# Patient Record
Sex: FEMALE | Race: WHITE | NOT HISPANIC OR LATINO | Employment: FULL TIME | URBAN - NONMETROPOLITAN AREA
[De-identification: names, ages, dates, MRNs, and addresses within clinical notes are randomized per-mention and may not be internally consistent; named-entity substitution may affect disease eponyms.]

---

## 2017-01-05 ENCOUNTER — ROUTINE PRENATAL (OUTPATIENT)
Dept: OBSTETRICS AND GYNECOLOGY | Facility: CLINIC | Age: 27
End: 2017-01-05

## 2017-01-05 VITALS — SYSTOLIC BLOOD PRESSURE: 128 MMHG | WEIGHT: 160 LBS | BODY MASS INDEX: 30.23 KG/M2 | DIASTOLIC BLOOD PRESSURE: 72 MMHG

## 2017-01-05 DIAGNOSIS — Z34.83 PRENATAL CARE, SUBSEQUENT PREGNANCY, THIRD TRIMESTER: ICD-10-CM

## 2017-01-05 DIAGNOSIS — Z3A.35 35 WEEKS GESTATION OF PREGNANCY: ICD-10-CM

## 2017-01-05 DIAGNOSIS — Z36.9 ENCOUNTER FOR ANTENATAL SCREENING: Primary | ICD-10-CM

## 2017-01-05 PROCEDURE — 0502F SUBSEQUENT PRENATAL CARE: CPT | Performed by: OBSTETRICS & GYNECOLOGY

## 2017-01-05 NOTE — MR AVS SNAPSHOT
Sofiya Almaguer   2017 1:15 PM   Routine Prenatal    Dept Phone:  [phone]   Encounter #:  [id number]    Provider:  Pradip Holm MD   Department:  [facility]                Your Full Care Plan              Your Updated Medication List          This list is accurate as of: 17  1:24 PM.  Always use your most recent med list.                clindamycin-benzoyl peroxide 1-5 % gel   Commonly known as:  BENZACLIN       PRENATAL FA PO               We Performed the Following     Group B Strep (Molecular)       You Were Diagnosed With        Codes Comments    Encounter for  screening    -  Primary ICD-10-CM: Z36  ICD-9-CM: V28.9     Prenatal care, subsequent pregnancy, third trimester     ICD-10-CM: Z34.83  ICD-9-CM: V22.1     35 weeks gestation of pregnancy     ICD-10-CM: Z3A.35  ICD-9-CM: V22.2       Instructions     None    Patient Instructions History      Upcoming Appointments     Visit Type Date Time Department    OB FOLLOWUP 2017  1:15 PM MGW OBGYN [facility]    OB FOLLOWUP 2017  3:00 PM W OBGYN [facility]      [facility] Signup     Our records indicate that you have an active [employer] account.    You can view your After Visit Summary by going to TextDigger and logging in with your NetSt[employer] username and password.  If you don't have a NetStreams username and password but a parent or guardian has access to your record, the parent or guardian should login with their own NetStreams username and password and access your record to view the After Visit Summary.    If you have questions, you can email [email or web address] or call [phone] to talk to our NetSt[employer] staff.  Remember, NetStreams is NOT to be used for urgent needs.  For medical emergencies, dial 911.               Other Info from Your Visit           Your Appointments     2017  3:00 PM [facility]   OB FOLLOWUP with Pradip Holm MD   [facility]  GROUP OB GYN (--)    [address] [place] [address] Dr  [facility] [address] [facility] [address] [phone]              Other Notes About Your Plan     Needs repeat us at 32 weeks to assess kidneys        Allergies     No Known Allergies      Reason for Visit     Routine Prenatal Visit           Vital Signs     Blood Pressure Weight Last Menstrual Period Body Mass Index Smoking Status       128/72 160 lb (72.6 kg) 2016 (Exact Date) 30.23 kg/m2 Never Smoker       Problems and Diagnoses Noted     Encounter for  screening    -  Primary    Prenatal care        35 weeks gestation of pregnancy

## 2017-01-05 NOTE — PROGRESS NOTES
Chief Complaint   Patient presents with   • Routine Prenatal Visit       ROS  Headache: No   Visual changes: No   Swelling in legs: No   Nausea: No   Constipation: No   Diarrhea: No   Contractions: No   Leaking fluid: No   Vaginal bleeding: No   Other:      Specific topics discussed at today's visit: none - she had no major complaints,questions or concerns  Tests done today: GBS testing

## 2017-01-12 ENCOUNTER — ROUTINE PRENATAL (OUTPATIENT)
Dept: OBSTETRICS AND GYNECOLOGY | Facility: CLINIC | Age: 27
End: 2017-01-12

## 2017-01-12 VITALS — DIASTOLIC BLOOD PRESSURE: 71 MMHG | BODY MASS INDEX: 29.85 KG/M2 | WEIGHT: 158 LBS | SYSTOLIC BLOOD PRESSURE: 106 MMHG

## 2017-01-12 DIAGNOSIS — Z3A.36 36 WEEKS GESTATION OF PREGNANCY: Primary | ICD-10-CM

## 2017-01-12 DIAGNOSIS — Z34.83 ENCOUNTER FOR SUPERVISION OF NORMAL PREGNANCY IN MULTIGRAVIDA IN THIRD TRIMESTER: ICD-10-CM

## 2017-01-12 PROCEDURE — 0502F SUBSEQUENT PRENATAL CARE: CPT | Performed by: NURSE PRACTITIONER

## 2017-01-12 NOTE — MR AVS SNAPSHOT
Sofiya Concepcionley   1/12/2017 2:00 PM   Routine Prenatal    Dept Phone:  [phone]   Encounter #:  [id number]    Provider:  LEEANN Flores   Department:  [facility] OB GYN                Your Full Care Plan              Your Updated Medication List          This list is accurate as of: 1/12/17  2:24 PM.  Always use your most recent med list.                clindamycin-benzoyl peroxide 1-5 % gel   Commonly known as:  BENZACLIN       PRENATAL FA PO               Instructions     None    Patient Instructions History      Upcoming Appointments     Visit Type Date Time Department    OB FOLLOWUP 1/12/2017  2:00 PM [facility] OBGYN [facility]    OB FOLLOWUP 1/19/2017  2:00 PM [facility] OBGYN [facility]      MyChart Signup     Our records indicate that you have an active [employer] account.    You can view your After Visit Summary by going to BlueYield and logging in with your Enbridge username and password.  If you don't have a Enbridge username and password but a parent or guardian has access to your record, the parent or guardian should login with their own Enbridge username and password and access your record to view the After Visit Summary.    If you have questions, you can email [email or web address] or call [phone] to talk to our Enbridge staff.  Remember, [employer] is NOT to be used for urgent needs.  For medical emergencies, dial 911.               Other Info from Your Visit           Your Appointments     Jan 19, 2017  2:00 PM [facility]   OB FOLLOWUP with Pradip Holm MD   [facility] OB GYN (--)    [address] [place] [address] Dr  [facility] [address] [place] [address] [phone]              Other Notes About Your Plan     Needs repeat us at 32 weeks to assess kidneys        Allergies     No Known Allergies      Vital Signs     Blood Pressure Weight Last Menstrual Period Body Mass Index Smoking Status       106/71 158 lb (71.7 kg)  05/01/2016 (Exact Date) 29.85 kg/m2 Never Smoker

## 2017-01-12 NOTE — PROGRESS NOTES
Chief Complaint   Patient presents with   • Routine Prenatal Visit       ROS  Headache: No   Visual changes: No   Swelling in legs: No   Nausea: No   Constipation: No   Diarrhea: No   Contractions: BH   Leaking fluid: No   Vaginal bleeding: No   Other:      Specific topics discussed at today's visit: labor signs and symptoms  GBS results  Tests done today: none

## 2017-01-19 ENCOUNTER — ROUTINE PRENATAL (OUTPATIENT)
Dept: OBSTETRICS AND GYNECOLOGY | Facility: CLINIC | Age: 27
End: 2017-01-19

## 2017-01-19 VITALS — SYSTOLIC BLOOD PRESSURE: 108 MMHG | DIASTOLIC BLOOD PRESSURE: 72 MMHG | BODY MASS INDEX: 30.42 KG/M2 | WEIGHT: 161 LBS

## 2017-01-19 DIAGNOSIS — Z3A.37 37 WEEKS GESTATION OF PREGNANCY: ICD-10-CM

## 2017-01-19 DIAGNOSIS — Z34.83 ENCOUNTER FOR SUPERVISION OF NORMAL PREGNANCY IN MULTIGRAVIDA IN THIRD TRIMESTER: Primary | ICD-10-CM

## 2017-01-19 PROCEDURE — 0502F SUBSEQUENT PRENATAL CARE: CPT | Performed by: OBSTETRICS & GYNECOLOGY

## 2017-01-19 NOTE — MR AVS SNAPSHOT
Sofiya Almaguer   1/19/2017 2:00 PM   Routine Prenatal    Dept Phone:  [phone]   Encounter #:  [id number]    Provider:  Pradip Holm MD   Department:  [facility] OB GYN                Your Full Care Plan              Your Updated Medication List          This list is accurate as of: 1/19/17  2:02 PM.  Always use your most recent med list.                clindamycin-benzoyl peroxide 1-5 % gel   Commonly known as:  BENZACLIN       PRENATAL FA PO               Instructions     None    Patient Instructions History      Upcoming Appointments     Visit Type Date Time Department    OB FOLLOWUP 1/19/2017  2:00 PM [facility] OBGYN [facility]    OB FOLLOWUP 1/26/2017  1:30 PM [facility] OBGYN [facility]      MyChart Signup     Our records indicate that you have an active [employer] account.    You can view your After Visit Summary by going to Health Informatics and logging in with your [employer] username and password.  If you don't have a [employer] username and password but a parent or guardian has access to your record, the parent or guardian should login with their own [employer] username and password and access your record to view the After Visit Summary.    If you have questions, you can email [email or web address] or call [phone] to talk to our [employer] staff.  Remember, [employer] is NOT to be used for urgent needs.  For medical emergencies, dial 911.               Other Info from Your Visit           Your Appointments     Jan 26, 2017  1:30 PM [facility]   OB FOLLOWUP with Pradip Holm MD   [facility] OB GYN (--)    [address] [place] [address] [address]  [address] [place] [address] [phone]              Other Notes About Your Plan     Needs repeat us at 32 weeks to assess kidneys        Allergies     No Known Allergies      Vital Signs     Blood Pressure Weight Last Menstrual Period Body Mass Index Smoking Status       108/72 161 lb (73 kg)  05/01/2016 (Exact Date) 30.42 kg/m2 Never Smoker

## 2017-01-19 NOTE — PROGRESS NOTES
Chief Complaint   Patient presents with   • Routine Prenatal Visit       ROS  Headache: No   Visual changes: No   Swelling in legs: No   Nausea: No   Constipation: No   Diarrhea: No   Contractions: No   Leaking fluid: No   Vaginal bleeding: No   Other:      Specific topics discussed at today's visit: none - she had no major complaints,questions or concerns  Tests done today: none

## 2017-01-26 ENCOUNTER — ROUTINE PRENATAL (OUTPATIENT)
Dept: OBSTETRICS AND GYNECOLOGY | Facility: CLINIC | Age: 27
End: 2017-01-26

## 2017-01-26 VITALS — SYSTOLIC BLOOD PRESSURE: 112 MMHG | DIASTOLIC BLOOD PRESSURE: 64 MMHG | WEIGHT: 160 LBS | BODY MASS INDEX: 30.23 KG/M2

## 2017-01-26 DIAGNOSIS — Z34.83 ENCOUNTER FOR SUPERVISION OF NORMAL PREGNANCY IN MULTIGRAVIDA IN THIRD TRIMESTER: Primary | ICD-10-CM

## 2017-01-26 DIAGNOSIS — Z3A.38 38 WEEKS GESTATION OF PREGNANCY: ICD-10-CM

## 2017-01-26 PROCEDURE — 0502F SUBSEQUENT PRENATAL CARE: CPT | Performed by: OBSTETRICS & GYNECOLOGY

## 2017-01-29 ENCOUNTER — ANESTHESIA EVENT (OUTPATIENT)
Dept: LABOR AND DELIVERY | Facility: HOSPITAL | Age: 27
End: 2017-01-29

## 2017-01-29 ENCOUNTER — ANESTHESIA (OUTPATIENT)
Dept: LABOR AND DELIVERY | Facility: HOSPITAL | Age: 27
End: 2017-01-29

## 2017-01-29 ENCOUNTER — HOSPITAL ENCOUNTER (INPATIENT)
Facility: HOSPITAL | Age: 27
LOS: 2 days | Discharge: HOME | End: 2017-01-31
Attending: OBSTETRICS & GYNECOLOGY | Admitting: OBSTETRICS & GYNECOLOGY

## 2017-01-29 DIAGNOSIS — Z37.9 NORMAL LABOR: ICD-10-CM

## 2017-01-29 LAB
ABO GROUP BLD: NORMAL
AMPHET+METHAMPHET UR QL: NEGATIVE
BARBITURATES UR QL SCN: NEGATIVE
BENZODIAZ UR QL SCN: NEGATIVE
BLD GP AB SCN SERPL QL: NEGATIVE
CANNABINOIDS SERPL QL: NEGATIVE
COCAINE UR QL: NEGATIVE
DEPRECATED RDW RBC AUTO: 36.7 FL (ref 36.4–46.3)
EOSINOPHIL # BLD MANUAL: 0.13 10*3/MM3 (ref 0–0.7)
EOSINOPHIL NFR BLD MANUAL: 1 % (ref 0–7)
ERYTHROCYTE [DISTWIDTH] IN BLOOD BY AUTOMATED COUNT: 12.6 % (ref 11.5–14.5)
HCT VFR BLD AUTO: 33.1 % (ref 35–45)
HGB BLD-MCNC: 11.4 G/DL (ref 12–15.5)
HYPOCHROMIA BLD QL: ABNORMAL
LYMPHOCYTES # BLD MANUAL: 1.75 10*3/MM3 (ref 0.6–4.2)
LYMPHOCYTES NFR BLD MANUAL: 1 % (ref 0–12)
LYMPHOCYTES NFR BLD MANUAL: 13 % (ref 10–50)
Lab: NORMAL
MCH RBC QN AUTO: 27.7 PG (ref 26.5–34)
MCHC RBC AUTO-ENTMCNC: 34.4 G/DL (ref 31.4–36)
MCV RBC AUTO: 80.3 FL (ref 80–98)
METHADONE UR QL SCN: NEGATIVE
MONOCYTES # BLD AUTO: 0.13 10*3/MM3 (ref 0–0.9)
NEUTROPHILS # BLD AUTO: 11.42 10*3/MM3 (ref 2–8.6)
NEUTROPHILS NFR BLD MANUAL: 85 % (ref 37–80)
OPIATES UR QL: NEGATIVE
OXYCODONE UR QL SCN: NEGATIVE
PLAT MORPH BLD: NORMAL
PLATELET # BLD AUTO: 204 10*3/MM3 (ref 150–450)
PMV BLD AUTO: 10.8 FL (ref 8–12)
RBC # BLD AUTO: 4.12 10*6/MM3 (ref 3.77–5.16)
RH BLD: POSITIVE
WBC MORPH BLD: NORMAL
WBC NRBC COR # BLD: 13.44 10*3/MM3 (ref 3.2–9.8)

## 2017-01-29 PROCEDURE — 86592 SYPHILIS TEST NON-TREP QUAL: CPT | Performed by: OBSTETRICS & GYNECOLOGY

## 2017-01-29 PROCEDURE — 85027 COMPLETE CBC AUTOMATED: CPT | Performed by: OBSTETRICS & GYNECOLOGY

## 2017-01-29 PROCEDURE — 80307 DRUG TEST PRSMV CHEM ANLYZR: CPT | Performed by: OBSTETRICS & GYNECOLOGY

## 2017-01-29 PROCEDURE — 10907ZC DRAINAGE OF AMNIOTIC FLUID, THERAPEUTIC FROM PRODUCTS OF CONCEPTION, VIA NATURAL OR ARTIFICIAL OPENING: ICD-10-PCS | Performed by: OBSTETRICS & GYNECOLOGY

## 2017-01-29 PROCEDURE — 86850 RBC ANTIBODY SCREEN: CPT

## 2017-01-29 PROCEDURE — 85007 BL SMEAR W/DIFF WBC COUNT: CPT | Performed by: OBSTETRICS & GYNECOLOGY

## 2017-01-29 PROCEDURE — 86901 BLOOD TYPING SEROLOGIC RH(D): CPT

## 2017-01-29 PROCEDURE — 86900 BLOOD TYPING SEROLOGIC ABO: CPT

## 2017-01-29 RX ORDER — BUPIVACAINE HYDROCHLORIDE 2.5 MG/ML
INJECTION, SOLUTION EPIDURAL; INFILTRATION; INTRACAUDAL AS NEEDED
Status: DISCONTINUED | OUTPATIENT
Start: 2017-01-29 | End: 2017-01-30 | Stop reason: SURG

## 2017-01-29 RX ORDER — SODIUM CHLORIDE 0.9 % (FLUSH) 0.9 %
1-10 SYRINGE (ML) INJECTION AS NEEDED
Status: DISCONTINUED | OUTPATIENT
Start: 2017-01-29 | End: 2017-01-30

## 2017-01-29 RX ORDER — SODIUM CHLORIDE, SODIUM LACTATE, POTASSIUM CHLORIDE, CALCIUM CHLORIDE 600; 310; 30; 20 MG/100ML; MG/100ML; MG/100ML; MG/100ML
INJECTION, SOLUTION INTRAVENOUS
Status: DISCONTINUED
Start: 2017-01-29 | End: 2017-01-30 | Stop reason: WASHOUT

## 2017-01-29 RX ORDER — OXYTOCIN/RINGER'S LACTATE 20/1000 ML
PLASTIC BAG, INJECTION (ML) INTRAVENOUS
Status: DISPENSED
Start: 2017-01-29 | End: 2017-01-30

## 2017-01-29 RX ORDER — SODIUM CHLORIDE, SODIUM LACTATE, POTASSIUM CHLORIDE, CALCIUM CHLORIDE 600; 310; 30; 20 MG/100ML; MG/100ML; MG/100ML; MG/100ML
INJECTION, SOLUTION INTRAVENOUS
Status: DISPENSED
Start: 2017-01-29 | End: 2017-01-30

## 2017-01-29 RX ORDER — PRENATAL VIT/IRON FUM/FOLIC AC 27MG-0.8MG
1 TABLET ORAL DAILY
Status: DISCONTINUED | OUTPATIENT
Start: 2017-01-30 | End: 2017-01-31 | Stop reason: HOSPADM

## 2017-01-29 RX ORDER — SODIUM CHLORIDE, SODIUM LACTATE, POTASSIUM CHLORIDE, CALCIUM CHLORIDE 600; 310; 30; 20 MG/100ML; MG/100ML; MG/100ML; MG/100ML
125 INJECTION, SOLUTION INTRAVENOUS CONTINUOUS
Status: DISCONTINUED | OUTPATIENT
Start: 2017-01-29 | End: 2017-01-30

## 2017-01-29 RX ADMIN — SODIUM CHLORIDE, POTASSIUM CHLORIDE, SODIUM LACTATE AND CALCIUM CHLORIDE 125 ML/HR: 600; 310; 30; 20 INJECTION, SOLUTION INTRAVENOUS at 22:30

## 2017-01-29 RX ADMIN — BUPIVACAINE HYDROCHLORIDE 2 ML: 2.5 INJECTION, SOLUTION EPIDURAL; INFILTRATION; INTRACAUDAL; PERINEURAL at 22:36

## 2017-01-29 RX ADMIN — BUPIVACAINE HYDROCHLORIDE 3 ML: 2.5 INJECTION, SOLUTION EPIDURAL; INFILTRATION; INTRACAUDAL; PERINEURAL at 22:50

## 2017-01-30 PROCEDURE — 51703 INSERT BLADDER CATH COMPLEX: CPT

## 2017-01-30 PROCEDURE — C1755 CATHETER, INTRASPINAL: HCPCS | Performed by: NURSE PRACTITIONER

## 2017-01-30 PROCEDURE — 0KQM0ZZ REPAIR PERINEUM MUSCLE, OPEN APPROACH: ICD-10-PCS | Performed by: OBSTETRICS & GYNECOLOGY

## 2017-01-30 PROCEDURE — 59400 OBSTETRICAL CARE: CPT | Performed by: OBSTETRICS & GYNECOLOGY

## 2017-01-30 RX ORDER — MISOPROSTOL 200 UG/1
800 TABLET ORAL AS NEEDED
Status: DISCONTINUED | OUTPATIENT
Start: 2017-01-30 | End: 2017-01-30 | Stop reason: HOSPADM

## 2017-01-30 RX ORDER — LANOLIN 100 %
OINTMENT (GRAM) TOPICAL
Status: COMPLETED
Start: 2017-01-30 | End: 2017-01-30

## 2017-01-30 RX ORDER — SODIUM CHLORIDE 0.9 % (FLUSH) 0.9 %
1-10 SYRINGE (ML) INJECTION AS NEEDED
Status: DISCONTINUED | OUTPATIENT
Start: 2017-01-30 | End: 2017-01-31 | Stop reason: HOSPADM

## 2017-01-30 RX ORDER — ZOLPIDEM TARTRATE 5 MG/1
5 TABLET ORAL NIGHTLY PRN
Status: DISCONTINUED | OUTPATIENT
Start: 2017-01-30 | End: 2017-01-31 | Stop reason: HOSPADM

## 2017-01-30 RX ORDER — IBUPROFEN 600 MG/1
600 TABLET ORAL EVERY 8 HOURS PRN
Status: DISCONTINUED | OUTPATIENT
Start: 2017-01-30 | End: 2017-01-31 | Stop reason: HOSPADM

## 2017-01-30 RX ORDER — LIDOCAINE HYDROCHLORIDE 10 MG/ML
INJECTION, SOLUTION INFILTRATION; PERINEURAL
Status: DISPENSED
Start: 2017-01-30 | End: 2017-01-30

## 2017-01-30 RX ORDER — DOCUSATE SODIUM 100 MG/1
100 CAPSULE, LIQUID FILLED ORAL 2 TIMES DAILY
Status: DISCONTINUED | OUTPATIENT
Start: 2017-01-30 | End: 2017-01-31 | Stop reason: HOSPADM

## 2017-01-30 RX ORDER — CARBOPROST TROMETHAMINE 250 UG/ML
250 INJECTION, SOLUTION INTRAMUSCULAR AS NEEDED
Status: DISCONTINUED | OUTPATIENT
Start: 2017-01-30 | End: 2017-01-30 | Stop reason: HOSPADM

## 2017-01-30 RX ORDER — METHYLERGONOVINE MALEATE 0.2 MG/ML
200 INJECTION INTRAVENOUS ONCE AS NEEDED
Status: DISCONTINUED | OUTPATIENT
Start: 2017-01-30 | End: 2017-01-30 | Stop reason: HOSPADM

## 2017-01-30 RX ORDER — BISACODYL 10 MG
10 SUPPOSITORY, RECTAL RECTAL DAILY PRN
Status: DISCONTINUED | OUTPATIENT
Start: 2017-01-31 | End: 2017-01-31 | Stop reason: HOSPADM

## 2017-01-30 RX ORDER — IBUPROFEN 600 MG/1
TABLET ORAL
Status: COMPLETED
Start: 2017-01-30 | End: 2017-01-30

## 2017-01-30 RX ORDER — OXYCODONE HYDROCHLORIDE AND ACETAMINOPHEN 5; 325 MG/1; MG/1
1 TABLET ORAL EVERY 4 HOURS PRN
Status: DISCONTINUED | OUTPATIENT
Start: 2017-01-30 | End: 2017-01-31 | Stop reason: HOSPADM

## 2017-01-30 RX ORDER — OXYTOCIN/RINGER'S LACTATE 20/1000 ML
2 PLASTIC BAG, INJECTION (ML) INTRAVENOUS
Status: DISCONTINUED | OUTPATIENT
Start: 2017-01-30 | End: 2017-01-30 | Stop reason: HOSPADM

## 2017-01-30 RX ADMIN — Medication 2 MILLI-UNITS/MIN: at 01:30

## 2017-01-30 RX ADMIN — OXYCODONE HYDROCHLORIDE AND ACETAMINOPHEN 1 TABLET: 5; 325 TABLET ORAL at 09:05

## 2017-01-30 RX ADMIN — DOCUSATE SODIUM 100 MG: 100 CAPSULE, LIQUID FILLED ORAL at 10:15

## 2017-01-30 RX ADMIN — BENZOCAINE AND MENTHOL: 20; .5 SPRAY TOPICAL at 08:41

## 2017-01-30 RX ADMIN — IBUPROFEN 600 MG: 600 TABLET ORAL at 03:48

## 2017-01-30 RX ADMIN — IBUPROFEN 600 MG: 600 TABLET ORAL at 11:25

## 2017-01-30 RX ADMIN — OXYCODONE HYDROCHLORIDE AND ACETAMINOPHEN 1 TABLET: 5; 325 TABLET ORAL at 13:44

## 2017-01-30 RX ADMIN — OXYCODONE HYDROCHLORIDE AND ACETAMINOPHEN 1 TABLET: 5; 325 TABLET ORAL at 19:01

## 2017-01-30 RX ADMIN — PRENATAL VIT W/ FE FUMARATE-FA TAB 27-0.8 MG 1 TABLET: 27-0.8 TAB at 10:15

## 2017-01-30 RX ADMIN — DOCUSATE SODIUM 100 MG: 100 CAPSULE, LIQUID FILLED ORAL at 19:01

## 2017-01-30 RX ADMIN — Medication: at 13:30

## 2017-01-30 NOTE — ANESTHESIA PREPROCEDURE EVALUATION
Anesthesia Evaluation     Patient summary reviewed and Nursing notes reviewed    No history of anesthetic complications   Airway   Mallampati: II  Neck ROM: full  no difficulty expected  Dental      Pulmonary - negative pulmonary ROS and normal exam   (-) shortness of breath  Cardiovascular - negative cardio ROS and normal exam  Exercise tolerance: good (4-7 METS)  (-) CAD, angina, orthopnea, RIOS    Neuro/Psych- negative ROS  (-) CVA  GI/Hepatic/Renal/Endo - negative ROS     Musculoskeletal (-) negative ROS    Abdominal  - normal exam   Substance History - negative use     OB/GYN negative ob/gyn ROS         Other - negative ROS                            Anesthesia Plan    ASA 2     epidural     Anesthetic plan and risks discussed with patient.

## 2017-01-30 NOTE — ANESTHESIA PROCEDURE NOTES
Labor Epidural    Patient location during procedure: OB  Performed By  CRNA: OLIMPIA FERREIRA  Preanesthetic Checklist  Completed: patient identified, pre-op evaluation, timeout performed, IV checked, risks and benefits discussed and monitors and equipment checked  Epidural Block Prep:  Pt Position:sitting  Sterile Tech:cap, gloves, mask and sterile barrier  Monitoring:blood pressure monitoring, continuous pulse oximetry and EKG  Epidural Block Procedure:  Approach:midline  Guidance:landmark technique  Needle Type:Tuohy  Needle Gauge:17 G  Loss of Resistance: 5cm  Cath Depth at skin:10 cm  Paresthesia: none  Aspiration:negative  Test Dose:negative  Post Assessment:  Dressing:occlusive dressing applied  Pt Tolerance:patient tolerated the procedure well with no apparent complications  Complications:no

## 2017-01-30 NOTE — ANESTHESIA POSTPROCEDURE EVALUATION
Patient: Sofiya Almaguer    Procedure Summary     Date Anesthesia Start Anesthesia Stop Room / Location    01/29/17 2218 0154 (01/30/17)        Procedure Diagnosis Scheduled Providers Provider    LABOR ANALGESIA No diagnosis on file.  Louie Davis CRNA          Anesthesia Type: epidural  Last vitals  BP      Temp      Pulse     Resp      SpO2        Post Anesthesia Care and Evaluation    Patient location during evaluation: bedside  Patient participation: complete - patient participated  Level of consciousness: awake and alert  Pain management: adequate  Airway patency: patent  Anesthetic complications: No anesthetic complications    Cardiovascular status: acceptable  Respiratory status: acceptable  Hydration status: acceptable

## 2017-01-31 VITALS
BODY MASS INDEX: 29.44 KG/M2 | OXYGEN SATURATION: 100 % | TEMPERATURE: 97.9 F | SYSTOLIC BLOOD PRESSURE: 119 MMHG | RESPIRATION RATE: 18 BRPM | HEIGHT: 62 IN | WEIGHT: 160 LBS | DIASTOLIC BLOOD PRESSURE: 78 MMHG | HEART RATE: 88 BPM

## 2017-01-31 LAB
BASOPHILS # BLD AUTO: 0.03 10*3/MM3 (ref 0–0.2)
BASOPHILS NFR BLD AUTO: 0.4 % (ref 0–2)
DEPRECATED RDW RBC AUTO: 37.7 FL (ref 36.4–46.3)
EOSINOPHIL # BLD AUTO: 0.14 10*3/MM3 (ref 0–0.7)
EOSINOPHIL NFR BLD AUTO: 1.7 % (ref 0–7)
ERYTHROCYTE [DISTWIDTH] IN BLOOD BY AUTOMATED COUNT: 12.8 % (ref 11.5–14.5)
HCT VFR BLD AUTO: 30.6 % (ref 35–45)
HGB BLD-MCNC: 10.3 G/DL (ref 12–15.5)
IMM GRANULOCYTES # BLD: 0.05 10*3/MM3 (ref 0–0.02)
IMM GRANULOCYTES NFR BLD: 0.6 % (ref 0–0.5)
LYMPHOCYTES # BLD AUTO: 2.14 10*3/MM3 (ref 0.6–4.2)
LYMPHOCYTES NFR BLD AUTO: 26.5 % (ref 10–50)
MCH RBC QN AUTO: 27.5 PG (ref 26.5–34)
MCHC RBC AUTO-ENTMCNC: 33.7 G/DL (ref 31.4–36)
MCV RBC AUTO: 81.6 FL (ref 80–98)
MONOCYTES # BLD AUTO: 0.54 10*3/MM3 (ref 0–0.9)
MONOCYTES NFR BLD AUTO: 6.7 % (ref 0–12)
NEUTROPHILS # BLD AUTO: 5.18 10*3/MM3 (ref 2–8.6)
NEUTROPHILS NFR BLD AUTO: 64.1 % (ref 37–80)
PLATELET # BLD AUTO: 211 10*3/MM3 (ref 150–450)
PMV BLD AUTO: 10.8 FL (ref 8–12)
RBC # BLD AUTO: 3.75 10*6/MM3 (ref 3.77–5.16)
RPR SER QL: NORMAL
WBC NRBC COR # BLD: 8.08 10*3/MM3 (ref 3.2–9.8)

## 2017-01-31 PROCEDURE — 85025 COMPLETE CBC W/AUTO DIFF WBC: CPT | Performed by: OBSTETRICS & GYNECOLOGY

## 2017-01-31 RX ORDER — OXYCODONE HYDROCHLORIDE AND ACETAMINOPHEN 5; 325 MG/1; MG/1
1 TABLET ORAL EVERY 4 HOURS PRN
Qty: 30 TABLET | Refills: 0 | Status: SHIPPED | OUTPATIENT
Start: 2017-01-31 | End: 2017-02-08

## 2017-01-31 RX ORDER — IBUPROFEN 600 MG/1
600 TABLET ORAL EVERY 8 HOURS PRN
Qty: 30 TABLET | Refills: 1 | Status: SHIPPED | OUTPATIENT
Start: 2017-01-31 | End: 2017-02-20 | Stop reason: SDUPTHER

## 2017-01-31 RX ADMIN — OXYCODONE HYDROCHLORIDE AND ACETAMINOPHEN 1 TABLET: 5; 325 TABLET ORAL at 08:51

## 2017-01-31 RX ADMIN — OXYCODONE HYDROCHLORIDE AND ACETAMINOPHEN 1 TABLET: 5; 325 TABLET ORAL at 01:22

## 2017-01-31 RX ADMIN — DOCUSATE SODIUM 100 MG: 100 CAPSULE, LIQUID FILLED ORAL at 08:51

## 2017-01-31 RX ADMIN — BENZOCAINE AND MENTHOL: 20; .5 SPRAY TOPICAL at 10:02

## 2017-01-31 RX ADMIN — HYDROCORTISONE 2.5% 1 APPLICATION: 25 CREAM TOPICAL at 10:12

## 2017-01-31 RX ADMIN — IBUPROFEN 600 MG: 600 TABLET ORAL at 08:51

## 2017-01-31 RX ADMIN — PRENATAL VIT W/ FE FUMARATE-FA TAB 27-0.8 MG 1 TABLET: 27-0.8 TAB at 08:51

## 2017-01-31 NOTE — ADDENDUM NOTE
Addendum  created 01/31/17 1234 by Louie Davis, CRNA    Anesthesia Intra LDAs edited, LDA properties accepted, Order sets accessed

## 2017-02-08 ENCOUNTER — OFFICE VISIT (OUTPATIENT)
Dept: OBSTETRICS AND GYNECOLOGY | Facility: CLINIC | Age: 27
End: 2017-02-08

## 2017-02-08 VITALS — WEIGHT: 144 LBS | BODY MASS INDEX: 26.77 KG/M2

## 2017-02-08 PROBLEM — Z34.83 ENCOUNTER FOR SUPERVISION OF NORMAL PREGNANCY IN MULTIGRAVIDA IN THIRD TRIMESTER: Status: RESOLVED | Noted: 2017-01-12 | Resolved: 2017-02-08

## 2017-02-08 PROBLEM — Z37.9 NORMAL LABOR: Status: RESOLVED | Noted: 2017-01-29 | Resolved: 2017-02-08

## 2017-02-08 PROCEDURE — 0503F POSTPARTUM CARE VISIT: CPT | Performed by: OBSTETRICS & GYNECOLOGY

## 2017-02-08 NOTE — PROGRESS NOTES
Subjective   No chief complaint on file.    Sofiya Almaguer is a 26 y.o. year old  presenting for a postpartum visit.  She had a vaginal delivery With forceps.   Prenatal course was been benign.  The patient complains of pain at her epidural site and if swelling in both legs.  She also notes headaches which are not alleviated by Motrin    Since delivery she has not been sexually active.  She does not have concerns about post-partum blues/depression.   She is breast feeding    The following portions of the patient's history were reviewed and updated as appropriate:current medications, allergies and past surgical history    Smoking status: Never Smoker                                                              Smokeless status: Never Used                        Review of Systems      Objective   Visit Vitals   • Wt 144 lb (65.3 kg)   • LMP 2016 (Exact Date)   • Breastfeeding Yes   • BMI 26.77 kg/m2       General:  well developed; well nourished  no acute distress   Abdomen: soft, non-tender; no masses  no umbilical or inginual hernias are present  no hepato-splenomegaly  The back is nontender at the area of the epidural site   Pelvis: Not performed.   Her blood pressure was 108/66       Assessment   1. Normal 1 week postpartum exam     Plan   1. She was reassured that the swelling should go away within the next 2-3 weeks.  There is probably some bruising beneath the skin at the epidural site and this should probably get better as well.  If her headaches don't get better she should consider seeing her primary care doctor or neurology         This note was electronically signed.    Pradip Holm MD  2017

## 2017-02-20 ENCOUNTER — OFFICE VISIT (OUTPATIENT)
Dept: FAMILY MEDICINE CLINIC | Facility: CLINIC | Age: 27
End: 2017-02-20

## 2017-02-20 VITALS
BODY MASS INDEX: 26.41 KG/M2 | SYSTOLIC BLOOD PRESSURE: 96 MMHG | OXYGEN SATURATION: 99 % | HEART RATE: 86 BPM | HEIGHT: 61 IN | DIASTOLIC BLOOD PRESSURE: 55 MMHG | WEIGHT: 139.9 LBS | TEMPERATURE: 97.3 F

## 2017-02-20 DIAGNOSIS — M54.50 ACUTE BILATERAL LOW BACK PAIN WITHOUT SCIATICA: ICD-10-CM

## 2017-02-20 DIAGNOSIS — H72.91 RUPTURED TYMPANIC MEMBRANE, RIGHT: Primary | ICD-10-CM

## 2017-02-20 PROCEDURE — 99213 OFFICE O/P EST LOW 20 MIN: CPT | Performed by: NURSE PRACTITIONER

## 2017-02-20 RX ORDER — FLUTICASONE PROPIONATE 50 MCG
2 SPRAY, SUSPENSION (ML) NASAL DAILY
Qty: 16 G | Refills: 5 | Status: SHIPPED | OUTPATIENT
Start: 2017-02-20 | End: 2017-03-15

## 2017-02-20 RX ORDER — CLINDAMYCIN PHOSPHATE 10 MG/G
GEL TOPICAL 2 TIMES WEEKLY
COMMUNITY
End: 2017-03-15

## 2017-02-20 RX ORDER — AMOXICILLIN AND CLAVULANATE POTASSIUM 875; 125 MG/1; MG/1
1 TABLET, FILM COATED ORAL 2 TIMES DAILY
Qty: 20 TABLET | Refills: 0 | Status: SHIPPED | OUTPATIENT
Start: 2017-02-20 | End: 2017-03-02

## 2017-02-20 RX ORDER — IBUPROFEN 800 MG/1
800 TABLET ORAL EVERY 6 HOURS PRN
COMMUNITY
End: 2017-03-15

## 2017-02-21 NOTE — PROGRESS NOTES
Subjective   Sofiya Almaguer is a 26 y.o. female. She presents today for difficulty hearting out of the right ear.  Reports symptoms for quite some time. She also has complaints of chronic lower back pain.  Recently had a baby. Would like a referral for PT to help treat this.    Ear Fullness    There is pain in the right ear. This is a chronic problem. The current episode started 1 to 4 weeks ago. The problem occurs constantly. The problem has been unchanged. There has been no fever. The patient is experiencing no pain. Associated symptoms include headaches, hearing loss and rhinorrhea. Pertinent negatives include no abdominal pain, coughing, diarrhea, ear discharge, neck pain, rash, sore throat or vomiting.   Back Pain   This is a chronic problem. The current episode started 1 to 4 weeks ago. The problem occurs constantly. The problem has been waxing and waning since onset. The pain is present in the lumbar spine. The quality of the pain is described as aching. The pain is the same all the time. Associated symptoms include headaches. Pertinent negatives include no abdominal pain, bladder incontinence, chest pain, dysuria, fever, leg pain, numbness, paresis, paresthesias, pelvic pain, perianal numbness, tingling, weakness or weight loss. Risk factors include pregnancy. She has tried NSAIDs for the symptoms. The treatment provided mild relief.        The following portions of the patient's history were reviewed and updated as appropriate: allergies, current medications, past family history, past medical history, past social history, past surgical history and problem list.    Review of Systems   Constitutional: Negative.  Negative for fever and weight loss.   HENT: Positive for hearing loss, postnasal drip and rhinorrhea. Negative for ear discharge, ear pain, facial swelling, mouth sores, nosebleeds, sinus pressure, sneezing and sore throat.    Respiratory: Negative.  Negative for cough and shortness of breath.     Cardiovascular: Negative.  Negative for chest pain and palpitations.   Gastrointestinal: Negative for abdominal pain, diarrhea and vomiting.   Genitourinary: Negative for bladder incontinence, dysuria and pelvic pain.   Musculoskeletal: Positive for arthralgias and back pain. Negative for neck pain.   Skin: Negative.  Negative for rash.   Neurological: Positive for headaches. Negative for tingling, weakness, numbness and paresthesias.       Objective   Physical Exam   Constitutional: She is oriented to person, place, and time. Vital signs are normal. She appears well-developed and well-nourished. No distress.   HENT:   Head: Normocephalic.   Right Ear: External ear normal. Tympanic membrane is injected and perforated.   Left Ear: External ear normal.   Nose: Nose normal.   Mouth/Throat: Oropharynx is clear and moist.   Eyes: EOM are normal. Pupils are equal, round, and reactive to light.   Neck: Normal range of motion. Neck supple. No JVD present. No thyromegaly present.   Cardiovascular: Normal rate and regular rhythm.  Exam reveals no gallop and no friction rub.    No murmur heard.  Pulmonary/Chest: Effort normal and breath sounds normal. No respiratory distress. She has no wheezes. She has no rales.   Musculoskeletal: Normal range of motion.        Lumbar back: She exhibits pain.   Neurological: She is alert and oriented to person, place, and time.   Skin: Skin is warm and dry. No rash noted. She is not diaphoretic. No erythema. No pallor.   Psychiatric: She has a normal mood and affect. Her behavior is normal. Judgment and thought content normal.   Nursing note and vitals reviewed.      Assessment/Plan   Sofiya was seen today for ear fullness, headache, neck pain and back pain.    Diagnoses and all orders for this visit:    Ruptured tympanic membrane, right  -     amoxicillin-clavulanate (AUGMENTIN) 875-125 MG per tablet; Take 1 tablet by mouth 2 (Two) Times a Day for 10 days.  -     fluticasone (FLONASE) 50  MCG/ACT nasal spray; 2 sprays into each nostril Daily.    Acute bilateral low back pain without sciatica  -     Ambulatory Referral to ENT (Otolaryngology)  -     Ambulatory Referral to Physical Therapy Evaluate and treat    Finish all antibiotics.  Referral to ENT for eval and further tx if needed.  Referral to PT for eval and tx of lower back pain.  Continue all other current medications.  Follow up as scheduled for routine follow up.  Follow up sooner for problems/concerns.  Patient verbalized understanding and agreement with plan of care.

## 2017-03-08 ENCOUNTER — APPOINTMENT (OUTPATIENT)
Dept: PHYSICAL THERAPY | Facility: HOSPITAL | Age: 27
End: 2017-03-08

## 2017-03-15 ENCOUNTER — PROCEDURE VISIT (OUTPATIENT)
Dept: OBSTETRICS AND GYNECOLOGY | Facility: CLINIC | Age: 27
End: 2017-03-15

## 2017-03-15 DIAGNOSIS — Z30.017 NEXPLANON INSERTION: ICD-10-CM

## 2017-03-15 PROCEDURE — 0503F POSTPARTUM CARE VISIT: CPT | Performed by: OBSTETRICS & GYNECOLOGY

## 2017-03-15 PROCEDURE — 11981 INSERTION DRUG DLVR IMPLANT: CPT | Performed by: OBSTETRICS & GYNECOLOGY

## 2017-03-15 NOTE — PROGRESS NOTES
Subjective   Chief Complaint   Patient presents with   • Postpartum Care     6 wk pp/poss nexplanon     Sofiya Almaguer is a 27 y.o. year old  presenting for a postpartum visit.  She had a vaginal delivery.   Prenatal course was been benign.    Since delivery she has not been sexually active.  She does not have concerns about post-partum blues/depression.   She is breast feeding    The following portions of the patient's history were reviewed and updated as appropriate:current medications, allergies and past surgical history    Smoking status: Never Smoker                                                              Smokeless status: Never Used                        Review of Systems      Objective   Visit Vitals   • LMP 2017 (Approximate)   • Breastfeeding Yes       General:  well developed; well nourished  no acute distress   Abdomen: soft, non-tender; no masses  no umbilical or inginual hernias are present  no hepato-splenomegaly   Pelvis: Clinical staff was present for exam  Uterus:  normal size, shape and consistency.     Nexplanon Insertion    Risks and benefits discussed? yes  All questions answered? yes  Consents given by the patient  Written consent obtained? yes    Local anesthesia used:  yes - 3 cc's of  Meds; anesthesia local: 1% lidocaine with epinephrine  NDC number: 1908-0901-89    Procedure documentation:    The upper left arm (non-dominant) was marked at the intended site of insertion. The skin was cleansed with an antiseptic solution.  Local anesthesia was injected.  The Nexplanon was placed subdermally without difficulty.  The devise was able to be palpated in the arm.  Steri-strips were then placed across the site of insertion and the arm was wrapped.    She tolerated the procedure well.  There were no complications.  EBL was minimal.    Post procedure instructions: Remove the wrapping in 6-24 hours and the steri-strips in 2-5 days.    Follow up needed: PRN    This note was  electronically signed.         Assessment   1. Normal 6 week postpartum exam  2. Nexplanon insertion     Plan   1. The patient is due for her next Pap smear in October.  She is to follow up then.  Follow up sooner if she has any problems.         This note was electronically signed.    Pradip Holm MD  March 15, 2017

## 2017-03-22 ENCOUNTER — HOSPITAL ENCOUNTER (OUTPATIENT)
Dept: PHYSICAL THERAPY | Facility: HOSPITAL | Age: 27
Setting detail: THERAPIES SERIES
Discharge: HOME | End: 2017-03-22

## 2017-03-22 DIAGNOSIS — M54.50 ACUTE BILATERAL LOW BACK PAIN WITHOUT SCIATICA: Primary | ICD-10-CM

## 2017-03-22 PROCEDURE — 97162 PT EVAL MOD COMPLEX 30 MIN: CPT | Performed by: PHYSICAL THERAPIST

## 2017-03-22 NOTE — PROGRESS NOTES
Outpatient Physical Therapy Ortho Initial Evaluation  [facility]  Palmira Brooke, PT, DPT, CSCS       Patient Name: Sofiya Almaguer  : 1990  MRN: [id number]  Today's Date: 3/22/2017      Visit Date: 2017     Pt reports 0/10 pain.  Reports N/A% of improvement.  Attended  visits.  Insurance available: Pending approval from secondary insurance, primary insurance based on medical necessity.  Next MD appt: Unknown  Recertification: 2017.    Patient Active Problem List   Diagnosis   (none) - all problems resolved or deleted        Past Medical History:   Diagnosis Date   • Abnormal liver function    • Abnormal weight gain    • Acne    • Cervical intraepithelial neoplasia grade 2     h/o   • Contraception    • Hyperlipidemia    • Vitamin D deficiency         Past Surgical History:   Procedure Laterality Date   • ADENOIDECTOMY     • CERVICAL BIOPSY  W/ LOOP ELECTRODE EXCISION     • INCISION AND DRAINAGE ABSCESS  2012    simple I&D   • LEEP  2007    LEEP cone biopsy. moderate cervical dysplasia   • OTHER SURGICAL HISTORY  2011    remove impacted cerumen   • TONSILLECTOMY         Visit Dx:     ICD-10-CM ICD-9-CM   1. Acute bilateral low back pain without sciatica M54.5 724.2     338.19     Number of days off work: None since returning from maternity leave    Patient is Single    Patient has 5 children, ages 11, 10, 8, 5, and 8 weeks.    Medications:  Prenatal Vitamin  Motrin 800mg  Fenugreel (vitamin)    Allergies: See EMR          Patient History       17 0900          History    Chief Complaint Pain  -AJ      Type of Pain Back pain  -AJ      Date Current Problem(s) Began 17  -AJ      Brief Description of Current Complaint Patient reports onset of LBP after her epidural wore off. Slowly improving but hurts more with standing or sitting too long. pain mainly right in the center. Feels like a sharp pain like sticking the needle in and then  "sharp pain. 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 No  -AJ      What clinical tests have you had for this problem? --   None  -AJ      History of Previous Related Injuries None  -AJ      Are you or can you be pregnant No  -AJ      Pain     Pain Location Back  -AJ      Pain at Present 0  -AJ      Pain at Best 0  -AJ      Pain at Worst 5  -AJ      Pain Frequency Intermittent  -AJ      Pain Description Throbbing;Sharp  -AJ      What Performance Factors Make the Current Problem(s) BETTER? Flexion  -AJ      Tolerance Time- Standing 1.5-2 hours  -AJ      Tolerance Time- Sitting 1 hour  -AJ      Tolerance Time- Walking Unlimited  -AJ      Tolerance Time- Lying Unlimited  -AJ      Is your sleep disturbed? No  -AJ      Is medication used to assist with sleep? No  -AJ      Difficulties at work? Standing long periods of time.  -AJ      Difficulties with ADL's? None.  -AJ        User Key  (r) = Recorded By, (t) = Taken By, (c) = Cosigned By    Initials Name Provider Type    AJ Palmira Brooke, PT Physical Therapist                PT Ortho       03/22/17 0900    Subjective Comments    Subjective Comments Patient wishes to get rid of the pain and \"be like it was\".  -AJ    Subjective Pain    Able to rate subjective pain? yes  -AJ    Pre-Treatment Pain Level 0  -AJ    Post-Treatment Pain Level 0  -AJ    Posture/Observations    Posture- WNL Posture is WNL  -AJ    Posture/Observations Comments Postural and spinal curves are all WNL, but the patient has extreamly poor sitting and standing postureal " awareness with slumped posture for both.  -AJ    Sensation    Sensation WNL? WNL  -AJ    Light Touch No apparent deficits  -AJ    Sharp/Dull No apparent deficits  -AJ    Additional Comments Patient denies and numbness and tingling  -AJ    DTR- Lower Quarter Clearing    Patellar tendon (L2-4) Bilateral:;2- Normal response  -AJ    Achilles tendon (S1-2) Bilateral:;2- Normal response  -AJ    Special Tests/Palpation    Special Tests/Palpation --   Mod TTP L3-L5, midline vertebral  -AJ    Lumbar/SI Special Tests    Standing Flexion Test (SI Dysfunction) Negative  -AJ    Stork Test (SI Dysfunction) Bilateral:;Negative  -AJ    Trendelenburg Test (Gluteus Medius Weakness) Bilateral:;Negative  -AJ    Slump Test (Neural Tension) Bilateral:;Negative  -AJ    Amber Newton Test (HNP) Negative  -AJ    SLR (Neural Tension) Bilateral:;Negative  -AJ    SI Compression Test (SI Dysfunction) Bilateral:;Negative  -AJ    SI Distraction Test (SI Dysfunction) Bilateral:;Negative  -AJ    FELICIA (hip vs. SI Dysfunction) Bilateral:;Negative  -AJ    FAIR Test (Piriformis Syndrome) Bilateral:;Negative  -AJ    Sacral Spring Test (SI Dysfunction) Negative  -AJ    Bren's Signs    Superficial and non-anatomical tenderness Negative  -AJ    Simulation test Negative  -AJ    Distraction straight leg raise test (sitting vs supine) Negative  -AJ    Regional disturbances Negative  -AJ    Overreaction to examination Negative  -AJ    Leg Length Test    Apparent Equal   Pelvis appears to be level.  -AJ    Trunk    Flexion AROM --   80°  -AJ    Extension AROM --   28°  -AJ    Left Lateral Flexion AROM WNL (0-35 degrees)  -AJ    Right Lateral Flexion AROM WNL (0-35 degrees)  -AJ    Left Rotation AROM WNL (0-45 degrees)  -AJ    Right Rotation AROM WNL (0-45 degrees)  -AJ    MMT (Manual Muscle Testing)    General MMT Assessment Detail B LE 5/5 except hip flexion B 4+/5  -AJ    Lower Extremity Flexibility    Hamstrings Right:;Mildly limited;Left:;Moderately limited   -    Transfers    Transfer, Comment I with all transfers without difficulty.  -    Gait Assessment/Treatment    Gait, Comment FWB, non-antlagic gait, no acute distress.  -      User Key  (r) = Recorded By, (t) = Taken By, (c) = Cosigned By    Initials Name Provider Type    MOHAN Brooke, PT Physical Therapist                    Therapy Education       03/22/17 1000          Therapy Education    Given Posture/body mechanics   Plan of Care  -      Program New  -      How Provided Verbal  -      Provided to Patient  -      Level of Understanding Verbalized  -        User Key  (r) = Recorded By, (t) = Taken By, (c) = Cosigned By    Initials Name Provider Type    MOHAN Brooke PT Physical Therapist                PT OP Goals       03/22/17 1000       PT Short Term Goals    STG Date to Achieve 04/12/17  -     STG 1 I with HEP and have additions/changes by next recertification.  -     STG 2 AROM lumbar flexion 95°, fingers to the toes.  -     STG 3 AROM lumbar extension 40° no increase in pain.  -     STG 4 B hip flexion 5/5.  -     STG 5 More aware of posture and posture correction technique.  -     Long Term Goals    LTG Date to Achieve 04/19/17  -     LTG 1 No TTP in lumbar spine.  -     LTG 2 AROM of lumbar spine all WNL no pain.  -     LTG 3 B LE 5/5 no pain.  -     LTG 4 Able to show proper lifingtwchnique at the work station 20#, floor to waist to shoulder level.  -     LTG 5 To be more aware of posture and posture correction technique.  -     LTG 6 I with final HEP.  -     LTG 7 D/C with free 30 day fitness formula membership.  -     Time Calculation    PT Goal Re-Cert Due Date 04/12/17  -       User Key  (r) = Recorded By, (t) = Taken By, (c) = Cosigned By    Initials Name Provider Type    MOHAN Brooke PT Physical Therapist        Barriers to Rehab: None noted.    Safety Issues: None noted.            PT Assessment/Plan       03/22/17  1000       PT Assessment    Functional Limitations Limitation in home management;Limitations in community activities;Performance in leisure activities;Performance in work activities  -     Impairments Endurance;Impaired flexibility;Impaired muscle length;Impaired muscle power;Impaired postural alignment;Motor function;Muscle strength;Pain;Poor body mechanics;Posture;Range of motion  -     Assessment Comments No formal ther ex secondary to patient's secondary insurance not allowing treatment to begin without authorization.  -     Rehab Potential Excellent  -     Patient would benefit from skilled therapy intervention Yes  -AJ     PT Plan    PT Frequency 2x/week  -     Predicted Duration of Therapy Intervention (days/wks) 3-4 weeks  -     Planned CPT's? PT EVAL MOD COMPLELITY: 08940;PT RE-EVAL: 98622;PT THER PROC EA 15 MIN: 28437;PT THER ACT EA 15 MIN: 18573;PT MANUAL THERAPY EA 15 MIN: 05628;PT NEUROMUSC RE-EDUCATION EA 15 MIN: 35796;PT ELECTRICAL STIM UNATTEND: ;PT THER SUPP EA 15 MIN  -     Physical Therapy Interventions (Optional Details) dry needling;gross motor skills;home exercise program;joint mobilization;lumbar stabilization;modalities;patient/family education;postural re-education;ROM (Range of Motion);strengthening;stretching;swiss ball techniques  -     PT Plan Comments Establish HEP next session.  -       User Key  (r) = Recorded By, (t) = Taken By, (c) = Cosigned By    Initials Name Provider Type    MOHAN Brooke PT Physical Therapist         Other therapeutic activities and/or exercises will be prescribed depending on the patients progress or lack there of.          Modalities       03/22/17 0900          Ice    Patient denies application of Ice Yes  -        User Key  (r) = Recorded By, (t) = Taken By, (c) = Cosigned By    Initials Name Provider Type    MOHAN Brooke, PT Physical Therapist              Exercises       03/22/17 0900          Subjective  "Comments    Subjective Comments Patient wishes to get rid of the pain and \"be like it was\".  -AJ      Subjective Pain    Able to rate subjective pain? yes  -      Pre-Treatment Pain Level 0  -      Post-Treatment Pain Level 0  -        User Key  (r) = Recorded By, (t) = Taken By, (c) = Cosigned By    Initials Name Provider Type    MOHAN Brooke PT Physical Therapist                              Outcome Measures       03/22/17 1000          Modified Oswestry    Modified Oswestry Score/Comments 14%  -      Functional Assessment    Outcome Measure Options Modifed Owestry  -        User Key  (r) = Recorded By, (t) = Taken By, (c) = Cosigned By    Initials Name Provider Type    MOHAN Brooke PT Physical Therapist            Time Calculation:   Start Time: 0859  Stop Time: 0918  Time Calculation (min): 19 min     Therapy Charges for Today     Code Description Service Date Service Provider Modifiers Qty    57274369215  PT EVAL MOD COMPLEXITY 1 3/22/2017 Palmira Brooke, PT GP 1    [id number]  PT THER SUPP EA 15 MIN 3/22/2017 Palmira Brooke PT GP 1          PT G-Codes  Outcome Measure Options: Modifed Owestry         Palmira Brooke, PT, DPT, CSCS  3/22/2017      "

## 2017-03-29 DIAGNOSIS — M54.50 ACUTE BILATERAL LOW BACK PAIN WITHOUT SCIATICA: Primary | ICD-10-CM

## 2017-04-05 ENCOUNTER — HOSPITAL ENCOUNTER (OUTPATIENT)
Dept: PHYSICAL THERAPY | Facility: HOSPITAL | Age: 27
Setting detail: THERAPIES SERIES
Discharge: HOME | End: 2017-04-05

## 2017-04-05 DIAGNOSIS — M54.50 ACUTE BILATERAL LOW BACK PAIN WITHOUT SCIATICA: Primary | ICD-10-CM

## 2017-04-05 PROCEDURE — 97110 THERAPEUTIC EXERCISES: CPT

## 2017-04-05 NOTE — PROGRESS NOTES
Outpatient Physical Therapy Ortho Treatment Note  [facility]  Joyce Medina PTA       Patient Name: Sofiya Almaguer  : 1990  MRN: [id number]  Today's Date: 2017      Visit Date: 2017     Visits: 2/2  Insurance Visits Approved: 7 visits  Recert Due: 2017  MD Appt: TBD  Pain: pretreatment 0/10; post treatment 0/10  Improvement: pt is subjectively reporting 0% improvement since initial evaluation    Visit Dx:    ICD-10-CM ICD-9-CM   1. Acute bilateral low back pain without sciatica M54.5 724.2     338.19       Patient Active Problem List   Diagnosis   (none) - all problems resolved or deleted        Past Medical History:   Diagnosis Date   • Abnormal liver function    • Abnormal weight gain    • Acne    • Cervical intraepithelial neoplasia grade 2     h/o   • Contraception    • Hyperlipidemia    • Vitamin D deficiency         Past Surgical History:   Procedure Laterality Date   • ADENOIDECTOMY     • CERVICAL BIOPSY  W/ LOOP ELECTRODE EXCISION     • INCISION AND DRAINAGE ABSCESS  2012    simple I&D   • LEEP  2007    LEEP cone biopsy. moderate cervical dysplasia   • OTHER SURGICAL HISTORY  2011    remove impacted cerumen   • TONSILLECTOMY               PT Ortho       17 1300    Subjective Pain    Able to rate subjective pain? yes  -    Pre-Treatment Pain Level 0  -    Post-Treatment Pain Level 0  -      User Key  (r) = Recorded By, (t) = Taken By, (c) = Cosigned By    Initials Name Provider Type     Joyce Medina PTA Physical Therapy Assistant                            PT Assessment/Plan       17 1300       PT Assessment    Assessment Comments written copies of HEP issued with patient verbalizing good understanding of HEP. has no increase in complaints of pain throughout.    -     PT Plan    PT Frequency 2x/week  -     PT Plan Comments next visit DK ball rolls  -       User Key  (r) = Recorded By, (t) = Taken By, (c) =  Cosigned By    Initials Name Provider Type     Joyce EDITH Medina PTA Physical Therapy Assistant                Modalities       04/05/17 1300          Ice    Patient denies application of Ice Yes  -        User Key  (r) = Recorded By, (t) = Taken By, (c) = Cosigned By    Initials Name Provider Type    KATHRYN SHARMA GEOVANI Medina Physical Therapy Assistant                Exercises       04/05/17 1300          Subjective Comments    Subjective Comments states that she is tired today. No pain right now.  -      Subjective Pain    Able to rate subjective pain? yes  -      Pre-Treatment Pain Level 0  -      Post-Treatment Pain Level 0  -MH      Exercise 1    Exercise Name 1 Pro II LE Seat 5  -MH      Resistance 1 --   L 4.0  -MH      Time (Minutes) 1 10 minutes  -MH      Exercise 2    Exercise Name 2 B St. HS Stretch  -MH      Reps 2 2  -MH      Time (Seconds) 2 30 sec hold  -MH      Exercise 3    Exercise Name 3 B Seated Piriformis Stretch  -MH      Reps 3 2  -MH      Time (Seconds) 3 30 sec hold  -MH      Exercise 4    Exercise Name 4 Sit to Stand with Lx Ext  -MH      Sets 4 2  -MH      Reps 4 10  -MH      Time (Seconds) 4 5 sec hold  -MH      Exercise 5    Exercise Name 5 LTR  -MH      Reps 5 10  -MH      Time (Seconds) 5 10 sec hold  -MH      Exercise 6    Exercise Name 6 Bridges with Add Squeeze  -MH      Sets 6 2  -MH      Reps 6 10  -MH      Time (Seconds) 6 5 sec hold  -MH      Exercise 7    Exercise Name 7 BKLL  -MH      Sets 7 2  -MH      Reps 7 10  -MH      Time (Seconds) 7 5 sec hold  -MH        User Key  (r) = Recorded By, (t) = Taken By, (c) = Cosigned By    Initials Name Provider Type     Joyce EDITH Medina PTA Physical Therapy Assistant                               PT OP Goals       04/05/17 1300       PT Short Term Goals    STG Date to Achieve 04/12/17  -     STG 1 I with HEP and have additions/changes by next recertification.  -     STG 1 Progress Partially Met  -     STG 2 AROM lumbar flexion  95°, fingers to the toes.  -     STG 2 Progress Progressing  -     STG 3 AROM lumbar extension 40° no increase in pain.  -     STG 3 Progress Progressing  -     STG 4 B hip flexion 5/5.  -     STG 4 Progress Progressing  -     STG 5 More aware of posture and posture correction technique.  -     STG 5 Progress Progressing  -     Long Term Goals    LTG Date to Achieve 04/19/17  -     LTG 1 No TTP in lumbar spine.  -     LTG 1 Progress Progressing  -     LTG 2 AROM of lumbar spine all WNL no pain.  -     LTG 2 Progress Progressing  -     LTG 3 B LE 5/5 no pain.  -     LTG 3 Progress Progressing  -     LTG 4 Able to show proper lifingtwchnique at the work station 20#, floor to waist to shoulder level.  -     LTG 4 Progress Progressing  -     LTG 5 To be more aware of posture and posture correction technique.  -     LTG 5 Progress Progressing  -     LTG 6 I with final HEP.  -     LTG 6 Progress Progressing  -     LTG 7 D/C with free 30 day fitness formula membership.  -     Time Calculation    PT Goal Re-Cert Due Date 04/12/17  -       User Key  (r) = Recorded By, (t) = Taken By, (c) = Cosigned By    Initials Name Provider Type     Joyce Medina PTA Physical Therapy Assistant                Therapy Education       04/05/17 1400          Therapy Education    Given HEP;Symptoms/condition management;Posture/body mechanics;Pain management  -      Program New   therex completed during treatment issued for HEP  -      How Provided Verbal;Written;Demonstration  -      Provided to Patient  -      Level of Understanding Verbalized;Demonstrated;Teach back education performed  -        User Key  (r) = Recorded By, (t) = Taken By, (c) = Cosigned By    Initials Name Provider Type     Joyce Medina PTA Physical Therapy Assistant                Time Calculation:   Start Time: 1350  Stop Time: 1433  Time Calculation (min): 43 min  Total Timed Code Minutes- PT: 43  minute(s)    Therapy Charges for Today     Code Description Service Date Service Provider Modifiers Qty    61302146775 HC PT THER PROC EA 15 MIN 4/5/2017 Joyce Medina, PTA GP 3    68450201588 HC PT THER SUPP EA 15 MIN 4/5/2017 Joyce Medina PTA GP 1                    Joyce Medina, GEOVANI  4/5/2017

## 2017-04-07 ENCOUNTER — APPOINTMENT (OUTPATIENT)
Dept: PHYSICAL THERAPY | Facility: HOSPITAL | Age: 27
End: 2017-04-07

## 2017-04-12 ENCOUNTER — APPOINTMENT (OUTPATIENT)
Dept: PHYSICAL THERAPY | Facility: HOSPITAL | Age: 27
End: 2017-04-12

## 2017-04-13 ENCOUNTER — HOSPITAL ENCOUNTER (OUTPATIENT)
Dept: PHYSICAL THERAPY | Facility: HOSPITAL | Age: 27
Setting detail: THERAPIES SERIES
Discharge: HOME | End: 2017-04-13

## 2017-04-13 DIAGNOSIS — M54.50 ACUTE BILATERAL LOW BACK PAIN WITHOUT SCIATICA: Primary | ICD-10-CM

## 2017-04-13 PROCEDURE — 97110 THERAPEUTIC EXERCISES: CPT | Performed by: PHYSICAL THERAPY ASSISTANT

## 2017-04-13 NOTE — PROGRESS NOTES
Outpatient Physical Therapy Ortho Treatment Note  [facility]     Patient Name: Sofiya Almaguer  : 1990  MRN: [id number]  Today's Date: 2017      Visit Date: 2017    Visits 3/5   Recert Date 17   MD appointment    Pt reports  --% improvement       Visit Dx:    ICD-10-CM ICD-9-CM   1. Acute bilateral low back pain without sciatica M54.5 724.2     338.19       Patient Active Problem List   Diagnosis   (none) - all problems resolved or deleted        Past Medical History:   Diagnosis Date   • Abnormal liver function    • Abnormal weight gain    • Acne    • Cervical intraepithelial neoplasia grade 2     h/o   • Contraception    • Hyperlipidemia    • Vitamin D deficiency         Past Surgical History:   Procedure Laterality Date   • ADENOIDECTOMY     • CERVICAL BIOPSY  W/ LOOP ELECTRODE EXCISION     • INCISION AND DRAINAGE ABSCESS  2012    simple I&D   • LEEP  2007    LEEP cone biopsy. moderate cervical dysplasia   • OTHER SURGICAL HISTORY  2011    remove impacted cerumen   • TONSILLECTOMY               PT Ortho       17 1100    Subjective Pain    Pre-Treatment Pain Level 0  -    Post-Treatment Pain Level 0  -      User Key  (r) = Recorded By, (t) = Taken By, (c) = Cosigned By    Initials Name Provider Type    TOMASZ Reddy PTA Physical Therapy Assistant                            PT Assessment/Plan       17 1100       PT Assessment    Assessment Comments no new goals met, Pt able to complete all Ther ex with no increase in pain.  -     PT Plan    PT Plan Comments prone press up as grisel.  -       User Key  (r) = Recorded By, (t) = Taken By, (c) = Cosigned By    Initials Name Provider Type    TOMASZ Reddy PTA Physical Therapy Assistant                    Exercises       17 1100          Subjective Comments    Subjective Comments Pt reports complaint with HEP, no new changes  -      Subjective Pain    Able to rate  subjective pain? yes  -JH      Pre-Treatment Pain Level 0  -JH      Post-Treatment Pain Level 0  -JH      Exercise 1    Exercise Name 1 Pro II LE Seat 5  -JH      Resistance 1 --   L 4.5  -JH      Time (Minutes) 1 10 minutes  -JH      Exercise 2    Exercise Name 2 B St. HS Stretch  -JH      Reps 2 2  -JH      Time (Seconds) 2 30 sec hold  -JH      Exercise 3    Exercise Name 3 B Seated Piriformis Stretch  -JH      Reps 3 2  -JH      Time (Seconds) 3 30 sec hold  -JH      Exercise 4    Exercise Name 4 Sit to Stand with Lx Ext  -JH      Sets 4 2  -JH      Reps 4 10  -JH      Time (Seconds) 4 5 sec hold  -JH      Exercise 5    Exercise Name 5 LTR with ADD  -JH      Reps 5 10  -JH      Time (Seconds) 5 10 sec hold  -JH      Exercise 6    Exercise Name 6 Bridges with Add Squeeze  -JH      Sets 6 2  -JH      Reps 6 10  -JH      Time (Seconds) 6 5 sec hold  -JH      Exercise 7    Exercise Name 7 BKLL  -JH      Sets 7 2  -JH      Reps 7 10  -JH      Time (Seconds) 7 5 sec hold  -JH      Exercise 8    Exercise Name 8 DKTC with PB  -JH      Sets 8 2  -JH      Reps 8 10  -JH      Exercise 9    Exercise Name 9 B supine SLR FF  -JH      Reps 9 10  -JH      Exercise 10    Exercise Name 10 prone heel squeeze  -JH      Sets 10 2  -JH      Reps 10 10  -JH      Exercise 11    Exercise Name 11 NELSON  -JH      Time (Minutes) 11 4  -JH      Exercise 12    Exercise Name 12 seated hip ADD  -JH      Reps 12 20  -JH        User Key  (r) = Recorded By, (t) = Taken By, (c) = Cosigned By    Initials Name Provider Type     Martinez Reddy PTA Physical Therapy Assistant                               PT OP Goals       04/13/17 1100       PT Short Term Goals    STG Date to Achieve 04/12/17  -     STG 1 I with HEP and have additions/changes by next recertification.  -     STG 1 Progress Partially Met  -     STG 2 AROM lumbar flexion 95°, fingers to the toes.  -     STG 2 Progress Progressing  -     STG 3 AROM lumbar extension 40° no  increase in pain.  -     STG 3 Progress Progressing  -     STG 4 B hip flexion 5/5.  -     STG 4 Progress Progressing  -     STG 5 More aware of posture and posture correction technique.  -     STG 5 Progress Progressing  -     Long Term Goals    LTG Date to Achieve 04/19/17  -     LTG 1 No TTP in lumbar spine.  -     LTG 1 Progress Progressing  -     LTG 2 AROM of lumbar spine all WNL no pain.  -     LTG 2 Progress Progressing  -     LTG 3 B LE 5/5 no pain.  -     LTG 3 Progress Progressing  -     LTG 4 Able to show proper lifingtwchnique at the work station 20#, floor to waist to shoulder level.  -     LTG 4 Progress Progressing  -     LTG 5 To be more aware of posture and posture correction technique.  -     LTG 5 Progress Progressing  -     LTG 6 I with final HEP.  -     LTG 6 Progress Progressing  -     LTG 7 D/C with free 30 day fitness formula membership.  -     Time Calculation    PT Goal Re-Cert Due Date 04/12/17  -       User Key  (r) = Recorded By, (t) = Taken By, (c) = Cosigned By    Initials Name Provider Type     Martinez Reddy PTA Physical Therapy Assistant                    Time Calculation:   Start Time: 1101  Stop Time: 1155  Time Calculation (min): 54 min    Therapy Charges for Today     Code Description Service Date Service Provider Modifiers Qty    02083813001  PT THER PROC EA 15 MIN 4/13/2017 Martinez Reddy PTA GP 4                    Martinez Reddy PTA  4/13/2017

## 2017-04-17 ENCOUNTER — HOSPITAL ENCOUNTER (OUTPATIENT)
Dept: PHYSICAL THERAPY | Facility: HOSPITAL | Age: 27
Setting detail: THERAPIES SERIES
Discharge: HOME | End: 2017-04-17

## 2017-04-17 DIAGNOSIS — M54.50 ACUTE BILATERAL LOW BACK PAIN WITHOUT SCIATICA: Primary | ICD-10-CM

## 2017-04-17 PROCEDURE — 97110 THERAPEUTIC EXERCISES: CPT | Performed by: PHYSICAL THERAPIST

## 2017-04-17 NOTE — PROGRESS NOTES
Outpatient Physical Therapy Ortho Progress Note  [facility]  Palmira Brooke, PT, DPT, CSCS       Patient Name: Sofiya Almaguer  : 1990  MRN: [id number]  Today's Date: 2017      Visit Date: 2017     Pt reports 0/10 pain.  Reports 30% of improvement.  Attended 4/4 visits.  Insurance available: 7 visits  Next MD appt: 2017.  Recertification: N/A    Patient Active Problem List   Diagnosis   (none) - all problems resolved or deleted        Past Medical History:   Diagnosis Date   • Abnormal liver function    • Abnormal weight gain    • Acne    • Cervical intraepithelial neoplasia grade 2     h/o   • Contraception    • Hyperlipidemia    • Vitamin D deficiency         Past Surgical History:   Procedure Laterality Date   • ADENOIDECTOMY     • CERVICAL BIOPSY  W/ LOOP ELECTRODE EXCISION     • INCISION AND DRAINAGE ABSCESS  2012    simple I&D   • LEEP  2007    LEEP cone biopsy. moderate cervical dysplasia   • OTHER SURGICAL HISTORY  2011    remove impacted cerumen   • TONSILLECTOMY         Visit Dx:     ICD-10-CM ICD-9-CM   1. Acute bilateral low back pain without sciatica M54.5 724.2     338.19     Number of days off work: None.    Changes to medications: None noted.    Changes to MD orders: None noted.            PT Ortho       17 1100    Posture/Observations    Posture- WNL Posture is WNL  -AJ    Posture/Observations Comments Improved overall postural awareness.  -AJ    Sensation    Sensation WNL? WNL  -AJ    Light Touch No apparent deficits  -AJ    Sharp/Dull No apparent deficits  -AJ    Additional Comments Patient denies and numbness or tingling.  -AJ    DTR- Lower Quarter Clearing    Patellar tendon (L2-4) Bilateral:;2- Normal response  -AJ    Achilles tendon (S1-2) Bilateral:;2- Normal response  -AJ    Special Tests/Palpation    Special Tests/Palpation --   No areas of specific TTP.  -AJ    Lumbar/SI Special Tests    Standing Flexion  Test (SI Dysfunction) Negative  -AJ    Stork Test (SI Dysfunction) Bilateral:;Negative  -AJ    Trendelenburg Test (Gluteus Medius Weakness) Bilateral:;Negative  -AJ    Slump Test (Neural Tension) Bilateral:;Negative  -AJ    Amber Newton Test (HNP) Negative  -AJ    SLR (Neural Tension) Bilateral:;Negative  -AJ    SI Compression Test (SI Dysfunction) Bilateral:;Negative  -AJ    SI Distraction Test (SI Dysfunction) Bilateral:;Negative  -AJ    FELICIA (hip vs. SI Dysfunction) Bilateral:;Negative  -AJ    FAIR Test (Piriformis Syndrome) Bilateral:;Negative  -AJ    Sacral Spring Test (SI Dysfunction) Negative  -AJ    Bren's Signs    Superficial and non-anatomical tenderness Negative  -AJ    Simulation test Negative  -AJ    Distraction straight leg raise test (sitting vs supine) Negative  -AJ    Regional disturbances Negative  -AJ    Overreaction to examination Negative  -AJ    Leg Length Test    Apparent Equal   Pelvis appears to be level.  -AJ    Trunk    Flexion AROM WNL (0-80 degrees)   97°  -AJ    Extension AROM WNL (0-30 degrees)   40°  -AJ    Left Lateral Flexion AROM WNL (0-35 degrees)  -AJ    Right Lateral Flexion AROM WNL (0-35 degrees)  -AJ    Left Rotation AROM WNL (0-45 degrees)  -AJ    Right Rotation AROM WNL (0-45 degrees)  -AJ    MMT (Manual Muscle Testing)    General MMT Assessment Detail B LE 5/5 no change or cause of pain.  -AJ    Lower Extremity Flexibility    Hamstrings Bilateral:;WNL  -AJ    Transfers    Transfer, Comment I with all transfers  -AJ    Gait Assessment/Treatment    Gait, Comment FWB, non-antalgic gait.  -AJ      User Key  (r) = Recorded By, (t) = Taken By, (c) = Cosigned By    Initials Name Provider Type    AJ Palmira Brooke PT Physical Therapist                  Therapy Education       04/17/17 1100          Therapy Education    Given HEP;Symptoms/condition management;Pain management;Posture/body mechanics   Plan of Care  -      Program Reinforced  -AJ      How Provided Verbal   "-AJ      Provided to Patient  -AJ      Level of Understanding Verbalized  -AJ        User Key  (r) = Recorded By, (t) = Taken By, (c) = Cosigned By    Initials Name Provider Type    MOHAN Brooke, PT Physical Therapist                PT OP Goals       04/17/17 1101 04/17/17 1100    PT Short Term Goals    STG Date to Achieve 05/05/17  -AJ 04/12/17  -AJ    STG 1 Patient able to show proper lifting technique floor to waist toshoulder level 20# x10, no increase in pain.  -AJ I with HEP and have additions/changes by next recertification.  -AJ    STG 1 Progress Ongoing  -AJ Met  -AJ    STG 2 patient able to perform 3-way planks 10\" hold each x10 with no increase in pain.  -AJ AROM lumbar flexion 95°, fingers to the toes.  -AJ    STG 2 Progress New  -AJ Met  -AJ    STG 3 patient able to perform push/pull sled 100# F/R 40' x10 no increase in pain.  -AJ AROM lumbar extension 40° no increase in pain.  -AJ    STG 3 Progress New  -AJ Met  -AJ    STG 4 I with final HEP.  -AJ B hip flexion 5/5.  -AJ    STG 4 Progress Ongoing  -AJ Met  -AJ    STG 5 D/C with free 30 day fitness formula membership.  -AJ More aware of posture and posture correction technique.  -AJ    STG 5 Progress Ongoing  -AJ Met  -AJ    Long Term Goals    LTG Date to Achieve  04/19/17  -AJ    LTG 1  No TTP in lumbar spine.  -AJ    LTG 1 Progress  Met  -AJ    LTG 2  AROM of lumbar spine all WNL no pain.  -AJ    LTG 2 Progress  Met  -AJ    LTG 3  B LE 5/5 no pain.  -AJ    LTG 3 Progress  Met  -AJ    LTG 4  Able to show proper lifingtwchnique at the work station 20#, floor to waist to shoulder level.  -AJ    LTG 4 Progress  Progressing;Not Met  -AJ    LTG 5  To be more aware of posture and posture correction technique.  -AJ    LTG 5 Progress  Met  -AJ    LTG 6  I with final HEP.  -AJ    LTG 6 Progress  Progressing  -AJ    LTG 7  D/C with free 30 day fitness formula membership.  -AJ    Time Calculation    PT Goal Re-Cert Due Date --   N/A  -AJ       User Key "  (r) = Recorded By, (t) = Taken By, (c) = Cosigned By    Initials Name Provider Type    MOHAN Brooke PT Physical Therapist         Barriers to Rehab: The patient's generally deconditioned state    Safety Issues: None noted.          PT Assessment/Plan       04/17/17 1100       PT Assessment    Functional Limitations Limitation in home management;Performance in leisure activities;Performance in work activities  -     Impairments Endurance;Posture;Poor body mechanics;Pain;Impaired muscle endurance  -     Assessment Comments Met most ROm and stength goals. Did well with increase in ther ex. Will progress to more functinal goals.  -     Rehab Potential Excellent  -     Patient/caregiver participated in establishment of treatment plan and goals Yes  -AJ     Patient would benefit from skilled therapy intervention Yes  -AJ     PT Plan    PT Frequency 2x/week  -AJ     Predicted Duration of Therapy Intervention (days/wks) 2 weeks, 4 more visits  -AJ     Planned CPT's? PT RE-EVAL: 71271;PT THER PROC EA 15 MIN: 85762;PT THER ACT EA 15 MIN: 89159;PT MANUAL THERAPY EA 15 MIN: 65381;PT ELECTRICAL STIM UNATTEND: ;PT THER SUPP EA 15 MIN  -     Physical Therapy Interventions (Optional Details) stretching;strengthening;ROM (Range of Motion);postural re-education;patient/family education;lumbar stabilization  -     PT Plan Comments D/C at the end of next week with a final HEP and a free 30 day fitness formula membership. Progress to higher level activities.  -       User Key  (r) = Recorded By, (t) = Taken By, (c) = Cosigned By    Initials Name Provider Type    MOHAN Brooke PT Physical Therapist       Other therapeutic activities and/or exercises will be prescribed depending on the patients progress or lack there of.          Modalities       04/17/17 1100          Ice    Patient denies application of Ice Yes  -        User Key  (r) = Recorded By, (t) = Taken By, (c) = Cosigned By     "Initials Name Provider Type    AJ Palmira Brooke, PT Physical Therapist              Exercises       04/17/17 1100          Subjective Comments    Subjective Comments patient reports she's trying to be more aware of her posture and reports her back only really bothers her standing on her feet at work.  -AJ      Subjective Pain    Able to rate subjective pain? yes  -AJ      Pre-Treatment Pain Level 0  -AJ      Post-Treatment Pain Level 0   \"Feel pretty good.\"  -AJ      Exercise 1    Exercise Name 1 Pro II LE Seat 5  -AJ      Resistance 1 --   level 5.0  -AJ      Time (Minutes) 1 10 minutes  -AJ      Exercise 2    Exercise Name 2 B St. HS Stretch  -AJ      Reps 2 2  -AJ      Time (Seconds) 2 30 sec hold  -AJ      Exercise 3    Exercise Name 3 B Seated Piriformis Stretch  -AJ      Reps 3 2  -AJ      Time (Seconds) 3 30 sec hold  -AJ      Exercise 4    Exercise Name 4 Bridges with UE \"X\"  -AJ      Reps 4 20  -AJ      Time (Seconds) 4 5\" hold  -AJ      Exercise 5    Exercise Name 5 Prone alt LE  -AJ      Sets 5 2  -AJ      Reps 5 10  -AJ      Time (Seconds) 5 5\" hold  -AJ      Exercise 6    Exercise Name 6 B Step up fwd with opp hip ext.  -AJ      Equipment 6 --   6\" step  -AJ      Reps 6 15  -AJ      Exercise 7    Exercise Name 7 B Step up lat with opp hip abd  -AJ      Equipment 7 --   6\" step  -AJ      Reps 7 15  -AJ      Exercise 8    Exercise Name 8 Track Walk: Fwd  -AJ      Reps 8 4   laps  -AJ      Exercise 9    Exercise Name 9 Prone TKE with GS  -AJ      Reps 9 20  -AJ      Time (Seconds) 9 5\" hold  -AJ      Exercise 10    Exercise Name 10 Prone Heel squeezes  -AJ      Reps 10 20  -AJ      Time (Seconds) 10 5\" hold  -AJ      Exercise 11    Exercise Name 11 Prone hip IR with T-band  -AJ      Equipment 11 Theraband  -AJ      Resistance 11 Red  -AJ      Reps 11 20  -AJ      Time (Seconds) 11 5\" hold  -AJ      Exercise 12    Exercise Name 12 Mod prone planks  -AJ      Reps 12 10  -AJ      Time (Seconds) 12 10\" " hold  -        User Key  (r) = Recorded By, (t) = Taken By, (c) = Cosigned By    Initials Name Provider Type    MOHAN Brooke PT Physical Therapist                              Outcome Measures       04/17/17 1100          Modified Oswestry    Modified Oswestry Score/Comments 16%  -      Functional Assessment    Outcome Measure Options Modifed Owestry  -MOHAN        User Key  (r) = Recorded By, (t) = Taken By, (c) = Cosigned By    Initials Name Provider Type    MOHAN Brooke PT Physical Therapist            Time Calculation:   Start Time: 1049  Stop Time: 1144  Time Calculation (min): 55 min  Total Timed Code Minutes- PT: 55 minute(s)     Therapy Charges for Today     Code Description Service Date Service Provider Modifiers Qty    87055892999 HC PT THER PROC EA 15 MIN 4/17/2017 Palmira Brooke, PT GP 4    86611074359 HC PT THER SUPP EA 15 MIN 4/17/2017 Palmira Brooke, PT GP 1          PT G-Codes  Outcome Measure Options: Modifed Meray         Palmira Brooke, PT, DPT, CSCS  4/17/2017

## 2017-04-19 ENCOUNTER — HOSPITAL ENCOUNTER (OUTPATIENT)
Dept: PHYSICAL THERAPY | Facility: HOSPITAL | Age: 27
Setting detail: THERAPIES SERIES
Discharge: HOME | End: 2017-04-19

## 2017-04-19 DIAGNOSIS — M54.50 ACUTE BILATERAL LOW BACK PAIN WITHOUT SCIATICA: Primary | ICD-10-CM

## 2017-04-19 PROCEDURE — 97110 THERAPEUTIC EXERCISES: CPT

## 2017-04-19 NOTE — PROGRESS NOTES
Outpatient Physical Therapy Ortho Treatment Note  [facility]  Joyce Medina PTA       Patient Name: Sofiya Almaguer  : 1990  MRN: [id number]  Today's Date: 2017      Visit Date: 2017     Visits: 5/5  Insurance Visits Approved: 7 visits  Recert Due: N/A  MD Appt: 2017  Pain: pretreatment 0/10; post treatment 0/10  Improvement: pt is subjectively reporting 30% improvement since initial evaluation    Visit Dx:    ICD-10-CM ICD-9-CM   1. Acute bilateral low back pain without sciatica M54.5 724.2     338.19       Patient Active Problem List   Diagnosis   (none) - all problems resolved or deleted        Past Medical History:   Diagnosis Date   • Abnormal liver function    • Abnormal weight gain    • Acne    • Cervical intraepithelial neoplasia grade 2     h/o   • Contraception    • Hyperlipidemia    • Vitamin D deficiency         Past Surgical History:   Procedure Laterality Date   • ADENOIDECTOMY     • CERVICAL BIOPSY  W/ LOOP ELECTRODE EXCISION     • INCISION AND DRAINAGE ABSCESS  2012    simple I&D   • LEEP  2007    LEEP cone biopsy. moderate cervical dysplasia   • OTHER SURGICAL HISTORY  2011    remove impacted cerumen   • TONSILLECTOMY               PT Ortho       17 0800    Subjective Comments    Subjective Comments doing well. no complaints right now.   -    Subjective Pain    Able to rate subjective pain? yes  -    Pre-Treatment Pain Level 0  -    Post-Treatment Pain Level 0  -      17 1100    Posture/Observations    Posture- WNL Posture is WNL  -    Posture/Observations Comments Improved overall postural awareness.  -AJ    Sensation    Sensation WNL? WNL  -AJ    Light Touch No apparent deficits  -AJ    Sharp/Dull No apparent deficits  -AJ    Additional Comments Patient denies and numbness or tingling.  -AJ    DTR- Lower Quarter Clearing    Patellar tendon (L2-4) Bilateral:;2- Normal response  -AJ    Achilles tendon  (S1-2) Bilateral:;2- Normal response  -AJ    Special Tests/Palpation    Special Tests/Palpation --   No areas of specific TTP.  -AJ    Lumbar/SI Special Tests    Standing Flexion Test (SI Dysfunction) Negative  -AJ    Stork Test (SI Dysfunction) Bilateral:;Negative  -AJ    Trendelenburg Test (Gluteus Medius Weakness) Bilateral:;Negative  -AJ    Slump Test (Neural Tension) Bilateral:;Negative  -AJ    Amber Newton Test (HNP) Negative  -AJ    SLR (Neural Tension) Bilateral:;Negative  -AJ    SI Compression Test (SI Dysfunction) Bilateral:;Negative  -AJ    SI Distraction Test (SI Dysfunction) Bilateral:;Negative  -AJ    FELICIA (hip vs. SI Dysfunction) Bilateral:;Negative  -AJ    FAIR Test (Piriformis Syndrome) Bilateral:;Negative  -AJ    Sacral Spring Test (SI Dysfunction) Negative  -AJ    Bren's Signs    Superficial and non-anatomical tenderness Negative  -AJ    Simulation test Negative  -AJ    Distraction straight leg raise test (sitting vs supine) Negative  -AJ    Regional disturbances Negative  -AJ    Overreaction to examination Negative  -AJ    Leg Length Test    Apparent Equal   Pelvis appears to be level.  -AJ    Trunk    Flexion AROM WNL (0-80 degrees)   97°  -AJ    Extension AROM WNL (0-30 degrees)   40°  -AJ    Left Lateral Flexion AROM WNL (0-35 degrees)  -AJ    Right Lateral Flexion AROM WNL (0-35 degrees)  -AJ    Left Rotation AROM WNL (0-45 degrees)  -AJ    Right Rotation AROM WNL (0-45 degrees)  -AJ    MMT (Manual Muscle Testing)    General MMT Assessment Detail B LE 5/5 no change or cause of pain.  -    Lower Extremity Flexibility    Hamstrings Bilateral:;WNL  -AJ    Transfers    Transfer, Comment I with all transfers  -    Gait Assessment/Treatment    Gait, Comment FWB, non-antalgic gait.  -      User Key  (r) = Recorded By, (t) = Taken By, (c) = Cosigned By    Initials Name Provider Type     Joyce Medina, GEOVANI Physical Therapy Assistant    MOHNA Brooke, PT Physical Therapist                             PT Assessment/Plan       04/19/17 0800       PT Assessment    Assessment Comments patient has excellent form with 3way planks and is able to complete with no cues for abdominial recruitment.   -     PT Plan    PT Frequency 2x/week  -     PT Plan Comments MD note next visit. D/C at the end of next week  -       User Key  (r) = Recorded By, (t) = Taken By, (c) = Cosigned By    Initials Name Provider Type     Joyce Medina PTA Physical Therapy Assistant                Modalities       04/19/17 0800          Ice    Patient denies application of Ice Yes  -        User Key  (r) = Recorded By, (t) = Taken By, (c) = Cosigned By    Initials Name Provider Type     Joyce Medina PTA Physical Therapy Assistant                Exercises       04/19/17 0800          Subjective Comments    Subjective Comments doing well. no complaints right now.   -      Subjective Pain    Able to rate subjective pain? yes  -      Pre-Treatment Pain Level 0  -MH      Post-Treatment Pain Level 0  -      Exercise 1    Exercise Name 1 Pro II LE Seat 5  -      Resistance 1 --   L 5.0  -MH      Time (Minutes) 1 10 minutes  -      Exercise 2    Exercise Name 2 B St. HS Stretch  -      Reps 2 2  -MH      Time (Seconds) 2 30 sec hold  -      Exercise 3    Exercise Name 3 B Seated Piriformis Stretch  -      Reps 3 2  -MH      Time (Seconds) 3 30 sec hold  -      Exercise 4    Exercise Name 4 Sled Push/Pull  -      Weights/Plates 4 --   100#  -      Time (Minutes) 4 5 minutes  -      Exercise 5    Exercise Name 5 Single Leg Bridge (alt)  -      Reps 5 10   each  -MH      Time (Seconds) 5 5 sec hold  -      Exercise 6    Exercise Name 6 3 way planks  -      Reps 6 10   each  -MH      Time (Seconds) 6 10 sec hold  -MH      Exercise 7    Exercise Name 7 LTR  -      Reps 7 10  -MH      Time (Seconds) 7 10 sec hold  -        User Key  (r) = Recorded By, (t) = Taken By, (c) = Cosigned By     "Initials Name Provider Type     Joyce Medina PTA Physical Therapy Assistant                               PT OP Goals       04/19/17 0800       PT Short Term Goals    STG Date to Achieve 05/05/17  -     STG 1 Patient able to show proper lifting technique floor to waist toshoulder level 20# x10, no increase in pain.  -     STG 1 Progress Ongoing  -     STG 2 patient able to perform 3-way planks 10\" hold each x10 with no increase in pain.  -     STG 2 Progress Met  -     STG 3 patient able to perform push/pull sled 100# F/R 40' x10 no increase in pain.  -     STG 3 Progress Met  -     STG 4 I with final HEP.  -     STG 4 Progress Ongoing  -     STG 5 D/C with free 30 day fitness formula membership.  -     STG 5 Progress Ongoing  -     Time Calculation    PT Goal Re-Cert Due Date --   N/A  -       User Key  (r) = Recorded By, (t) = Taken By, (c) = Cosigned By    Initials Name Provider Type     Joyce Medina PTA Physical Therapy Assistant                Therapy Education       04/19/17 0800          Therapy Education    Given HEP;Symptoms/condition management;Pain management;Posture/body mechanics  -      Program Reinforced  -      How Provided Verbal  -      Provided to Patient  -      Level of Understanding Verbalized  -        User Key  (r) = Recorded By, (t) = Taken By, (c) = Cosigned By    Initials Name Provider Type     Joyce Medina PTA Physical Therapy Assistant                Outcome Measures       04/17/17 1100          Modified Oswestry    Modified Oswestry Score/Comments 16%  -      Functional Assessment    Outcome Measure Options Richardson Pollard  -        User Key  (r) = Recorded By, (t) = Taken By, (c) = Cosigned By    Initials Name Provider Type    MOHAN Brooke, PT Physical Therapist            Time Calculation:   Start Time: 0801  Stop Time: 0847  Time Calculation (min): 46 min  Total Timed Code Minutes- PT: 46 minute(s)    Therapy Charges for " Today     Code Description Service Date Service Provider Modifiers Qty    42939825672 HC PT THER PROC EA 15 MIN 4/19/2017 Joyce Medina, PTA GP 3    29692289856 HC PT THER SUPP EA 15 MIN 4/19/2017 Joyce Medina PTA GP 1                    Joyce Medina, PTA  4/19/2017

## 2017-04-24 ENCOUNTER — HOSPITAL ENCOUNTER (OUTPATIENT)
Dept: PHYSICAL THERAPY | Facility: HOSPITAL | Age: 27
Setting detail: THERAPIES SERIES
Discharge: HOME | End: 2017-04-24

## 2017-04-24 DIAGNOSIS — M54.50 ACUTE BILATERAL LOW BACK PAIN WITHOUT SCIATICA: Primary | ICD-10-CM

## 2017-04-24 PROCEDURE — 97110 THERAPEUTIC EXERCISES: CPT | Performed by: SPECIALIST/TECHNOLOGIST

## 2017-04-24 NOTE — PROGRESS NOTES
Outpatient Physical Therapy Ortho Treatment Note  [facility]     Patient Name: Sofiya Almaguer  : 1990  MRN: [id number]  Today's Date: 2017      Visit Date: 2017   Patients reports pain as:  0/10   Patient reports overall % improvement as:  80% better    Patients attendance has been:      Insurance visits available    20  visits   Recert Date is:  N/A   Next MD visit is:  17       Visit Dx:    ICD-10-CM ICD-9-CM   1. Acute bilateral low back pain without sciatica M54.5 724.2     338.19       Patient Active Problem List   Diagnosis   (none) - all problems resolved or deleted        Past Medical History:   Diagnosis Date   • Abnormal liver function    • Abnormal weight gain    • Acne    • Cervical intraepithelial neoplasia grade 2     h/o   • Contraception    • Hyperlipidemia    • Vitamin D deficiency         Past Surgical History:   Procedure Laterality Date   • ADENOIDECTOMY     • CERVICAL BIOPSY  W/ LOOP ELECTRODE EXCISION     • INCISION AND DRAINAGE ABSCESS  2012    simple I&D   • LEEP  2007    LEEP cone biopsy. moderate cervical dysplasia   • OTHER SURGICAL HISTORY  2011    remove impacted cerumen   • TONSILLECTOMY                               PT Assessment/Plan       17 1100       PT Assessment    Assessment Comments (P)  to ther-ex.  good perf. w/ planks. grisel sled push pull  -ML     PT Plan    PT Frequency (P)  2x/week  -ML     PT Plan Comments (P)  pt progress can be obtain in [facility] by MD.  progress core stab.   -ML       User Key  (r) = Recorded By, (t) = Taken By, (c) = Cosigned By    Initials Name Provider Type    JOSE Villeda, [facility]                 Modalities       17 0900          Subjective Pain    Post-Treatment Pain Level (P)  0  -ML      Ice    Patient denies application of Ice (P)  Yes  -ML        User Key  (r) = Recorded By, (t) = Taken By, (c) = Cosigned By    Initials Name Provider Type    JOSE  "Thomas Villeda ATC                 Exercises       04/24/17 0900          Subjective Comments    Subjective Comments (P)  80% better. pain when I lay down certain ways.  otherwise better.  can't fully extend  -ML      Subjective Pain    Able to rate subjective pain? (P)  yes  -ML      Pre-Treatment Pain Level (P)  0  -ML      Post-Treatment Pain Level (P)  0  -ML      Exercise 1    Exercise Name 1 (P)  pro2 LE   -ML      Resistance 1 (P)  --   L4/0  -ML      Exercise 2    Exercise Name 2 (P)  B-St Ham S  -ML      Sets 2 (P)  2  -ML      Time (Seconds) 2 (P)  30 sec  -ML      Exercise 3    Exercise Name 3 (P)  B-sit pirif S  -ML      Sets 3 (P)  2  -ML      Time (Seconds) 3 (P)  30 sec  -ML      Exercise 4    Exercise Name 4 (P)  sit to stands w/ lx ext  -ML      Sets 4 (P)  2  -ML      Reps 4 (P)  10  -ML      Exercise 5    Exercise Name 5 (P)  Swiss: DK-C Rolls  -ML      Sets 5 (P)  2  -ML      Reps 5 (P)  10  -ML      Exercise 6    Exercise Name 6 (P)  Swiss: LTR  -ML      Reps 6 (P)  10  -ML      Exercise 7    Exercise Name 7 (P)  Track walk  -ML      Reps 7 (P)  --   4 laps  -ML      Exercise 8    Exercise Name 8 (P)  3 way Planks  -ML      Reps 8 (P)  10  -ML      Time (Seconds) 8 (P)  10 sec  -ML        User Key  (r) = Recorded By, (t) = Taken By, (c) = Cosigned By    Initials Name Provider Type    ML Thomas Villeda [facility]                                PT OP Goals       04/24/17 1100       PT Short Term Goals    STG Date to Achieve (P)  05/05/17  -ML     STG 1 (P)  Patient able to show proper lifting technique floor to waist toshoulder level 20# x10, no increase in pain.  -ML     STG 1 Progress (P)  Ongoing  -ML     STG 2 (P)  patient able to perform 3-way planks 10\" hold each x10 with no increase in pain.  -ML     STG 2 Progress (P)  Met  -ML     STG 3 (P)  patient able to perform push/pull sled 100# F/R 40' x10 no increase in pain.  -ML     STG 3 Progress (P)  Met  -ML     " STG 4 (P)  I with final HEP.  -ML     STG 4 Progress (P)  Ongoing  -ML     STG 5 (P)  D/C with free 30 day fitness formula membership.  -ML     STG 5 Progress (P)  Ongoing  -ML       User Key  (r) = Recorded By, (t) = Taken By, (c) = Cosigned By    Initials Name Provider Type    JOSE Villeda ATC                     Time Calculation:   Start Time: (P) 0935  Stop Time: (P) 1020  Time Calculation (min): (P) 45 min    Therapy Charges for Today     Code Description Service Date Service Provider Modifiers Qty    45104095432 HC PT THER PROC EA 15 MIN 4/24/2017 Thomas Villeda, ATC  3                    Thomas Villeda ATC  4/24/2017

## 2017-04-26 ENCOUNTER — HOSPITAL ENCOUNTER (OUTPATIENT)
Dept: PHYSICAL THERAPY | Facility: HOSPITAL | Age: 27
Setting detail: THERAPIES SERIES
Discharge: HOME | End: 2017-04-26

## 2017-04-26 ENCOUNTER — OFFICE VISIT (OUTPATIENT)
Dept: FAMILY MEDICINE CLINIC | Facility: CLINIC | Age: 27
End: 2017-04-26

## 2017-04-26 VITALS
TEMPERATURE: 98.3 F | SYSTOLIC BLOOD PRESSURE: 120 MMHG | RESPIRATION RATE: 20 BRPM | HEIGHT: 61 IN | WEIGHT: 143.8 LBS | OXYGEN SATURATION: 99 % | BODY MASS INDEX: 27.15 KG/M2 | DIASTOLIC BLOOD PRESSURE: 70 MMHG | HEART RATE: 79 BPM

## 2017-04-26 DIAGNOSIS — L70.9 ACNE, UNSPECIFIED ACNE TYPE: Primary | ICD-10-CM

## 2017-04-26 DIAGNOSIS — M54.50 ACUTE BILATERAL LOW BACK PAIN WITHOUT SCIATICA: Primary | ICD-10-CM

## 2017-04-26 DIAGNOSIS — E55.9 VITAMIN D DEFICIENCY: ICD-10-CM

## 2017-04-26 PROCEDURE — 99213 OFFICE O/P EST LOW 20 MIN: CPT | Performed by: NURSE PRACTITIONER

## 2017-04-26 PROCEDURE — 97110 THERAPEUTIC EXERCISES: CPT | Performed by: SPECIALIST/TECHNOLOGIST

## 2017-04-26 RX ORDER — CLINDAMYCIN AND BENZOYL PEROXIDE 10; 50 MG/G; MG/G
GEL TOPICAL 2 TIMES DAILY
Qty: 50 G | Refills: 11 | Status: SHIPPED | OUTPATIENT
Start: 2017-04-26 | End: 2018-12-28 | Stop reason: SDUPTHER

## 2017-04-26 RX ORDER — CLINDAMYCIN AND BENZOYL PEROXIDE 10; 50 MG/G; MG/G
GEL TOPICAL 2 TIMES DAILY
COMMUNITY
End: 2017-04-26 | Stop reason: SDUPTHER

## 2017-04-26 RX ORDER — IBUPROFEN 800 MG/1
800 TABLET ORAL EVERY 6 HOURS PRN
COMMUNITY
End: 2017-10-23

## 2017-04-26 NOTE — THERAPY DISCHARGE NOTE
Outpatient Physical Therapy Ortho Treatment Note/Discharge Summary  [facility]    Patient Name: Sofiya Almaguer  : 1990  MRN: [id number]  Today's Date: 2017      Visit Date: 2017   Patients reports pain as:  0/10   Patient reports overall % improvement as:  90%   Patients attendance has been:     Insurance visits available  7 visits   Recert Date is:  N/A   Next MD visit is:  17       Visit Dx:    ICD-10-CM ICD-9-CM   1. Acute bilateral low back pain without sciatica M54.5 724.2     338.19       Patient Active Problem List   Diagnosis   • Acne   • Vitamin D deficiency        Past Medical History:   Diagnosis Date   • Abnormal liver function    • Abnormal weight gain    • Acne    • Cervical intraepithelial neoplasia grade 2     h/o   • Contraception    • Hyperlipidemia    • Vitamin D deficiency         Past Surgical History:   Procedure Laterality Date   • ADENOIDECTOMY     • CERVICAL BIOPSY  W/ LOOP ELECTRODE EXCISION     • INCISION AND DRAINAGE ABSCESS  2012    simple I&D   • LEEP  2007    LEEP cone biopsy. moderate cervical dysplasia   • OTHER SURGICAL HISTORY  2011    remove impacted cerumen   • TONSILLECTOMY                               PT Assessment/Plan       17 1000       PT Assessment    Assessment Comments (P)  good perf. w/ exer.  grisel rx well. general fatigue.  confirms compliance with HEP   met all goals.  Pt reports ready to D/C and MD advised D/C   -ML     PT Plan    PT Plan Comments (P)  D/C refer to fitness HEP   see Low Back Oswestry paper&Patient Functional Scale: chart  -ML       User Key  (r) = Recorded By, (t) = Taken By, (c) = Cosigned By    Initials Name Provider Type    ML Thomas Villeda, ATC                     Exercises       17 0900          Subjective Comments    Subjective Comments (P)  90% better.  a little sore from last rx.  -ML      Subjective Pain    Able to rate subjective pain?  "(P)  yes  -ML      Pre-Treatment Pain Level (P)  0  -ML      Exercise 1    Exercise Name 1 (P)  pro2 LE s=6  -ML      Resistance 1 (P)  --   L5.0  -ML      Time (Minutes) 1 (P)  10 min  -ML      Exercise 2    Exercise Name 2 (P)  B-St. Ham S  -ML      Sets 2 (P)  2  -ML      Time (Seconds) 2 (P)  30 sec  -ML      Exercise 3    Exercise Name 3 (P)  B- sit pirif S  -ML      Sets 3 (P)  2  -ML      Time (Seconds) 3 (P)  30 sec  -ML      Exercise 4    Exercise Name 4 (P)  Swiss: DK-C rolls  -ML      Reps 4 (P)  20  -ML      Exercise 5    Exercise Name 5 (P)  Slovenian: B- LTR  -ML      Reps 5 (P)  10  -ML      Exercise 6    Exercise Name 6 (P)  [facility]  -ML      Weights/Plates 6 (P)  20  -ML      Reps 6 (P)  10  -ML      Exercise 7    Exercise Name 7 (P)  Prone Heel Squeeze w/ Hip IR (+/-)  -ML      Reps 7 (P)  10  -ML      Time (Seconds) 7 (P)  3 sec  -ML      Exercise 8    Exercise Name 8 (P)  Prone Press ups  -ML      Sets 8 (P)  2  -ML      Reps 8 (P)  10  -ML      Time (Seconds) 8 (P)  5 sec hold  -ML      Exercise 9    Exercise Name 9 (P)  Bridges w/ UE-X  -ML      Sets 9 (P)  2  -ML      Reps 9 (P)  10  -ML      Exercise 10    Exercise Name 10 (P)  Track walk  -ML      Reps 10 (P)  --   6 laps  -ML        User Key  (r) = Recorded By, (t) = Taken By, (c) = Cosigned By    Initials Name Provider Type    ML Thomas Villeda, ATC                                PT OP Goals       04/26/17 0900       PT Short Term Goals    STG Date to Achieve (P)  05/05/17  -ML     STG 1 (P)  Patient able to show proper lifting technique floor to waist toshoulder level 20# x10, no increase in pain.  -ML     STG 1 Progress (P)  Met  -ML     STG 1 Progress Comments (P)  met pain free  good mechanics demo'd  -ML     STG 2 (P)  patient able to perform 3-way planks 10\" hold each x10 with no increase in pain.  -ML     STG 2 Progress (P)  Met  -ML     STG 3 (P)  patient able to perform push/pull sled 100# F/R 40' x10 " no increase in pain.  -ML     STG 3 Progress (P)  Met  -ML     STG 4 (P)  I with final HEP.  -ML     STG 4 Progress (P)  Met  -ML     STG 5 (P)  D/C with free 30 day fitness formula membership.  -ML     STG 5 Progress (P)  Met  -ML     STG 5 Progress Comments (P)  HHQ and 1 mo coupon given to patient.   -ML       User Key  (r) = Recorded By, (t) = Taken By, (c) = Cosigned By    Initials Name Provider Type    JOSE Villeda ATC                 Therapy Education       04/26/17 1014          Therapy Education    Given (P)  HEP  -ML      Program (P)  Reinforced   discussed continued HEP with stretches and core stab with Indep fitness membership use  -ML      How Provided (P)  Verbal;Demonstration  -ML      Provided to (P)  Patient  -ML      Level of Understanding (P)  Verbalized  -ML        User Key  (r) = Recorded By, (t) = Taken By, (c) = Cosigned By    Initials Name Provider Type    JOSE Villeda ATC                 Time Calculation:   Start Time: (P) 0918  Stop Time: (P) 1014  Time Calculation (min): (P) 56 min  Total Timed Code Minutes- PT: (P) 56 minute(s)    Therapy Charges for Today     Code Description Service Date Service Provider Modifiers Qty    50191544850 HC PT THER SUPP EA 15 MIN 4/26/2017 Thomas Villeda ATC  1    97540624701 HC PT THER PROC EA 15 MIN 4/26/2017 Thomas Villeda ATC  4                       Thomas Villeda ATC  4/26/2017

## 2017-04-28 ENCOUNTER — APPOINTMENT (OUTPATIENT)
Dept: PHYSICAL THERAPY | Facility: HOSPITAL | Age: 27
End: 2017-04-28

## 2017-05-01 ENCOUNTER — APPOINTMENT (OUTPATIENT)
Dept: PHYSICAL THERAPY | Facility: HOSPITAL | Age: 27
End: 2017-05-01

## 2017-05-03 ENCOUNTER — APPOINTMENT (OUTPATIENT)
Dept: PHYSICAL THERAPY | Facility: HOSPITAL | Age: 27
End: 2017-05-03

## 2017-10-23 ENCOUNTER — OFFICE VISIT (OUTPATIENT)
Dept: FAMILY MEDICINE CLINIC | Facility: CLINIC | Age: 27
End: 2017-10-23

## 2017-10-23 VITALS
DIASTOLIC BLOOD PRESSURE: 71 MMHG | BODY MASS INDEX: 25.95 KG/M2 | WEIGHT: 141 LBS | OXYGEN SATURATION: 98 % | HEART RATE: 76 BPM | TEMPERATURE: 97.4 F | HEIGHT: 62 IN | SYSTOLIC BLOOD PRESSURE: 109 MMHG

## 2017-10-23 DIAGNOSIS — J04.0 LARYNGITIS: ICD-10-CM

## 2017-10-23 DIAGNOSIS — R05.9 COUGH: ICD-10-CM

## 2017-10-23 DIAGNOSIS — J30.9 ALLERGIC RHINITIS, UNSPECIFIED CHRONICITY, UNSPECIFIED SEASONALITY, UNSPECIFIED TRIGGER: Primary | ICD-10-CM

## 2017-10-23 PROCEDURE — 96372 THER/PROPH/DIAG INJ SC/IM: CPT | Performed by: NURSE PRACTITIONER

## 2017-10-23 PROCEDURE — 99213 OFFICE O/P EST LOW 20 MIN: CPT | Performed by: NURSE PRACTITIONER

## 2017-10-23 RX ORDER — INFLUENZA VIRUS VACCINE 15; 15; 15; 15 UG/.5ML; UG/.5ML; UG/.5ML; UG/.5ML
SUSPENSION INTRAMUSCULAR
COMMUNITY
Start: 2017-08-11 | End: 2018-04-12

## 2017-10-23 RX ORDER — TRIAMCINOLONE ACETONIDE 40 MG/ML
40 INJECTION, SUSPENSION INTRA-ARTICULAR; INTRAMUSCULAR ONCE
Status: COMPLETED | OUTPATIENT
Start: 2017-10-23 | End: 2017-10-23

## 2017-10-23 RX ADMIN — TRIAMCINOLONE ACETONIDE 40 MG: 40 INJECTION, SUSPENSION INTRA-ARTICULAR; INTRAMUSCULAR at 16:56

## 2017-10-23 NOTE — PATIENT INSTRUCTIONS
Allergic Rhinitis  Allergic rhinitis is when the mucous membranes in the nose respond to allergens. Allergens are particles in the air that cause your body to have an allergic reaction. This causes you to release allergic antibodies. Through a chain of events, these eventually cause you to release histamine into the blood stream. Although meant to protect the body, it is this release of histamine that causes your discomfort, such as frequent sneezing, congestion, and an itchy, runny nose.   CAUSES  Seasonal allergic rhinitis (hay fever) is caused by pollen allergens that may come from grasses, trees, and weeds. Year-round allergic rhinitis (perennial allergic rhinitis) is caused by allergens such as house dust mites, pet dander, and mold spores.  SYMPTOMS  · Nasal stuffiness (congestion).  · Itchy, runny nose with sneezing and tearing of the eyes.  DIAGNOSIS  Your health care provider can help you determine the allergen or allergens that trigger your symptoms. If you and your health care provider are unable to determine the allergen, skin or blood testing may be used. Your health care provider will diagnose your condition after taking your health history and performing a physical exam. Your health care provider may assess you for other related conditions, such as asthma, pink eye, or an ear infection.  TREATMENT  Allergic rhinitis does not have a cure, but it can be controlled by:  · Medicines that block allergy symptoms. These may include allergy shots, nasal sprays, and oral antihistamines.  · Avoiding the allergen.  Hay fever may often be treated with antihistamines in pill or nasal spray forms. Antihistamines block the effects of histamine. There are over-the-counter medicines that may help with nasal congestion and swelling around the eyes. Check with your health care provider before taking or giving this medicine.  If avoiding the allergen or the medicine prescribed do not work, there are many new medicines  your health care provider can prescribe. Stronger medicine may be used if initial measures are ineffective. Desensitizing injections can be used if medicine and avoidance does not work. Desensitization is when a patient is given ongoing shots until the body becomes less sensitive to the allergen. Make sure you follow up with your health care provider if problems continue.  HOME CARE INSTRUCTIONS  It is not possible to completely avoid allergens, but you can reduce your symptoms by taking steps to limit your exposure to them. It helps to know exactly what you are allergic to so that you can avoid your specific triggers.  SEEK MEDICAL CARE IF:  · You have a fever.  · You develop a cough that does not stop easily (persistent).  · You have shortness of breath.  · You start wheezing.  · Symptoms interfere with normal daily activities.     This information is not intended to replace advice given to you by your health care provider. Make sure you discuss any questions you have with your health care provider.     Document Released: 09/12/2002 Document Revised: 01/08/2016 Document Reviewed: 08/25/2014  [employer] Interactive Patient Education ©2017 [employer]    Laryngitis  Laryngitis is inflammation of your vocal cords. This causes hoarseness, coughing, loss of voice, sore throat, or a dry throat. Your vocal cords are two bands of muscles that are found in your throat. When you speak, these cords come together and vibrate. These vibrations come out through your mouth as sound. When your vocal cords are inflamed, your voice sounds different.  Laryngitis can be temporary (acute) or long-term (chronic). Most cases of acute laryngitis improve with time. Chronic laryngitis is laryngitis that lasts for more than three weeks.  CAUSES  Acute laryngitis may be caused by:  · A viral infection.  · Lots of talking, yelling, or singing. This is also called vocal strain.  · Bacterial infections.  Chronic laryngitis may be caused  by:  · Vocal strain.  · Injury to your vocal cords.  · Acid reflux (gastroesophageal reflux disease or GERD).  · Allergies.  · Sinus infection.  · Smoking.  · Alcohol abuse.  · Breathing in chemicals or dust.  · Growths on the vocal cords.  RISK FACTORS  Risk factors for laryngitis include:  · Smoking.  · Alcohol abuse.  · Having allergies.  SIGNS AND SYMPTOMS  Symptoms of laryngitis may include:  · Low, hoarse voice.  · Loss of voice.  · Dry cough.  · Sore throat.  · Stuffy nose.  DIAGNOSIS  Laryngitis may be diagnosed by:  · Physical exam.  · Throat culture.  · Blood test.  · Laryngoscopy. This procedure allows your health care provider to look at your vocal cords with a mirror or viewing tube.  TREATMENT  Treatment for laryngitis depends on what is causing it. Usually, treatment involves resting your voice and using medicines to soothe your throat. However, if your laryngitis is caused by a bacterial infection, you may need to take antibiotic medicine. If your laryngitis is caused by a growth, you may need to have a procedure to remove it.  HOME CARE INSTRUCTIONS  · Drink enough fluid to keep your urine clear or pale yellow.  · Breathe in moist air. Use a humidifier if you live in a dry climate.  · Take medicines only as directed by your health care provider.  · If you were prescribed an antibiotic medicine, finish it all even if you start to feel better.  · Do not smoke cigarettes or electronic cigarettes. If you need help quitting, ask your health care provider.  · Talk as little as possible. Also avoid whispering, which can cause vocal strain.  · Write instead of talking. Do this until your voice is back to normal.  SEEK MEDICAL CARE IF:  · You have a fever.  · You have increasing pain.  · You have difficulty swallowing.  SEEK IMMEDIATE MEDICAL CARE IF:  · You cough up blood.  · You have trouble breathing.     This information is not intended to replace advice given to you by your health care provider. Make  sure you discuss any questions you have with your health care provider.     Document Released: 12/18/2006 Document Revised: 01/08/2016 Document Reviewed: 06/02/2015  [employer] Interactive Patient Education ©2017 [employer]

## 2017-10-23 NOTE — PROGRESS NOTES
Subjective   Sofiya Almaguer is a 27 y.o. female.     HPI Comments: Patient is breast feeding 9 month old son.     Cough   This is a new problem. The current episode started in the past 7 days. The problem has been unchanged. The problem occurs hourly. The cough is non-productive. Associated symptoms include ear pain ( mild), nasal congestion ( mild), postnasal drip, rhinorrhea ( clear) and a sore throat ( scratchy). Pertinent negatives include no chest pain, chills, ear congestion, fever, headaches, heartburn, hemoptysis, myalgias, rash, shortness of breath, sweats, weight loss or wheezing. Nothing aggravates the symptoms. Treatments tried: cough drops, cepacol, humidifier. The treatment provided mild relief. Her past medical history is significant for environmental allergies. There is no history of asthma.   Hoarse   This is a new problem. The current episode started in the past 7 days. The problem occurs daily. The problem has been gradually improving. Associated symptoms include congestion, coughing and a sore throat ( scratchy). Pertinent negatives include no abdominal pain, anorexia, arthralgias, change in bowel habit, chest pain, chills, diaphoresis, fatigue, fever, headaches, joint swelling, myalgias, nausea, neck pain, numbness, rash, swollen glands, urinary symptoms, vertigo, visual change, vomiting or weakness. Treatments tried: cough drops, cepacol, humidifier. The treatment provided mild relief.        The following portions of the patient's history were reviewed and updated as appropriate: allergies, current medications, past medical history, past social history, past surgical history and problem list.    Review of Systems   Constitutional: Negative for appetite change, chills, diaphoresis, fatigue, fever and weight loss.   HENT: Positive for congestion, ear pain ( mild), hoarse voice, postnasal drip, rhinorrhea ( clear), sneezing and sore throat ( scratchy). Negative for nosebleeds, sinus pain  "and sinus pressure.    Eyes: Negative for discharge and itching.   Respiratory: Positive for cough. Negative for hemoptysis, chest tightness, shortness of breath and wheezing.    Cardiovascular: Negative for chest pain.   Gastrointestinal: Negative for abdominal pain, anorexia, change in bowel habit, diarrhea, heartburn, nausea and vomiting.   Musculoskeletal: Negative for arthralgias, joint swelling, myalgias, neck pain and neck stiffness.   Skin: Negative for rash.   Allergic/Immunologic: Positive for environmental allergies.   Neurological: Negative for dizziness, vertigo, weakness, numbness and headaches.   Hematological: Negative for adenopathy.       Objective    /71 (BP Location: Left arm, Patient Position: Sitting, Cuff Size: Adult)  Pulse 76  Temp 97.4 °F (36.3 °C) (Tympanic)   Ht 61.5\" (156.2 cm)  Wt 141 lb (64 kg)  LMP 10/20/2017  SpO2 98%  BMI 26.21 kg/m2    Physical Exam   Constitutional: She is oriented to person, place, and time. She appears well-developed and well-nourished. No distress.   HENT:   Head: Normocephalic and atraumatic.   Right Ear: Ear canal normal. Tympanic membrane is scarred.   Left Ear: Ear canal normal. Tympanic membrane is scarred.   Nose: Mucosal edema ( pale, boggy) present. Right sinus exhibits no maxillary sinus tenderness and no frontal sinus tenderness. Left sinus exhibits no maxillary sinus tenderness and no frontal sinus tenderness.   Mouth/Throat: Uvula is midline and mucous membranes are normal. No oropharyngeal exudate, posterior oropharyngeal edema or posterior oropharyngeal erythema.   Clear PND    Hoarseness noted     Eyes: Conjunctivae are normal. Right eye exhibits no discharge. Left eye exhibits no discharge.   Cardiovascular: Normal rate and regular rhythm.    Pulmonary/Chest: Effort normal and breath sounds normal. She has no wheezes. She has no rales.   Frequent, hacky cough   Lymphadenopathy:     She has no cervical adenopathy.   Neurological: She " is alert and oriented to person, place, and time.   Nursing note and vitals reviewed.      Assessment/Plan   Sofiya was seen today for cough, sore throat, earache and hoarse.    Diagnoses and all orders for this visit:    Allergic rhinitis, unspecified chronicity, unspecified seasonality, unspecified trigger  -     triamcinolone acetonide (KENALOG-40) injection 40 mg; Inject 1 mL into the shoulder, thigh, or buttocks 1 (One) Time.    Laryngitis    Cough      Continue with humidifier, cough drops  Claritin OTC daily  Kenalog 40mg IM x 1 in office    Appears to be all allergy induced at this time.  Call or f/u if cough becomes productive of purulent mucus, wheezing develops or fever

## 2017-11-09 RX ORDER — IBUPROFEN 800 MG/1
TABLET ORAL
Qty: 90 TABLET | Refills: 0 | Status: SHIPPED | OUTPATIENT
Start: 2017-11-09 | End: 2018-12-28 | Stop reason: SDUPTHER

## 2018-02-27 DIAGNOSIS — R11.2 NAUSEA AND VOMITING, INTRACTABILITY OF VOMITING NOT SPECIFIED, UNSPECIFIED VOMITING TYPE: Primary | ICD-10-CM

## 2018-02-27 RX ORDER — ONDANSETRON 8 MG/1
8 TABLET, ORALLY DISINTEGRATING ORAL EVERY 8 HOURS PRN
Qty: 30 TABLET | Refills: 1 | Status: SHIPPED | OUTPATIENT
Start: 2018-02-27 | End: 2019-05-31

## 2018-04-12 ENCOUNTER — APPOINTMENT (OUTPATIENT)
Dept: LAB | Facility: HOSPITAL | Age: 28
End: 2018-04-12

## 2018-04-12 ENCOUNTER — PROCEDURE VISIT (OUTPATIENT)
Dept: OBSTETRICS AND GYNECOLOGY | Facility: CLINIC | Age: 28
End: 2018-04-12

## 2018-04-12 VITALS
BODY MASS INDEX: 28.51 KG/M2 | SYSTOLIC BLOOD PRESSURE: 100 MMHG | HEIGHT: 61 IN | WEIGHT: 151 LBS | DIASTOLIC BLOOD PRESSURE: 64 MMHG

## 2018-04-12 DIAGNOSIS — R30.0 DYSURIA: ICD-10-CM

## 2018-04-12 DIAGNOSIS — Z01.419 CERVICAL SMEAR, AS PART OF ROUTINE GYNECOLOGICAL EXAMINATION: Primary | ICD-10-CM

## 2018-04-12 LAB
BILIRUB UR QL STRIP: NEGATIVE
CLARITY UR: CLEAR
COLOR UR: YELLOW
GLUCOSE UR STRIP-MCNC: NEGATIVE MG/DL
HGB UR QL STRIP.AUTO: NEGATIVE
KETONES UR QL STRIP: NEGATIVE
LEUKOCYTE ESTERASE UR QL STRIP.AUTO: NEGATIVE
NITRITE UR QL STRIP: NEGATIVE
PH UR STRIP.AUTO: 7.5 [PH] (ref 5–9)
PROT UR QL STRIP: NEGATIVE
SP GR UR STRIP: 1.02 (ref 1–1.03)
UROBILINOGEN UR QL STRIP: NORMAL

## 2018-04-12 PROCEDURE — 99385 PREV VISIT NEW AGE 18-39: CPT | Performed by: OBSTETRICS & GYNECOLOGY

## 2018-04-12 PROCEDURE — G0123 SCREEN CERV/VAG THIN LAYER: HCPCS | Performed by: OBSTETRICS & GYNECOLOGY

## 2018-04-12 PROCEDURE — 87086 URINE CULTURE/COLONY COUNT: CPT | Performed by: OBSTETRICS & GYNECOLOGY

## 2018-04-12 PROCEDURE — 81003 URINALYSIS AUTO W/O SCOPE: CPT | Performed by: OBSTETRICS & GYNECOLOGY

## 2018-04-12 PROCEDURE — 87624 HPV HI-RISK TYP POOLED RSLT: CPT | Performed by: OBSTETRICS & GYNECOLOGY

## 2018-04-12 NOTE — PROGRESS NOTES
Subjective   Chief Complaint   Patient presents with   • Gynecologic Exam     Sofiya Almaguer is a 28 y.o. year old  presenting to be seen for her annual exam.  Today she Has no gynecological complaints    Patient's last menstrual period was 2018 (approximate).    Current birth control method: Nexplanon contraceptive implant.    Past Medical History:   Diagnosis Date   • Abnormal liver function    • Abnormal weight gain    • Acne    • Cervical intraepithelial neoplasia grade 2     h/o   • Contraception    • Hyperlipidemia    • Vitamin D deficiency      OB History      Para Term  AB Living    5 5 5 0 0 5    SAB TAB Ectopic Molar Multiple Live Births    0 0 0  0 5        Past Surgical History:   Procedure Laterality Date   • ADENOIDECTOMY     • CERVICAL BIOPSY  W/ LOOP ELECTRODE EXCISION     • INCISION AND DRAINAGE ABSCESS  2012    simple I&D   • LEEP  2007    LEEP cone biopsy. moderate cervical dysplasia   • OTHER SURGICAL HISTORY  2011    remove impacted cerumen   • TONSILLECTOMY       Family History   Problem Relation Age of Onset   • Bipolar disorder Mother    • Depression Mother      depressive disorder   • Diabetes Mother    • Hyperlipidemia Mother    • Hypertension Mother    • Breast cancer Other    • Colon cancer Other    • Diabetes Maternal Grandmother    • Hypertension Maternal Grandmother    • Colon cancer Maternal Grandfather        Current Outpatient Prescriptions:   •  clindamycin-benzoyl peroxide (BENZACLIN) 1-5 % gel, Apply  topically 2 (Two) Times a Day., Disp: 50 g, Rfl: 11  •  Etonogestrel (NEXPLANON) 68 MG implant subdermal implant, Inject 1 each into the skin 1 (One) Time., Disp: , Rfl:   •  ibuprofen (ADVIL,MOTRIN) 800 MG tablet, TAKE ONE TABLET BY MOUTH THREE TIMES DAILY WITH FOOD, Disp: 90 tablet, Rfl: 0  •  Multiple Vitamin (MULTI VITAMIN PO), Take  by mouth., Disp: , Rfl:   •  ondansetron ODT (ZOFRAN ODT) 8 MG disintegrating tablet, Take 1  "tablet by mouth Every 8 (Eight) Hours As Needed for Nausea or Vomiting., Disp: 30 tablet, Rfl: 1  Social History     Social History   • Marital status: Single     Spouse name: N/A   • Number of children: N/A   • Years of education: N/A     Occupational History   • Not on file.     Social History Main Topics   • Smoking status: Never Smoker   • Smokeless tobacco: Never Used   • Alcohol use No   • Drug use: No   • Sexual activity: Yes     Partners: Male     Birth control/ protection: Implant     Other Topics Concern   • Not on file     Social History Narrative   • No narrative on file     No Known Allergies      Smoking status: Never Smoker                                                              Smokeless tobacco: Never Used                        Review of Systems   Constitutional: Negative for activity change, appetite change, chills and fever.   Gastrointestinal: Negative for abdominal distention and abdominal pain.   Genitourinary: Positive for dysuria. Negative for difficulty urinating, flank pain, genital sores, pelvic pain, vaginal bleeding, vaginal discharge and vaginal pain.   All other systems reviewed and are negative.        Objective   /64   Ht 154.9 cm (61\")   Wt 68.5 kg (151 lb)   LMP 04/01/2018 (Approximate)   Breastfeeding? No   BMI 28.53 kg/m²     General:  well developed; well nourished  no acute distress   Thyroid: not examined   Heart:: Not performed.   Lungs: breathing is unlabored   Breasts:  Not performed.   Abdomen: soft, non-tender; no masses  no umbilical or inginual hernias are present  no hepato-splenomegaly   Pelvis: Clinical staff was present for exam  External genitalia:  normal appearance of the external genitalia including Bartholin's and Alex's glands.  :  urethral meatus normal;  Vaginal:  normal pink mucosa without prolapse or lesions  Cervix:  normal appearance.  Uterus:  normal size, shape and consistency  Adnexa:  normal bimanual exam of the adnexa.  Rectal:  " digital rectal exam not performed; anus visually normal appearing.     Lab Review   No data reviewed    Pap performed: Yes    Imaging  No data reviewed       Assessment      Diagnosis Plan   1. Cervical smear, as part of routine gynecological examination  Liquid-based Pap Smear, Screening   2. Dysuria  Urine Culture - Urine, Urine, Clean Catch    Urinalysis - Urine, Clean Catch          Plan   1. A Pap smear was performed the patient will be notified by mail of her Pap smear result  2. Because for dysuria we'll check a UA and C&S         Pradip Holm MD  April 12, 2018

## 2018-04-13 LAB — BACTERIA SPEC AEROBE CULT: NORMAL

## 2018-04-16 LAB
LAB AP CASE REPORT: NORMAL
LAB AP GYN ADDITIONAL INFORMATION: NORMAL
LAB AP GYN OTHER FINDINGS: NORMAL
Lab: NORMAL
PATH INTERP SPEC-IMP: NORMAL
STAT OF ADQ CVX/VAG CYTO-IMP: NORMAL

## 2018-04-17 LAB — HPV I/H RISK 4 DNA CVX QL PROBE+SIG AMP: NEGATIVE

## 2018-12-28 ENCOUNTER — OFFICE VISIT (OUTPATIENT)
Dept: FAMILY MEDICINE CLINIC | Facility: CLINIC | Age: 28
End: 2018-12-28

## 2018-12-28 VITALS
HEIGHT: 61 IN | RESPIRATION RATE: 20 BRPM | HEART RATE: 76 BPM | WEIGHT: 159.2 LBS | SYSTOLIC BLOOD PRESSURE: 120 MMHG | DIASTOLIC BLOOD PRESSURE: 70 MMHG | BODY MASS INDEX: 30.06 KG/M2 | OXYGEN SATURATION: 97 % | TEMPERATURE: 98.3 F

## 2018-12-28 DIAGNOSIS — L70.0 ACNE VULGARIS: ICD-10-CM

## 2018-12-28 DIAGNOSIS — Z13.29 SCREENING FOR THYROID DISORDER: ICD-10-CM

## 2018-12-28 DIAGNOSIS — Z13.220 SCREENING FOR LIPOID DISORDERS: ICD-10-CM

## 2018-12-28 DIAGNOSIS — J01.00 ACUTE NON-RECURRENT MAXILLARY SINUSITIS: ICD-10-CM

## 2018-12-28 DIAGNOSIS — G89.29 CHRONIC BILATERAL LOW BACK PAIN WITHOUT SCIATICA: Primary | ICD-10-CM

## 2018-12-28 DIAGNOSIS — Z13.1 SCREENING FOR DIABETES MELLITUS: ICD-10-CM

## 2018-12-28 DIAGNOSIS — M54.50 CHRONIC BILATERAL LOW BACK PAIN WITHOUT SCIATICA: Primary | ICD-10-CM

## 2018-12-28 PROCEDURE — 99214 OFFICE O/P EST MOD 30 MIN: CPT | Performed by: NURSE PRACTITIONER

## 2018-12-28 RX ORDER — AMOXICILLIN AND CLAVULANATE POTASSIUM 875; 125 MG/1; MG/1
1 TABLET, FILM COATED ORAL 2 TIMES DAILY
Qty: 20 TABLET | Refills: 0 | Status: SHIPPED | OUTPATIENT
Start: 2018-12-28 | End: 2019-01-07

## 2018-12-28 RX ORDER — CETIRIZINE HYDROCHLORIDE, PSEUDOEPHEDRINE HYDROCHLORIDE 5; 120 MG/1; MG/1
1 TABLET, FILM COATED, EXTENDED RELEASE ORAL 2 TIMES DAILY
Qty: 20 TABLET | Refills: 0 | Status: SHIPPED | OUTPATIENT
Start: 2018-12-28 | End: 2019-01-07

## 2018-12-28 RX ORDER — CLINDAMYCIN AND BENZOYL PEROXIDE 10; 50 MG/G; MG/G
GEL TOPICAL 2 TIMES DAILY
Qty: 50 G | Refills: 11 | Status: SHIPPED | OUTPATIENT
Start: 2018-12-28 | End: 2020-01-09 | Stop reason: SDUPTHER

## 2018-12-28 RX ORDER — IBUPROFEN 800 MG/1
800 TABLET ORAL EVERY 8 HOURS PRN
Qty: 90 TABLET | Refills: 2 | Status: SHIPPED | OUTPATIENT
Start: 2018-12-28 | End: 2019-07-19 | Stop reason: SDUPTHER

## 2018-12-28 RX ORDER — HEPATITIS A VACCINE 1440 [IU]/ML
INJECTION, SUSPENSION INTRAMUSCULAR
COMMUNITY
Start: 2018-11-06 | End: 2018-12-28

## 2018-12-28 RX ORDER — FLUCONAZOLE 200 MG/1
200 TABLET ORAL EVERY OTHER DAY
Qty: 3 TABLET | Refills: 0 | Status: SHIPPED | OUTPATIENT
Start: 2018-12-28 | End: 2019-01-02

## 2019-01-02 ENCOUNTER — APPOINTMENT (OUTPATIENT)
Dept: LAB | Facility: HOSPITAL | Age: 29
End: 2019-01-02

## 2019-01-02 LAB
25(OH)D3 SERPL-MCNC: 29.6 NG/ML (ref 30–100)
ALBUMIN SERPL-MCNC: 4.1 G/DL (ref 3.4–4.8)
ALBUMIN/GLOB SERPL: 1.4 G/DL (ref 1.1–1.8)
ALP SERPL-CCNC: 77 U/L (ref 38–126)
ALT SERPL W P-5'-P-CCNC: 89 U/L (ref 9–52)
ANION GAP SERPL CALCULATED.3IONS-SCNC: 11 MMOL/L (ref 5–15)
ARTICHOKE IGE QN: 132 MG/DL (ref 1–129)
AST SERPL-CCNC: 54 U/L (ref 14–36)
BILIRUB SERPL-MCNC: 0.4 MG/DL (ref 0.2–1.3)
BUN BLD-MCNC: 13 MG/DL (ref 7–21)
BUN/CREAT SERPL: 19.1 (ref 7–25)
CALCIUM SPEC-SCNC: 9.3 MG/DL (ref 8.4–10.2)
CHLORIDE SERPL-SCNC: 104 MMOL/L (ref 95–110)
CHOLEST SERPL-MCNC: 194 MG/DL (ref 0–199)
CO2 SERPL-SCNC: 24 MMOL/L (ref 22–31)
CREAT BLD-MCNC: 0.68 MG/DL (ref 0.5–1)
DEPRECATED RDW RBC AUTO: 37.7 FL (ref 36.4–46.3)
ERYTHROCYTE [DISTWIDTH] IN BLOOD BY AUTOMATED COUNT: 12.2 % (ref 11.5–14.5)
GFR SERPL CREATININE-BSD FRML MDRD: 102 ML/MIN/1.73 (ref 64–149)
GLOBULIN UR ELPH-MCNC: 2.9 GM/DL (ref 2.3–3.5)
GLUCOSE BLD-MCNC: 100 MG/DL (ref 60–100)
HBA1C MFR BLD: 4.9 % (ref 4–5.6)
HCT VFR BLD AUTO: 42.7 % (ref 35–45)
HDLC SERPL-MCNC: 46 MG/DL (ref 60–200)
HGB BLD-MCNC: 14.7 G/DL (ref 12–15.5)
LDLC/HDLC SERPL: 3 {RATIO} (ref 0–3.22)
MCH RBC QN AUTO: 29.2 PG (ref 26.5–34)
MCHC RBC AUTO-ENTMCNC: 34.4 G/DL (ref 31.4–36)
MCV RBC AUTO: 84.7 FL (ref 80–98)
PLATELET # BLD AUTO: 280 10*3/MM3 (ref 150–450)
PMV BLD AUTO: 9.8 FL (ref 8–12)
POTASSIUM BLD-SCNC: 4.3 MMOL/L (ref 3.5–5.1)
PROT SERPL-MCNC: 7 G/DL (ref 6.3–8.6)
RBC # BLD AUTO: 5.04 10*6/MM3 (ref 3.77–5.16)
SODIUM BLD-SCNC: 139 MMOL/L (ref 137–145)
TRIGL SERPL-MCNC: 50 MG/DL (ref 20–199)
TSH SERPL DL<=0.05 MIU/L-ACNC: 2.11 MIU/ML (ref 0.46–4.68)
WBC NRBC COR # BLD: 5.05 10*3/MM3 (ref 3.2–9.8)

## 2019-01-02 PROCEDURE — 83036 HEMOGLOBIN GLYCOSYLATED A1C: CPT | Performed by: NURSE PRACTITIONER

## 2019-01-02 PROCEDURE — 82306 VITAMIN D 25 HYDROXY: CPT | Performed by: NURSE PRACTITIONER

## 2019-01-02 PROCEDURE — 85027 COMPLETE CBC AUTOMATED: CPT | Performed by: NURSE PRACTITIONER

## 2019-01-02 PROCEDURE — 80053 COMPREHEN METABOLIC PANEL: CPT | Performed by: NURSE PRACTITIONER

## 2019-01-02 PROCEDURE — 80061 LIPID PANEL: CPT | Performed by: NURSE PRACTITIONER

## 2019-01-02 PROCEDURE — 84443 ASSAY THYROID STIM HORMONE: CPT | Performed by: NURSE PRACTITIONER

## 2019-01-08 NOTE — PROGRESS NOTES
Her LDL continues to be elevated at 132 and her liver function is also elevated.  Had she been drinking alcohol?  I would recommend an US of her liver to evaluate her liver.  She has had elevated liver function in the past (4 years ago) which concerns me.

## 2019-01-10 ENCOUNTER — TELEPHONE (OUTPATIENT)
Dept: FAMILY MEDICINE CLINIC | Facility: CLINIC | Age: 29
End: 2019-01-10

## 2019-01-10 DIAGNOSIS — R94.5 ABNORMAL LIVER FUNCTION: Primary | ICD-10-CM

## 2019-01-10 NOTE — TELEPHONE ENCOUNTER
----- Message from LEEANN Chatman sent at 1/8/2019 12:15 PM CST -----  Her LDL continues to be elevated at 132 and her liver function is also elevated.  Had she been drinking alcohol?  I would recommend an US of her liver to evaluate her liver.  She has had elevated liver function in the past (4 years ago) which concern  s me.

## 2019-01-10 NOTE — TELEPHONE ENCOUNTER
----- Message from LEEANN Chatman sent at 1/8/2019 12:15 PM CST -----  Her LDL continues to be elevated at 132 and her liver function is also elevated.  Had she been drinking alcohol?  I would recommend an US of her liver to evaluate her liver.  She has had elevated liver function in the past (4 years ago) which concern_  s me.      Pt was informed of this result and is willing to have the Liver U/S .

## 2019-01-14 NOTE — PROGRESS NOTES
Subjective   Sofiya Almaguer is a 28 y.o. female.     She presents today for her annual wellness physical.  She is due for fasting labs.  She also needs a refill on her acne medication today in the office.  She has complaints of cough, congestion, sore throat, and URI symptoms that started over the last week.  She denies any fever.  She is otherwise without any new complaints today in the office.      URI    This is a new problem. The current episode started in the past 7 days. The problem has been waxing and waning. There has been no fever. Associated symptoms include congestion, coughing, ear pain, headaches, a rash, rhinorrhea, sinus pain, sneezing and a sore throat. Pertinent negatives include no abdominal pain, chest pain, diarrhea, dysuria, joint pain, joint swelling, nausea, neck pain, plugged ear sensation, swollen glands, vomiting or wheezing. The treatment provided no relief.   Rash   This is a chronic problem. The current episode started more than 1 year ago. The problem has been waxing and waning since onset. The affected locations include the face. The rash is characterized by redness. Associated symptoms include congestion, coughing, rhinorrhea and a sore throat. Pertinent negatives include no anorexia, diarrhea, eye pain, facial edema, fatigue, fever, joint pain, nail changes, shortness of breath or vomiting. The treatment provided no relief.        The following portions of the patient's history were reviewed and updated as appropriate: allergies, current medications, past family history, past medical history, past social history, past surgical history and problem list.    Review of Systems   Constitutional: Negative.  Negative for fatigue and fever.   HENT: Positive for congestion, ear pain, postnasal drip, rhinorrhea, sinus pressure, sneezing and sore throat.    Eyes: Negative.  Negative for pain.   Respiratory: Positive for cough. Negative for shortness of breath and wheezing.     Cardiovascular: Negative.  Negative for chest pain.   Gastrointestinal: Negative.  Negative for abdominal pain, anorexia, diarrhea, nausea and vomiting.   Genitourinary: Positive for hematuria. Negative for dysuria.   Musculoskeletal: Negative.  Negative for joint pain and neck pain.   Skin: Positive for rash. Negative for nail changes.   Allergic/Immunologic: Negative.    Hematological: Negative.    Psychiatric/Behavioral: Negative.        Objective   Physical Exam   Constitutional: She is oriented to person, place, and time. Vital signs are normal. She appears well-developed and well-nourished. No distress. She is obese.  HENT:   Head: Normocephalic.   Right Ear: External ear normal. A middle ear effusion is present.   Left Ear: External ear normal. A middle ear effusion is present.   Nose: Mucosal edema and rhinorrhea present. Right sinus exhibits maxillary sinus tenderness and frontal sinus tenderness. Left sinus exhibits maxillary sinus tenderness and frontal sinus tenderness.   Mouth/Throat: Posterior oropharyngeal edema and posterior oropharyngeal erythema present. No oropharyngeal exudate.   Eyes: Conjunctivae and EOM are normal. Pupils are equal, round, and reactive to light. Right eye exhibits no discharge. Left eye exhibits no discharge.   Neck: Normal range of motion. Neck supple. No tracheal deviation present. No thyromegaly present.   Cardiovascular: Normal rate, regular rhythm and normal heart sounds. Exam reveals no gallop and no friction rub.   No murmur heard.  Pulmonary/Chest: Effort normal and breath sounds normal. No respiratory distress. She has no wheezes. She has no rales. She exhibits no tenderness.   Musculoskeletal: Normal range of motion.   Lymphadenopathy:     She has no cervical adenopathy.   Neurological: She is alert and oriented to person, place, and time.   Skin: Skin is warm and dry. Capillary refill takes less than 2 seconds. No rash noted. She is not diaphoretic. No erythema.  No pallor.   Psychiatric: She has a normal mood and affect. Her behavior is normal. Judgment and thought content normal.   Nursing note and vitals reviewed.        Assessment/Plan   Sofiya was seen today for follow-up and annual exam.    Diagnoses and all orders for this visit:    Chronic bilateral low back pain without sciatica  -     ibuprofen (ADVIL,MOTRIN) 800 MG tablet; Take 1 tablet by mouth Every 8 (Eight) Hours As Needed for Mild Pain .  -     CBC (No Diff)  -     Comprehensive Metabolic Panel  -     Vitamin D 25 Hydroxy    Acne vulgaris  -     clindamycin-benzoyl peroxide (BENZACLIN) 1-5 % gel; Apply  topically to the appropriate area as directed 2 (Two) Times a Day.    Acute non-recurrent maxillary sinusitis  -     amoxicillin-clavulanate (AUGMENTIN) 875-125 MG per tablet; Take 1 tablet by mouth 2 (Two) Times a Day for 10 days.  -     cetirizine-pseudoephedrine (ZyrTEC-D) 5-120 MG per 12 hr tablet; Take 1 tablet by mouth 2 (Two) Times a Day for 10 days.  -     fluconazole (DIFLUCAN) 200 MG tablet; Take 1 tablet by mouth Every Other Day for 3 doses.    Screening for diabetes mellitus  -     Hemoglobin A1c    Screening for lipoid disorders  -     Lipid Panel    Screening for thyroid disorder  -     TSH    Patient's Body mass index is 30.08 kg/m². BMI is above normal parameters. Recommendations include: educational material.  Fasting labs.  Benzaclin topically to the affected area BID.  Plenty of fluids.  Finish all antibiotics.  Zyrtec D BID for congestion symptoms.  Diflucan PRN yeast symptoms.  Continue all other current medications.  Follow up in 6 months for routine follow up.  Follow up sooner for problems/concerns.  Patient verbalized understanding and agreement with plan of care.        This document has been electronically signed by LEEANN Chatman on January 13, 2019 7:53 PM

## 2019-01-18 ENCOUNTER — TELEPHONE (OUTPATIENT)
Dept: FAMILY MEDICINE CLINIC | Facility: CLINIC | Age: 29
End: 2019-01-18

## 2019-01-18 NOTE — TELEPHONE ENCOUNTER
Normal US of the right upper quadrant.  Her liver appears normal.        This document has been electronically signed by LEEANN Chatman on January 18, 2019 11:14 AM

## 2019-01-18 NOTE — TELEPHONE ENCOUNTER
Pt was informed of this [id number]    Pt was informed of this result.

## 2019-01-21 DIAGNOSIS — R94.5 ABNORMAL LIVER FUNCTION: ICD-10-CM

## 2019-02-05 DIAGNOSIS — T78.40XA ALLERGIC REACTION, INITIAL ENCOUNTER: Primary | ICD-10-CM

## 2019-02-05 RX ORDER — PREDNISONE 20 MG/1
20 TABLET ORAL DAILY
Qty: 7 TABLET | Refills: 0 | Status: SHIPPED | OUTPATIENT
Start: 2019-02-05 | End: 2019-02-12

## 2019-04-05 ENCOUNTER — PATIENT MESSAGE (OUTPATIENT)
Dept: FAMILY MEDICINE CLINIC | Facility: CLINIC | Age: 29
End: 2019-04-05

## 2019-04-05 DIAGNOSIS — N39.0 ACUTE UTI: Primary | ICD-10-CM

## 2019-04-09 RX ORDER — SULFAMETHOXAZOLE AND TRIMETHOPRIM 800; 160 MG/1; MG/1
1 TABLET ORAL 2 TIMES DAILY
Qty: 14 TABLET | Refills: 0 | Status: SHIPPED | OUTPATIENT
Start: 2019-04-09 | End: 2019-04-16

## 2019-04-10 NOTE — TELEPHONE ENCOUNTER
From: Sofiya Almaguer  To: Eva Jon APRN  Sent: 4/5/2019 4:10 PM CDT  Subject: Non-Urgent Medical Question    Hope all is well. sorry to bother you but you said to email you if i needed anything and im in need of antibiotics. i have a uti and its been 7 days already and no relief. most of mine are self limiting but this one is not. if you dont mind, can i have some bactrim. if i need to be seen, im off on tues

## 2019-05-31 ENCOUNTER — OFFICE VISIT (OUTPATIENT)
Dept: FAMILY MEDICINE CLINIC | Facility: CLINIC | Age: 29
End: 2019-05-31

## 2019-05-31 VITALS
SYSTOLIC BLOOD PRESSURE: 108 MMHG | HEART RATE: 80 BPM | HEIGHT: 62 IN | TEMPERATURE: 98.4 F | OXYGEN SATURATION: 99 % | DIASTOLIC BLOOD PRESSURE: 70 MMHG | BODY MASS INDEX: 28.71 KG/M2 | WEIGHT: 156 LBS

## 2019-05-31 DIAGNOSIS — J40 BRONCHITIS: Primary | ICD-10-CM

## 2019-05-31 DIAGNOSIS — J01.90 ACUTE SINUSITIS, RECURRENCE NOT SPECIFIED, UNSPECIFIED LOCATION: ICD-10-CM

## 2019-05-31 PROCEDURE — 99214 OFFICE O/P EST MOD 30 MIN: CPT | Performed by: NURSE PRACTITIONER

## 2019-05-31 PROCEDURE — 96372 THER/PROPH/DIAG INJ SC/IM: CPT | Performed by: NURSE PRACTITIONER

## 2019-05-31 RX ORDER — FLUCONAZOLE 150 MG/1
TABLET ORAL
Qty: 2 TABLET | Refills: 0 | Status: SHIPPED | OUTPATIENT
Start: 2019-05-31 | End: 2019-06-24

## 2019-05-31 RX ORDER — AMOXICILLIN AND CLAVULANATE POTASSIUM 875; 125 MG/1; MG/1
1 TABLET, FILM COATED ORAL EVERY 12 HOURS SCHEDULED
Qty: 20 TABLET | Refills: 0 | Status: SHIPPED | OUTPATIENT
Start: 2019-05-31 | End: 2019-06-10

## 2019-05-31 RX ORDER — TRIAMCINOLONE ACETONIDE 40 MG/ML
60 INJECTION, SUSPENSION INTRA-ARTICULAR; INTRAMUSCULAR ONCE
Status: COMPLETED | OUTPATIENT
Start: 2019-05-31 | End: 2019-05-31

## 2019-05-31 RX ORDER — HEPATITIS B VACCINE (RECOMBINANT) ADJUVANTED 20 UG/.5ML
INJECTION, SOLUTION INTRAMUSCULAR
Refills: 0 | COMMUNITY
Start: 2019-05-27 | End: 2019-05-31

## 2019-05-31 RX ADMIN — TRIAMCINOLONE ACETONIDE 60 MG: 40 INJECTION, SUSPENSION INTRA-ARTICULAR; INTRAMUSCULAR at 11:15

## 2019-05-31 NOTE — PROGRESS NOTES
"Subjective   Sofiya Almaguer is a 29 y.o. female.     [facility] Visit    PCP: LEEANN Bennett    CC:  \"ear pain, drainage, cough, congestion x 3+ weeks\"      Sinusitis   This is a new problem. The current episode started 1 to 4 weeks ago. Progression since onset: reports sinuses feel some better than they did initially. Maximum temperature: maybe at onset only, none since. Her pain is at a severity of 5/10. Associated symptoms include congestion, coughing, ear pain (L>R), headaches, a hoarse voice, sinus pressure ( maxillary) and a sore throat ( scratchy). Pertinent negatives include no chills, diaphoresis, neck pain, shortness of breath, sneezing or swollen glands. Treatments tried: dayquil. The treatment provided mild relief.   Cough   This is a new problem. The current episode started 1 to 4 weeks ago. The problem has been unchanged. The cough is productive of sputum. Associated symptoms include ear congestion, ear pain (L>R), a fever ( at onset only), headaches, nasal congestion, postnasal drip, rhinorrhea (green), a sore throat ( scratchy) and wheezing ( maybe occasional). Pertinent negatives include no chest pain, chills, heartburn, hemoptysis, myalgias, rash, shortness of breath, sweats or weight loss. The symptoms are aggravated by lying down. Treatments tried: dayquil. The treatment provided mild relief.        The following portions of the patient's history were reviewed and updated as appropriate: allergies, current medications, past medical history, past social history, past surgical history and problem list.    Review of Systems   Constitutional: Positive for fatigue ( mild) and fever ( at onset only). Negative for appetite change, chills, diaphoresis and unexpected weight loss.   HENT: Positive for congestion, ear pain (L>R), hoarse voice, postnasal drip, rhinorrhea (green), sinus pressure ( maxillary) and sore throat ( scratchy). Negative for ear discharge and sneezing.    Eyes: Negative " "for discharge and itching.   Respiratory: Positive for cough, chest tightness and wheezing ( maybe occasional). Negative for hemoptysis and shortness of breath.    Cardiovascular: Negative for chest pain and leg swelling.   Gastrointestinal: Negative for diarrhea, nausea and vomiting.   Genitourinary: Negative for difficulty urinating.   Musculoskeletal: Negative for myalgias and neck pain.   Skin: Negative for rash.   Neurological: Positive for headache. Negative for dizziness.     /70 (BP Location: Left arm, Patient Position: Sitting, Cuff Size: Adult)   Pulse 80   Temp 98.4 °F (36.9 °C) (Tympanic)   Ht 156.2 cm (61.5\")   Wt 70.8 kg (156 lb)   SpO2 99%   BMI 29.00 kg/m²     Objective   Physical Exam   Constitutional: She is oriented to person, place, and time. She appears well-developed and well-nourished. No distress.   HENT:   Head: Normocephalic and atraumatic.   Right Ear: Tympanic membrane is scarred. Tympanic membrane is not erythematous. A middle ear effusion is present.   Left Ear: Tympanic membrane is scarred. Tympanic membrane is not erythematous. A middle ear effusion is present.   Nose: Mucosal edema and congestion present. Right sinus exhibits maxillary sinus tenderness. Right sinus exhibits no frontal sinus tenderness. Left sinus exhibits maxillary sinus tenderness. Left sinus exhibits no frontal sinus tenderness.   Mouth/Throat: Uvula is midline and mucous membranes are normal. Posterior oropharyngeal erythema (with moderate PND) present.   Eyes: Conjunctivae are normal. Right eye exhibits no discharge. Left eye exhibits no discharge.   Neck: Neck supple.   Cardiovascular: Normal rate and regular rhythm.   Pulmonary/Chest: Effort normal. She has decreased breath sounds. She has no wheezes. She has no rhonchi. She has no rales.   Congested, wet cough noted.  Declines neb treatment in office   Lymphadenopathy:     She has no cervical adenopathy.   Neurological: She is alert and oriented to " person, place, and time.   Nursing note and vitals reviewed.    No results found for this or any previous visit (from the past 24 hour(s)).  No Images in the past 120 days found..      Assessment/Plan   Sofiya was seen today for earache, cough and uri.    Diagnoses and all orders for this visit:    Bronchitis  -     amoxicillin-clavulanate (AUGMENTIN) 875-125 MG per tablet; Take 1 tablet by mouth Every 12 (Twelve) Hours for 10 days.  -     triamcinolone acetonide (KENALOG-40) injection 60 mg    Acute sinusitis, recurrence not specified, unspecified location  -     amoxicillin-clavulanate (AUGMENTIN) 875-125 MG per tablet; Take 1 tablet by mouth Every 12 (Twelve) Hours for 10 days.  -     triamcinolone acetonide (KENALOG-40) injection 60 mg    Other orders  -     fluconazole (DIFLUCAN) 150 MG tablet; 1 tab po x 1 now, may repeat in 4 days prn yeast    Push fluids  Rest  Declines albuterol, cough med, nasal spray or congestion meds  May continue with Dayquil as needed  Declines Rocephin injection in office. Rx for Augmentin provided  Kenalog 60 mg IM x 1 in office  Declines CXR at this time--will let me know if cough not improving in 48-72 hours with above treatment and if not, will RTC for CXR only    See PCP or RTC if symptoms persist/worsen  See PCP for routine f/u visit and management of chronic medical conditions

## 2019-05-31 NOTE — PATIENT INSTRUCTIONS
Acute Bronchitis, Adult  Acute bronchitis is when air tubes (bronchi) in the lungs suddenly get swollen. The condition can make it hard to breathe. It can also cause these symptoms:  · A cough.  · Coughing up clear, yellow, or green mucus.  · Wheezing.  · Chest congestion.  · Shortness of breath.  · A fever.  · Body aches.  · Chills.  · A sore throat.    Follow these instructions at home:  Medicines  · Take over-the-counter and prescription medicines only as told by your doctor.  · If you were prescribed an antibiotic medicine, take it as told by your doctor. Do not stop taking the antibiotic even if you start to feel better.  General instructions    · Rest.  · Drink enough fluids to keep your pee (urine) pale yellow.  · Avoid smoking and secondhand smoke. If you smoke and you need help quitting, ask your doctor. Quitting will help your lungs heal faster.  · Use an inhaler, cool mist vaporizer, or humidifier as told by your doctor.  · Keep all follow-up visits as told by your doctor. This is important.  How is this prevented?  To lower your risk of getting this condition again:  · Wash your hands often with soap and water. If you cannot use soap and water, use hand .  · Avoid contact with people who have cold symptoms.  · Try not to touch your hands to your mouth, nose, or eyes.  · Make sure to get the flu shot every year.    Contact a doctor if:  · Your symptoms do not get better in 2 weeks.  Get help right away if:  · You cough up blood.  · You have chest pain.  · You have very bad shortness of breath.  · You become dehydrated.  · You faint (pass out) or keep feeling like you are going to pass out.  · You keep throwing up (vomiting).  · You have a very bad headache.  · Your fever or chills gets worse.  This information is not intended to replace advice given to you by your health care provider. Make sure you discuss any questions you have with your health care provider.  Document Released: 06/05/2009  Document Revised: 08/01/2018 Document Reviewed: 06/07/2017  [employer] Interactive Patient Education © 2019 [employer]      Sinusitis, Adult  Sinusitis is soreness and inflammation of your sinuses. Sinuses are hollow spaces in the bones around your face. Your sinuses are located:  · Around your eyes.  · In the middle of your forehead.  · Behind your nose.  · In your cheekbones.    Your sinuses and nasal passages are lined with a stringy fluid (mucus). Mucus normally drains out of your sinuses. When your nasal tissues become inflamed or swollen, mucus can become trapped or blocked. Blocked or trapped mucus makes it difficult for air to flow through your sinuses. This allows bacteria, viruses, and funguses to grow, which leads to infection. Most infections of the sinuses are caused by a virus.  Sinusitis can develop quickly. It can last for 7?10 days (acute) or for more than 12 weeks (chronic). Sinusitis often develops after a cold.  What are the causes?  This condition is caused by anything that creates swelling in the sinuses or stops mucus from draining, including:  · Allergies.  · Asthma.  · Bacterial or viral infection.  · Abnormally shaped bones between the nasal passages.  · Nasal growths that contain mucus (nasal polyps).  · Narrow sinus openings.  · Pollutants, such as chemicals or irritants in the air.  · A foreign object stuck in the nose.  · A fungal infection. This is rare.    What increases the risk?  The following factors may make you more likely to develop this condition:  · Having allergies or asthma.  · Having had a recent cold or respiratory tract infection.  · Having structural deformities or blockages in your nose or sinuses.  · Having a weak immune system.  · Doing a lot of swimming or diving.  · Overusing nasal sprays.  · Smoking.    What are the signs or symptoms?  The main symptoms of this condition are pain and a feeling of pressure around the affected sinuses. Other symptoms  include:  · Upper toothache.  · Earache.  · Headache.  · Bad breath.  · Decreased sense of smell and taste.  · A cough that may get worse at night.  · Fatigue.  · Fever.  · Thick drainage from your nose. The drainage is often green and it may contain pus (purulent).  · Stuffy nose or congestion.  · Postnasal drip. This is when extra mucus collects in the throat or back of the nose.  · Swelling and warmth over the affected sinuses.  · Sore throat.  · Sensitivity to light.    How is this diagnosed?  This condition is diagnosed based on symptoms, a medical history, and a physical exam. To find out if your condition is acute or chronic, your health care provider may:  · Look in your nose for signs of nasal polyps.  · Tap over the affected sinus to check for signs of infection.  · View the inside of your sinuses using an imaging device that has a light attached (endoscope).    If your health care provider suspects that you have chronic sinusitis, you may also:  · Be tested for allergies.  · Have a sample of mucus taken from your nose (nasal culture) and checked for bacteria.  · Have a mucus sample examined to see if your sinusitis is related to an allergy.    If your sinusitis does not respond to treatment and it lasts longer than 8 weeks, you may have an MRI or CT scan to check your sinuses. These scans also help to determine how severe your infection is.  In rare cases, a bone biopsy may be done to rule out more serious types of fungal sinus disease.  How is this treated?  Treatment for sinusitis depends on the cause and whether your condition is chronic or acute. If a virus is causing your sinusitis, your symptoms will go away on their own within 10 days. You may be given medicines to relieve your symptoms, including:  · Topical nasal decongestants. They shrink swollen nasal passages and let mucus drain from your sinuses.  · Antihistamines. These drugs block inflammation that is triggered by allergies. This can help  to ease swelling in your nose and sinuses.  · Topical nasal corticosteroids. These are nasal sprays that ease inflammation and swelling in your nose and sinuses.  · Nasal saline washes. These rinses can help to get rid of thick mucus in your nose.    If your condition is caused by bacteria, your health care provider may recommend waiting to see if your symptoms improve. Most bacterial infections will get better without antibiotic medicine. If you have a severe infection or a weak immune system, you may be prescribed antibiotics.  Surgery may be needed to correct underlying conditions, such as narrow nasal passages. Surgery may also be needed to remove polyps.  Follow these instructions at home:  Medicines  · Take, use, or apply over-the-counter and prescription medicines only as told by your health care provider. These may include nasal sprays.  · If you were prescribed an antibiotic, take it as told by your health care provider. Do not stop taking the antibiotic even if you start to feel better.  Hydrate and Humidify  · Drink enough water to keep your urine clear or pale yellow. Staying hydrated will help to thin your mucus.  · Use a cool mist humidifier to keep the humidity level in your home above 50%.  · Inhale steam for 10-15 minutes, 3-4 times a day or as told by your health care provider. You can do this in the bathroom while a hot shower is running.  · Limit your exposure to cool or dry air.  Rest  · Rest as much as possible.  · Sleep with your head raised (elevated).  · Make sure to get enough sleep each night.  General instructions  · Apply a warm, moist washcloth to your face 3-4 times a day or as told by your health care provider. This will help with discomfort.  · Wash your hands often with soap and water to reduce your exposure to viruses and other germs. If soap and water are not available, use hand .  · Do not smoke. Avoid being around people who are smoking (secondhand smoke).  · Keep all  follow-up visits as told by your health care provider. This is important.  Contact a health care provider if:  · You have a fever.  · Your symptoms get worse.  · Your symptoms do not improve within 10 days.  Get help right away if:  · You have a severe headache.  · You have persistent vomiting.  · You have pain or swelling around your face or eyes.  · You have vision problems.  · You develop confusion.  · Your neck is stiff.  · You have trouble breathing.  This information is not intended to replace advice given to you by your health care provider. Make sure you discuss any questions you have with your health care provider.  Document Released: 12/18/2006 Document Revised: 06/28/2018 Document Reviewed: 10/12/2016  [employer] Interactive Patient Education © 2019 [employer]

## 2019-06-03 DIAGNOSIS — R05.9 COUGH: Primary | ICD-10-CM

## 2019-06-24 ENCOUNTER — OFFICE VISIT (OUTPATIENT)
Dept: FAMILY MEDICINE CLINIC | Facility: CLINIC | Age: 29
End: 2019-06-24

## 2019-06-24 VITALS
TEMPERATURE: 97.9 F | HEART RATE: 68 BPM | OXYGEN SATURATION: 98 % | BODY MASS INDEX: 29.26 KG/M2 | WEIGHT: 159 LBS | HEIGHT: 62 IN | SYSTOLIC BLOOD PRESSURE: 102 MMHG | DIASTOLIC BLOOD PRESSURE: 68 MMHG

## 2019-06-24 DIAGNOSIS — H65.93 BILATERAL SEROUS OTITIS MEDIA, UNSPECIFIED CHRONICITY: ICD-10-CM

## 2019-06-24 DIAGNOSIS — J01.90 ACUTE SINUSITIS, RECURRENCE NOT SPECIFIED, UNSPECIFIED LOCATION: Primary | ICD-10-CM

## 2019-06-24 PROCEDURE — 99214 OFFICE O/P EST MOD 30 MIN: CPT | Performed by: NURSE PRACTITIONER

## 2019-06-24 RX ORDER — AZITHROMYCIN 250 MG/1
TABLET, FILM COATED ORAL
Qty: 6 TABLET | Refills: 0 | Status: SHIPPED | OUTPATIENT
Start: 2019-06-24 | End: 2020-01-09

## 2019-06-24 RX ORDER — FLUTICASONE PROPIONATE 50 MCG
2 SPRAY, SUSPENSION (ML) NASAL DAILY
Qty: 16 G | Refills: 2 | Status: SHIPPED | OUTPATIENT
Start: 2019-06-24 | End: 2020-01-09

## 2019-06-24 RX ORDER — PSEUDOEPHEDRINE HCL 120 MG/1
120 TABLET, FILM COATED, EXTENDED RELEASE ORAL EVERY 12 HOURS
Qty: 20 TABLET | Refills: 0 | Status: SHIPPED | OUTPATIENT
Start: 2019-06-24 | End: 2020-01-09

## 2019-06-24 NOTE — PROGRESS NOTES
"Subjective   Sofiya Almaguer is a 29 y.o. female.     [facility] Visit    PCP: LEEANN Bennett    CC: \"sinus congestion, hearing loss, follow up\"    Seen 5-31 by me for sinusitis, bronchitis.  Cough has improved.  Still having thick, green mucus from nose and now right ear is completely clogged, left ear is still popping some.  No fever.        Sinusitis   Chronicity: persistent. The current episode started more than 1 month ago. The problem is unchanged. There has been no fever. She is experiencing no pain (pressure only). Associated symptoms include congestion, coughing ( still occasional, but much improved from previous visit) and sinus pressure. Pertinent negatives include no chills, diaphoresis, ear pain ( no pain, just pressure), headaches, hoarse voice, neck pain, shortness of breath, sneezing, sore throat or swollen glands. Treatments tried: steroid injection, Augmentin, dayquil. The treatment provided mild relief.        The following portions of the patient's history were reviewed and updated as appropriate: allergies, current medications, past medical history, past social history, past surgical history and problem list.    Review of Systems   Constitutional: Negative for appetite change, chills, diaphoresis and fever.   HENT: Positive for congestion, rhinorrhea (thick, green) and sinus pressure. Negative for ear discharge, ear pain ( no pain, just pressure), hoarse voice, sneezing and sore throat.    Eyes: Negative for discharge and itching.   Respiratory: Positive for cough ( still occasional, but much improved from previous visit). Negative for chest tightness, shortness of breath and wheezing.    Cardiovascular: Negative for chest pain and leg swelling.   Gastrointestinal: Negative for diarrhea, nausea and vomiting.   Musculoskeletal: Negative for neck pain.   Neurological: Positive for dizziness.   Hematological: Negative for adenopathy.     /68 (BP Location: Right arm, Patient " "Position: Sitting, Cuff Size: Adult)   Pulse 68   Temp 97.9 °F (36.6 °C) (Tympanic)   Ht 156.2 cm (61.5\")   Wt 72.1 kg (159 lb)   SpO2 98%   BMI 29.56 kg/m²     Objective   Physical Exam   Constitutional: She is oriented to person, place, and time. She appears well-developed and well-nourished. No distress.   HENT:   Head: Normocephalic and atraumatic.   Right Ear: Tympanic membrane is perforated (old, healed). Tympanic membrane is not erythematous. A middle ear effusion is present.   Left Ear: Tympanic membrane is perforated (old, healed). Tympanic membrane is not erythematous. A middle ear effusion is present.   Nose: Mucosal edema and congestion present. Right sinus exhibits maxillary sinus tenderness and frontal sinus tenderness. Left sinus exhibits maxillary sinus tenderness and frontal sinus tenderness.   Mouth/Throat: Uvula is midline, oropharynx is clear and moist and mucous membranes are normal.   Slight hoarseness noted   Eyes: Conjunctivae are normal. Right eye exhibits no discharge. Left eye exhibits no discharge.   Neck: Neck supple.   Cardiovascular: Normal rate and regular rhythm.   Pulmonary/Chest: Effort normal and breath sounds normal. She has no wheezes. She has no rales.   +loose cough   Lymphadenopathy:     She has no cervical adenopathy.   Neurological: She is alert and oriented to person, place, and time.   Nursing note and vitals reviewed.    No results found for this or any previous visit (from the past 24 hour(s)).  Xr Chest Pa & Lateral (in Office)    Result Date: 6/5/2019  No acute cardiopulmonary process identified. Electronically signed by:  Analy Sommers MD  6/5/2019 4:17 PM CDT Workstation: [id number]        Assessment/Plan   Sofiya was seen today for sinusitis, hearing problem and ear fullness.    Diagnoses and all orders for this visit:    Acute sinusitis, recurrence not specified, unspecified location  -     azithromycin (ZITHROMAX Z-BONY) 250 MG tablet; Take 2 tablets the first " day, then 1 tablet daily for 4 days.  -     fluticasone (FLONASE) 50 MCG/ACT nasal spray; 2 sprays into the nostril(s) as directed by provider Daily.  -     pseudoephedrine (SUDAFED 12 HOUR) 120 MG 12 hr tablet; Take 1 tablet by mouth Every 12 (Twelve) Hours.    Bilateral serous otitis media, unspecified chronicity  -     azithromycin (ZITHROMAX Z-BONY) 250 MG tablet; Take 2 tablets the first day, then 1 tablet daily for 4 days.  -     fluticasone (FLONASE) 50 MCG/ACT nasal spray; 2 sprays into the nostril(s) as directed by provider Daily.    Push fluids  Rest  Tylenol or Motrin as needed  Restart Zyrtec--will get OTC  Rx for Flonase, Sudafed, Zithromax provided  If no improvement in 2 weeks, recommend referral back to ENT    See PCP or RTC if symptoms persist/worsen  See PCP for routine f/u visit and management of chronic medical conditions

## 2019-07-01 DIAGNOSIS — H91.91 HEARING LOSS OF RIGHT EAR, UNSPECIFIED HEARING LOSS TYPE: Primary | ICD-10-CM

## 2019-07-19 DIAGNOSIS — G89.29 CHRONIC BILATERAL LOW BACK PAIN WITHOUT SCIATICA: ICD-10-CM

## 2019-07-19 DIAGNOSIS — M54.50 CHRONIC BILATERAL LOW BACK PAIN WITHOUT SCIATICA: ICD-10-CM

## 2019-07-19 RX ORDER — IBUPROFEN 800 MG/1
TABLET ORAL
Qty: 90 TABLET | Refills: 2 | Status: SHIPPED | OUTPATIENT
Start: 2019-07-19 | End: 2020-01-09 | Stop reason: SDUPTHER

## 2019-09-19 ENCOUNTER — PROCEDURE VISIT (OUTPATIENT)
Dept: OBSTETRICS AND GYNECOLOGY | Facility: CLINIC | Age: 29
End: 2019-09-19

## 2019-09-19 VITALS
BODY MASS INDEX: 29.66 KG/M2 | HEIGHT: 62 IN | WEIGHT: 161.2 LBS | DIASTOLIC BLOOD PRESSURE: 60 MMHG | SYSTOLIC BLOOD PRESSURE: 100 MMHG

## 2019-09-19 DIAGNOSIS — Z30.46 ENCOUNTER FOR NEXPLANON REMOVAL: Primary | ICD-10-CM

## 2019-09-19 PROCEDURE — 11982 REMOVE DRUG IMPLANT DEVICE: CPT | Performed by: OBSTETRICS & GYNECOLOGY

## 2019-09-19 RX ORDER — ETONOGESTREL AND ETHINYL ESTRADIOL 11.7; 2.7 MG/1; MG/1
1 INSERT, EXTENDED RELEASE VAGINAL
Qty: 1 EACH | Refills: 11 | Status: SHIPPED | OUTPATIENT
Start: 2019-09-19 | End: 2020-01-09 | Stop reason: SDUPTHER

## 2019-09-19 NOTE — PROGRESS NOTES
Chief complaint I want my Nexplanon out and I want to try maneuvering    Patient with Nexplanon in the left arm.  She wants for removal because of weight gain.  She wants prescription for NuvaRing risk benefits alternatives reviewed.    Procedure reviewed movable of Nexplanon    Preprocedure request removal Nexplanon    Postprocedure requests removal of Nexplanon    Procedure: Patient was positioned the Nexplanon was easily palpable.  The arm was painted with Betadine.  Using 8 cc of 1% lidocaine the distal hand was anesthetized.  An incision was made over the distal and at the insertion site.  The distal end was brought forth the scar tissue cleared off and removed.  The removed device     was measured and measured 4 cm.  The skin closed with Steri-Strip and the arm bandage.

## 2020-01-09 ENCOUNTER — OFFICE VISIT (OUTPATIENT)
Dept: FAMILY MEDICINE CLINIC | Facility: CLINIC | Age: 30
End: 2020-01-09

## 2020-01-09 VITALS
WEIGHT: 163 LBS | TEMPERATURE: 98.4 F | SYSTOLIC BLOOD PRESSURE: 104 MMHG | BODY MASS INDEX: 30 KG/M2 | HEART RATE: 82 BPM | RESPIRATION RATE: 20 BRPM | HEIGHT: 62 IN | OXYGEN SATURATION: 98 % | DIASTOLIC BLOOD PRESSURE: 60 MMHG

## 2020-01-09 DIAGNOSIS — L70.9 MILD ACNE: ICD-10-CM

## 2020-01-09 DIAGNOSIS — R11.0 NAUSEA: ICD-10-CM

## 2020-01-09 DIAGNOSIS — Z13.29 SCREENING FOR THYROID DISORDER: ICD-10-CM

## 2020-01-09 DIAGNOSIS — G89.29 CHRONIC BILATERAL LOW BACK PAIN WITHOUT SCIATICA: ICD-10-CM

## 2020-01-09 DIAGNOSIS — Z13.220 SCREENING FOR LIPOID DISORDERS: ICD-10-CM

## 2020-01-09 DIAGNOSIS — M54.41 CHRONIC BILATERAL LOW BACK PAIN WITH BILATERAL SCIATICA: ICD-10-CM

## 2020-01-09 DIAGNOSIS — Z78.9 USES VAGINAL CONTRACEPTIVE RING: ICD-10-CM

## 2020-01-09 DIAGNOSIS — Z00.00 WELL ADULT EXAM: Primary | ICD-10-CM

## 2020-01-09 DIAGNOSIS — Z13.1 SCREENING FOR DIABETES MELLITUS: ICD-10-CM

## 2020-01-09 DIAGNOSIS — M54.42 CHRONIC BILATERAL LOW BACK PAIN WITH BILATERAL SCIATICA: ICD-10-CM

## 2020-01-09 DIAGNOSIS — L70.0 ACNE VULGARIS: ICD-10-CM

## 2020-01-09 DIAGNOSIS — G89.29 CHRONIC BILATERAL LOW BACK PAIN WITH BILATERAL SCIATICA: ICD-10-CM

## 2020-01-09 DIAGNOSIS — M54.50 CHRONIC BILATERAL LOW BACK PAIN WITHOUT SCIATICA: ICD-10-CM

## 2020-01-09 PROCEDURE — 99395 PREV VISIT EST AGE 18-39: CPT | Performed by: NURSE PRACTITIONER

## 2020-01-09 RX ORDER — CLINDAMYCIN AND BENZOYL PEROXIDE 10; 50 MG/G; MG/G
GEL TOPICAL 2 TIMES DAILY
Qty: 50 G | Refills: 11 | Status: SHIPPED | OUTPATIENT
Start: 2020-01-09 | End: 2021-02-22

## 2020-01-09 RX ORDER — ETONOGESTREL AND ETHINYL ESTRADIOL 11.7; 2.7 MG/1; MG/1
1 INSERT, EXTENDED RELEASE VAGINAL
Qty: 3 EACH | Refills: 4 | Status: SHIPPED | OUTPATIENT
Start: 2020-01-09 | End: 2021-01-08

## 2020-01-09 RX ORDER — ONDANSETRON 8 MG/1
8 TABLET, ORALLY DISINTEGRATING ORAL EVERY 8 HOURS PRN
Qty: 30 TABLET | Refills: 5 | Status: SHIPPED | OUTPATIENT
Start: 2020-01-09 | End: 2020-12-28

## 2020-01-09 RX ORDER — IBUPROFEN 800 MG/1
800 TABLET ORAL EVERY 8 HOURS PRN
Qty: 90 TABLET | Refills: 5 | Status: SHIPPED | OUTPATIENT
Start: 2020-01-09 | End: 2021-01-12 | Stop reason: SDUPTHER

## 2020-01-20 ENCOUNTER — APPOINTMENT (OUTPATIENT)
Dept: LAB | Facility: HOSPITAL | Age: 30
End: 2020-01-20

## 2020-01-20 PROCEDURE — 80053 COMPREHEN METABOLIC PANEL: CPT | Performed by: NURSE PRACTITIONER

## 2020-01-20 PROCEDURE — 85025 COMPLETE CBC W/AUTO DIFF WBC: CPT | Performed by: NURSE PRACTITIONER

## 2020-01-20 PROCEDURE — 82306 VITAMIN D 25 HYDROXY: CPT | Performed by: NURSE PRACTITIONER

## 2020-01-20 PROCEDURE — 80061 LIPID PANEL: CPT | Performed by: NURSE PRACTITIONER

## 2020-01-20 PROCEDURE — 84443 ASSAY THYROID STIM HORMONE: CPT | Performed by: NURSE PRACTITIONER

## 2020-01-20 PROCEDURE — 83036 HEMOGLOBIN GLYCOSYLATED A1C: CPT | Performed by: NURSE PRACTITIONER

## 2020-01-21 LAB
25(OH)D3 SERPL-MCNC: 23.3 NG/ML (ref 30–100)
ALBUMIN SERPL-MCNC: 4.2 G/DL (ref 3.5–5.2)
ALBUMIN/GLOB SERPL: 1.6 G/DL
ALP SERPL-CCNC: 69 U/L (ref 39–117)
ALT SERPL W P-5'-P-CCNC: 93 U/L (ref 1–33)
ANION GAP SERPL CALCULATED.3IONS-SCNC: 15.4 MMOL/L (ref 5–15)
AST SERPL-CCNC: 47 U/L (ref 1–32)
BASOPHILS # BLD AUTO: 0.04 10*3/MM3 (ref 0–0.2)
BASOPHILS NFR BLD AUTO: 0.7 % (ref 0–1.5)
BILIRUB SERPL-MCNC: 0.4 MG/DL (ref 0.2–1.2)
BUN BLD-MCNC: 8 MG/DL (ref 6–20)
BUN/CREAT SERPL: 11.9 (ref 7–25)
CALCIUM SPEC-SCNC: 9.1 MG/DL (ref 8.6–10.5)
CHLORIDE SERPL-SCNC: 101 MMOL/L (ref 98–107)
CHOLEST SERPL-MCNC: 216 MG/DL (ref 0–200)
CO2 SERPL-SCNC: 22.6 MMOL/L (ref 22–29)
CREAT BLD-MCNC: 0.67 MG/DL (ref 0.57–1)
DEPRECATED RDW RBC AUTO: 38.7 FL (ref 37–54)
EOSINOPHIL # BLD AUTO: 0.06 10*3/MM3 (ref 0–0.4)
EOSINOPHIL NFR BLD AUTO: 1.1 % (ref 0.3–6.2)
ERYTHROCYTE [DISTWIDTH] IN BLOOD BY AUTOMATED COUNT: 12.8 % (ref 12.3–15.4)
GFR SERPL CREATININE-BSD FRML MDRD: 103 ML/MIN/1.73
GLOBULIN UR ELPH-MCNC: 2.7 GM/DL
GLUCOSE BLD-MCNC: 73 MG/DL (ref 65–99)
HBA1C MFR BLD: 5.16 % (ref 4.8–5.6)
HCT VFR BLD AUTO: 41.7 % (ref 34–46.6)
HDLC SERPL-MCNC: 50 MG/DL (ref 40–60)
HGB BLD-MCNC: 14.1 G/DL (ref 12–15.9)
IMM GRANULOCYTES # BLD AUTO: 0.07 10*3/MM3 (ref 0–0.05)
IMM GRANULOCYTES NFR BLD AUTO: 1.3 % (ref 0–0.5)
LDLC SERPL CALC-MCNC: 152 MG/DL (ref 0–100)
LDLC/HDLC SERPL: 3.04 {RATIO}
LYMPHOCYTES # BLD AUTO: 1.4 10*3/MM3 (ref 0.7–3.1)
LYMPHOCYTES NFR BLD AUTO: 25.6 % (ref 19.6–45.3)
MCH RBC QN AUTO: 28.5 PG (ref 26.6–33)
MCHC RBC AUTO-ENTMCNC: 33.8 G/DL (ref 31.5–35.7)
MCV RBC AUTO: 84.2 FL (ref 79–97)
MONOCYTES # BLD AUTO: 0.45 10*3/MM3 (ref 0.1–0.9)
MONOCYTES NFR BLD AUTO: 8.2 % (ref 5–12)
NEUTROPHILS # BLD AUTO: 3.45 10*3/MM3 (ref 1.7–7)
NEUTROPHILS NFR BLD AUTO: 63.1 % (ref 42.7–76)
NRBC BLD AUTO-RTO: 0 /100 WBC (ref 0–0.2)
PLATELET # BLD AUTO: 271 10*3/MM3 (ref 140–450)
PMV BLD AUTO: 11.6 FL (ref 6–12)
POTASSIUM BLD-SCNC: 4.6 MMOL/L (ref 3.5–5.2)
PROT SERPL-MCNC: 6.9 G/DL (ref 6–8.5)
RBC # BLD AUTO: 4.95 10*6/MM3 (ref 3.77–5.28)
SODIUM BLD-SCNC: 139 MMOL/L (ref 136–145)
TRIGL SERPL-MCNC: 69 MG/DL (ref 0–150)
TSH SERPL DL<=0.05 MIU/L-ACNC: 1.93 UIU/ML (ref 0.27–4.2)
VLDLC SERPL-MCNC: 13.8 MG/DL (ref 5–40)
WBC NRBC COR # BLD: 5.47 10*3/MM3 (ref 3.4–10.8)

## 2020-01-22 ENCOUNTER — TELEPHONE (OUTPATIENT)
Dept: FAMILY MEDICINE CLINIC | Facility: CLINIC | Age: 30
End: 2020-01-22

## 2020-01-22 NOTE — TELEPHONE ENCOUNTER
----- Message from LEEANN Chatman sent at 1/22/2020  1:16 PM CST -----  Vitamin D is low.  Recommend vitamin D3 once daily.  Liver function continues to be elevated.  I would recommend she see GI if she is open to this?  Cholesterol is elevated.  Monitor diet closely and avoid greasy/fatty foods.      Pt wants to try dieting first before the referral to GI to see if that will help.

## 2020-02-02 NOTE — PROGRESS NOTES
Subjective   Sofiya Almaguer is a 29 y.o. female.     She presents today for her annual wellness physical.  She is due for fasting labs.  She also needs a refill on her acne medication today in the office.  She reports that she is doing well on the NuvaRing, and would like to continue this.  She continues to have complaints of lower back pain.  This has been a problem for her in the past.  She would like to set a referral to physical therapy if possible.  She is otherwise without any other new complaints today in the office.    Rash   This is a chronic problem. The current episode started more than 1 year ago. The problem has been waxing and waning since onset. The affected locations include the face. The rash is characterized by redness. Associated symptoms include joint pain. Pertinent negatives include no anorexia, congestion, cough, diarrhea, eye pain, facial edema, fatigue, fever, nail changes, rhinorrhea, shortness of breath, sore throat or vomiting. The treatment provided no relief.   Back Pain   This is a chronic problem. The current episode started more than 1 year ago. The problem occurs intermittently. The problem has been waxing and waning since onset. The pain is present in the lumbar spine. The quality of the pain is described as aching. Pertinent negatives include no abdominal pain, bladder incontinence, bowel incontinence, chest pain, dysuria, fever, headaches, leg pain, numbness, paresis, paresthesias, pelvic pain, perianal numbness, tingling, weakness or weight loss. Risk factors include lack of exercise. The treatment provided no relief.        The following portions of the patient's history were reviewed and updated as appropriate: allergies, current medications, past family history, past medical history, past social history, past surgical history and problem list.    Review of Systems   Constitutional: Negative.  Negative for fatigue, fever and unexpected weight loss.   HENT: Negative.   Negative for congestion, rhinorrhea, sore throat and swollen glands.    Eyes: Negative.  Negative for pain.   Respiratory: Negative.  Negative for cough and shortness of breath.    Cardiovascular: Negative.  Negative for chest pain.   Gastrointestinal: Negative.  Negative for abdominal pain, anorexia, bowel incontinence, diarrhea and vomiting.   Genitourinary: Negative for dysuria, pelvic pain and urinary incontinence.   Musculoskeletal: Positive for back pain and joint pain.   Skin: Positive for rash. Negative for nail changes.   Allergic/Immunologic: Negative.    Neurological: Negative.  Negative for tingling, weakness, numbness and paresthesias.   Hematological: Negative.    Psychiatric/Behavioral: Negative.        Objective   Physical Exam   Constitutional: She is oriented to person, place, and time. Vital signs are normal. She appears well-developed and well-nourished. No distress. She is obese.  HENT:   Head: Normocephalic.   Right Ear: External ear normal.   Left Ear: External ear normal.   Nose: Nose normal.   Mouth/Throat: Oropharynx is clear and moist. No oropharyngeal exudate.   Eyes: Pupils are equal, round, and reactive to light. Conjunctivae and EOM are normal. Right eye exhibits no discharge. Left eye exhibits no discharge.   Neck: Normal range of motion. Neck supple. No tracheal deviation present. No thyromegaly present.   Cardiovascular: Normal rate, regular rhythm and normal heart sounds. Exam reveals no gallop and no friction rub.   No murmur heard.  Pulmonary/Chest: Effort normal and breath sounds normal. No respiratory distress. She has no wheezes. She has no rales. She exhibits no tenderness.   Musculoskeletal: Normal range of motion.        Lumbar back: She exhibits pain and spasm.   Lymphadenopathy:     She has no cervical adenopathy.   Neurological: She is alert and oriented to person, place, and time.   Skin: Skin is warm and dry. Capillary refill takes less than 2 seconds. No rash noted.  She is not diaphoretic. No erythema. No pallor.   Psychiatric: She has a normal mood and affect. Her behavior is normal. Judgment and thought content normal.   Nursing note and vitals reviewed.        Assessment/Plan   Sofiya was seen today for annual exam.    Diagnoses and all orders for this visit:    Well adult exam  -     CBC & Differential  -     Comprehensive Metabolic Panel  -     Hemoglobin A1c  -     Lipid Panel  -     TSH  -     Vitamin D 25 Hydroxy  -     CBC Auto Differential    Screening for diabetes mellitus  -     CBC & Differential  -     Comprehensive Metabolic Panel  -     Hemoglobin A1c  -     Lipid Panel  -     TSH  -     Vitamin D 25 Hydroxy  -     CBC Auto Differential    Screening for lipoid disorders  -     CBC & Differential  -     Comprehensive Metabolic Panel  -     Hemoglobin A1c  -     Lipid Panel  -     TSH  -     Vitamin D 25 Hydroxy  -     CBC Auto Differential    Screening for thyroid disorder  -     CBC & Differential  -     Comprehensive Metabolic Panel  -     Hemoglobin A1c  -     Lipid Panel  -     TSH  -     Vitamin D 25 Hydroxy  -     CBC Auto Differential    Mild acne  -     CBC & Differential  -     Comprehensive Metabolic Panel  -     Hemoglobin A1c  -     Lipid Panel  -     TSH  -     Vitamin D 25 Hydroxy  -     CBC Auto Differential    Chronic bilateral low back pain with bilateral sciatica  -     CBC & Differential  -     Comprehensive Metabolic Panel  -     Hemoglobin A1c  -     Lipid Panel  -     TSH  -     Vitamin D 25 Hydroxy  -     CBC Auto Differential    Nausea  -     ondansetron ODT (ZOFRAN ODT) 8 MG disintegrating tablet; Take 1 tablet by mouth Every 8 (Eight) Hours As Needed for Nausea or Vomiting.  -     CBC & Differential  -     Comprehensive Metabolic Panel  -     Hemoglobin A1c  -     Lipid Panel  -     TSH  -     Vitamin D 25 Hydroxy  -     CBC Auto Differential    Acne vulgaris  -     clindamycin-benzoyl peroxide (BENZACLIN) 1-5 % gel; Apply  topically to  the appropriate area as directed 2 (Two) Times a Day.  -     CBC & Differential  -     Comprehensive Metabolic Panel  -     Hemoglobin A1c  -     Lipid Panel  -     TSH  -     Vitamin D 25 Hydroxy  -     CBC Auto Differential    Chronic bilateral low back pain without sciatica  -     ibuprofen (ADVIL,MOTRIN) 800 MG tablet; Take 1 tablet by mouth Every 8 (Eight) Hours As Needed for Mild Pain .  -     CBC & Differential  -     Comprehensive Metabolic Panel  -     Hemoglobin A1c  -     Lipid Panel  -     TSH  -     Vitamin D 25 Hydroxy  -     Ambulatory Referral to Physical Therapy Evaluate and treat  -     CBC Auto Differential    Uses vaginal contraceptive ring  -     etonogestrel-ethinyl estradiol (NUVARING) 0.12-0.015 MG/24HR vaginal ring; Insert 1 each into the vagina Every 28 (Twenty-Eight) Days. Insert vaginally  for 3 consecutive weeks, then remove for 1 week.  -     CBC & Differential  -     Comprehensive Metabolic Panel  -     Hemoglobin A1c  -     Lipid Panel  -     TSH  -     Vitamin D 25 Hydroxy  -     CBC Auto Differential               Patient's Body mass index is 30.3 kg/m². BMI is above normal parameters. Recommendations include: educational material.    Fasting labs.  Referral to physical therapy for evaluation and treatment.  Continue all other current medications.  Follow up in 1 year for routine follow up.  Follow up sooner for problems/concerns.  Patient verbalized understanding and agreement with plan of care.        This document has been electronically signed by LEEANN Chatman on February 2, 2020 3:43 PM

## 2020-02-25 ENCOUNTER — PATIENT MESSAGE (OUTPATIENT)
Dept: FAMILY MEDICINE CLINIC | Facility: CLINIC | Age: 30
End: 2020-02-25

## 2020-02-25 DIAGNOSIS — M54.50 CHRONIC BILATERAL LOW BACK PAIN WITHOUT SCIATICA: Primary | ICD-10-CM

## 2020-02-25 DIAGNOSIS — G89.29 CHRONIC BILATERAL LOW BACK PAIN WITHOUT SCIATICA: Primary | ICD-10-CM

## 2020-03-19 ENCOUNTER — PATIENT MESSAGE (OUTPATIENT)
Dept: FAMILY MEDICINE CLINIC | Facility: CLINIC | Age: 30
End: 2020-03-19

## 2020-03-19 RX ORDER — DEXTROMETHORPHAN HYDROBROMIDE AND PROMETHAZINE HYDROCHLORIDE 15; 6.25 MG/5ML; MG/5ML
5 SYRUP ORAL 4 TIMES DAILY PRN
Qty: 240 ML | Refills: 0 | Status: SHIPPED | OUTPATIENT
Start: 2020-03-19 | End: 2020-07-30

## 2020-03-19 RX ORDER — AZITHROMYCIN 500 MG/1
500 TABLET, FILM COATED ORAL DAILY
Qty: 5 TABLET | Refills: 0 | Status: SHIPPED | OUTPATIENT
Start: 2020-03-19 | End: 2020-03-24

## 2020-03-19 NOTE — TELEPHONE ENCOUNTER
From: Sofiya Almaguer  To: Ayde Jon APRN  Sent: 3/19/2020 7:10 AM CDT  Subject: Non-Urgent Medical Question    good morning ayde.   i knowntherebis alot going on with cv19. and you probably can't take patients. i need to see if i can get an antibiotic and cough syrup sent in. i dont have a fever but my ear is hurting and i have alot of sinus congestion (green) also a very wet cough. i barely have a voice left this morning. im pretty sure its my seasonal cold but i feel an ear infection coming on. 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 please and thanks....be safe

## 2020-05-29 ENCOUNTER — TELEPHONE (OUTPATIENT)
Dept: FAMILY MEDICINE CLINIC | Facility: CLINIC | Age: 30
End: 2020-05-29

## 2020-07-30 ENCOUNTER — TELEMEDICINE (OUTPATIENT)
Dept: FAMILY MEDICINE CLINIC | Facility: TELEHEALTH | Age: 30
End: 2020-07-30

## 2020-07-30 DIAGNOSIS — B37.2 CANDIDAL SKIN INFECTION: ICD-10-CM

## 2020-07-30 DIAGNOSIS — L30.9 DERMATITIS: Primary | ICD-10-CM

## 2020-07-30 PROCEDURE — 99213 OFFICE O/P EST LOW 20 MIN: CPT | Performed by: NURSE PRACTITIONER

## 2020-07-30 RX ORDER — NYSTATIN 100000 [USP'U]/G
POWDER TOPICAL 3 TIMES DAILY
Qty: 60 G | Refills: 1 | Status: SHIPPED | OUTPATIENT
Start: 2020-07-30 | End: 2020-08-20

## 2020-07-30 RX ORDER — CLOBETASOL PROPIONATE 0.5 MG/G
OINTMENT TOPICAL 2 TIMES DAILY PRN
Qty: 30 G | Refills: 0 | Status: SHIPPED | OUTPATIENT
Start: 2020-07-30 | End: 2020-08-20

## 2020-07-30 RX ORDER — HYDROXYZINE PAMOATE 100 MG/1
100 CAPSULE ORAL 4 TIMES DAILY PRN
Qty: 40 CAPSULE | Refills: 0 | Status: SHIPPED | OUTPATIENT
Start: 2020-07-30 | End: 2020-12-28

## 2020-07-30 NOTE — PROGRESS NOTES
Subjective   Sofiya Almaguer is a 30 y.o. female.     She has two different rashes. She has a hyperpigmented, moist, itchy rash under her breasts that has been there for 3 weeks. She has used clotramazole and triamcinolone for 7 days and it has not started to help. She also has a new rash on her legs and her back-- it started on one leg and now is on both--this started about a week ago. It is very itchy and there are multiple small red bumps. She denies fever or any other symptoms.       The following portions of the patient's history were reviewed and updated as appropriate: allergies, current medications, past family history, past medical history, past social history, past surgical history and problem list.    Review of Systems   Constitutional: Negative for fever.   Skin: Positive for rash.       Objective   Physical Exam   Constitutional: She appears well-developed.   Skin: Rash noted.        Excoriated, red, small papules left flank subscapular     Psychiatric: She has a normal mood and affect.         Assessment/Plan   Sofiya was seen today for rash.    Diagnoses and all orders for this visit:    Dermatitis    Candidal skin infection    Other orders  -     hydrOXYzine pamoate (VISTARIL) 100 MG capsule; Take 1 capsule by mouth 4 (Four) Times a Day As Needed for Itching.  -     clobetasol (TEMOVATE) 0.05 % ointment; Apply  topically to the appropriate area as directed 2 (Two) Times a Day As Needed (rash, itching).  -     nystatin (MYCOSTATIN) 066329 UNIT/GM powder; Apply  topically to the appropriate area as directed 3 (Three) Times a Day. Use until rash is healed          I spent 15 minutes in the patient's chart for this video visit.

## 2020-07-30 NOTE — PATIENT INSTRUCTIONS
-Use Nystatin powder until rash healed. I have provided 1 refill on the med.  -Limit showers to 5 minutes avoid hot water, blot dry with a towel- do not rub skin. Immediately use a thick, bland moisturizer, then use steroid ointment to dermatitis rash (not the rash under the breasts).        Hydroxyzine capsules or tablets  What is this medicine?  HYDROXYZINE (adina DROX i zeen) is an antihistamine. This medicine is used to treat allergy symptoms. It is also used to treat anxiety and tension. This medicine can be used with other medicines to induce sleep before surgery.  This medicine may be used for other purposes; ask your health care provider or pharmacist if you have questions.  COMMON BRAND NAME(S): ANX, Atarax, Rezine, Vistaril  What should I tell my health care provider before I take this medicine?  They need to know if you have any of these conditions:  · glaucoma  · heart disease  · history of irregular heartbeat  · kidney disease  · liver disease  · lung or breathing disease, like asthma  · stomach or intestine problems  · thyroid disease  · trouble passing urine  · an unusual or allergic reaction to hydroxyzine, cetirizine, other medicines, foods, dyes or preservatives  · pregnant or trying to get pregnant  · breast-feeding  How should I use this medicine?  Take this medicine by mouth with a full glass of water. Follow the directions on the prescription label. You may take this medicine with food or on an empty stomach. Take your medicine at regular intervals. Do not take your medicine more often than directed.  Talk to your pediatrician regarding the use of this medicine in children. Special care may be needed. While this drug may be prescribed for children as young as 6 years of age for selected conditions, precautions do apply.  Patients over 65 years old may have a stronger reaction and need a smaller dose.  Overdosage: If you think you have taken too much of this medicine contact a poison control  center or emergency room at once.  NOTE: This medicine is only for you. Do not share this medicine with others.  What if I miss a dose?  If you miss a dose, take it as soon as you can. If it is almost time for your next dose, take only that dose. Do not take double or extra doses.  What may interact with this medicine?  Do not take this medicine with any of the following medications:  · cisapride  · dronedarone  · pimozide  · thioridazine  This medicine may also interact with the following medications:  · alcohol  · antihistamines for allergy, cough, and cold  · atropine  · barbiturate medicines for sleep or seizures, like phenobarbital  · certain antibiotics like erythromycin or clarithromycin  · certain medicines for anxiety or sleep  · certain medicines for bladder problems like oxybutynin, tolterodine  · certain medicines for depression or psychotic disturbances  · certain medicines for irregular heart beat  · certain medicines for Parkinson's disease like benztropine, trihexyphenidyl  · certain medicines for seizures like phenobarbital, primidone  · certain medicines for stomach problems like dicyclomine, hyoscyamine  · certain medicines for travel sickness like scopolamine  · ipratropium  · narcotic medicines for pain  · other medicines that prolong the QT interval (an abnormal heart rhythm) like dofetilide  This list may not describe all possible interactions. Give your health care provider a list of all the medicines, herbs, non-prescription drugs, or dietary supplements you use. Also tell them if you smoke, drink alcohol, or use illegal drugs. Some items may interact with your medicine.  What should I watch for while using this medicine?  Tell your doctor or health care professional if your symptoms do not improve.  You may get drowsy or dizzy. Do not drive, use machinery, or do anything that needs mental alertness until you know how this medicine affects you. Do not stand or sit up quickly, especially if  you are an older patient. This reduces the risk of dizzy or fainting spells. Alcohol may interfere with the effect of this medicine. Avoid alcoholic drinks.  Your mouth may get dry. Chewing sugarless gum or sucking hard candy, and drinking plenty of water may help. Contact your doctor if the problem does not go away or is severe.  This medicine may cause dry eyes and blurred vision. If you wear contact lenses you may feel some discomfort. Lubricating drops may help. See your eye doctor if the problem does not go away or is severe.  If you are receiving skin tests for allergies, tell your doctor you are using this medicine.  What side effects may I notice from receiving this medicine?  Side effects that you should report to your doctor or health care professional as soon as possible:  · allergic reactions like skin rash, itching or hives, swelling of the face, lips, or tongue  · changes in vision  · confusion  · fast, irregular heartbeat  · seizures  · tremor  · trouble passing urine or change in the amount of urine  Side effects that usually do not require medical attention (report to your doctor or health care professional if they continue or are bothersome):  · constipation  · drowsiness  · dry mouth  · headache  · tiredness  This list may not describe all possible side effects. Call your doctor for medical advice about side effects. You may report side effects to FDA at 0-968-FDA-6818.  Where should I keep my medicine?  Keep out of the reach of children.  Store at room temperature between 15 and 30 degrees C (59 and 86 degrees F). Keep container tightly closed. Throw away any unused medicine after the expiration date.  NOTE: This sheet is a summary. It may not cover all possible information. If you have questions about this medicine, talk to your doctor, pharmacist, or health care provider.  © 2020 [employer] (2019-12-09 13:19:55)  Clobetasol Propionate Topical Ointment  What is this  medicine?  CLOBETASOL (kloe BAY ta sol) is a corticosteroid. It is used on the skin to treat itching, redness, and swelling caused by some skin conditions.  This medicine may be used for other purposes; ask your health care provider or pharmacist if you have questions.  COMMON BRAND NAME(S): Cormax, Embeline, Temovate, Temovate E  What should I tell my health care provider before I take this medicine?  They need to know if you have any of these conditions:  · any type of active infection including measles, tuberculosis, herpes, or chickenpox  · circulation problems or vascular disease  · large areas of burned or damaged skin  · rosacea  · skin wasting or thinning  · an unusual or allergic reaction to clobetasol, corticosteroids, other medicines, foods, dyes, or preservatives  · pregnant or trying to get pregnant  · breast-feeding  How should I use this medicine?  This medicine is for external use only. Do not take by mouth. Follow the directions on the prescription label. Wash your hands before and after use. Apply a thin film of medicine to the affected area. Do not cover with a bandage or dressing unless your doctor or health care professional tells you to. Do not get this medicine in your eyes. If you do, rinse out with plenty of cool tap water. It is important not to use more medicine than prescribed. Do not use your medicine more often than directed. To do so may increase the chance of side effects.  Talk to your pediatrician regarding the use of this medicine in children. Special care may be needed.  Elderly patients are more likely to have damaged skin through aging, and this may increase side effects. This medicine should only be used for brief periods and infrequently in older patients.  Overdosage: If you think you have taken too much of this medicine contact a poison control center or emergency room at once.  NOTE: This medicine is only for you. Do not share this medicine with others.  What if I miss a  dose?  If you miss a dose, use it as soon as you can. If it is almost time for your next dose, use only that dose. Do not use double or extra doses without advice.  What may interact with this medicine?  Interactions are not expected. Do not use cosmetics or other skin care products on the treated area.  This list may not describe all possible interactions. Give your health care provider a list of all the medicines, herbs, non-prescription drugs, or dietary supplements you use. Also tell them if you smoke, drink alcohol, or use illegal drugs. Some items may interact with your medicine.  What should I watch for while using this medicine?  Tell your doctor or health care professional if your symptoms do not get better within 2 weeks, or if you develop skin irritation from the medicine.  Tell your doctor or health care professional if you are exposed to anyone with measles or chickenpox, or if you develop sores or blisters that do not heal properly.  What side effects may I notice from receiving this medicine?  Side effects that you should report to your doctor or health care professional as soon as possible:  · allergic reactions like skin rash, itching or hives, swelling of the face, lips, or tongue  · changes in vision  · lack of healing of the skin condition  · painful, red, pus filled blisters on the skin or in hair follicles  · thinning of the skin with easy bruising  Side effects that usually do not require medical attention (report to your doctor or health care professional if they continue or are bothersome):  · burning, irritation of the skin  · redness or scaling of the skin  This list may not describe all possible side effects. Call your doctor for medical advice about side effects. You may report side effects to FDA at 5-315-FDA-1706.  Where should I keep my medicine?  Keep out of the reach of children.  Store at room temperature between 15 and 30 degrees C (59 and 86 degrees F). Keep away from heat and  direct light. Do not freeze. Throw away any unused medicine after the expiration date.  NOTE: This sheet is a summary. It may not cover all possible information. If you have questions about this medicine, talk to your doctor, pharmacist, or health care provider.  © 2020 [employer] Standard (2009-04-01 17:22:39)  Nystatin topical powder  What is this medicine?  NYSTATIN (david STAT in) is an antifungal medicine. It is used to treat certain kinds of fungal or yeast infections of the skin.  This medicine may be used for other purposes; ask your health care provider or pharmacist if you have questions.  COMMON BRAND NAME(S): Mycostatin, Nyamyc, Nyata, Nystop, Pedi-Dri  What should I tell my health care provider before I take this medicine?  They need to know if you have any of these conditions:  · an unusual or allergic reaction to nystatin, other foods, dyes or preservatives  · pregnant or trying to get pregnant  · breast-feeding  How should I use this medicine?  This medicine is for external use on the skin only. Follow the directions on the prescription label. Dust the powder on the affected area (or into socks and shoes). If you are treating diaper rash, do not use tight-fitting diapers or plastic pants. Do not get the medicine in your eyes. If you do, rinse out with plenty of cool tap water. Do not breathe in the powder. Do not use your medicine more often than directed. Use your doses at regular intervals. Finish the full course prescribed by your doctor or health care professional even if you think your condition is better. Do not stop using except on the advice of your doctor or health care professional.  Talk to your pediatrician regarding the use of this medicine in children. Special care may be needed.  Overdosage: If you think you have taken too much of this medicine contact a poison control center or emergency room at once.  NOTE: This medicine is only for you. Do not share this medicine with others.  What  if I miss a dose?  If you miss a dose, use it as soon as you can. If it is almost time for your next dose, use only that dose. Do not use double or extra doses.  What may interact with this medicine?  Interactions are not expected. Do not use any other skin products on the affected area without telling your doctor or health care professional.  This list may not describe all possible interactions. Give your health care provider a list of all the medicines, herbs, non-prescription drugs, or dietary supplements you use. Also tell them if you smoke, drink alcohol, or use illegal drugs. Some items may interact with your medicine.  What should I watch for while using this medicine?  Tell your doctor or health care professional if your symptoms do not improve after 3 days.  After bathing make sure that your skin is very dry. Fungal infections like moist conditions. Do not walk around barefoot.  To help prevent reinfection, wear freshly washed cotton, not synthetic, clothing.  What side effects may I notice from receiving this medicine?  Side effects that you should report to your doctor or health care professional as soon as possible:  · allergic reactions like skin rash, itching or hives, swelling of the face, lips, or tongue  Side effects that usually do not require medical attention (report to your doctor or health care professional if they continue or are bothersome):  · skin irritation  This list may not describe all possible side effects. Call your doctor for medical advice about side effects. You may report side effects to FDA at 1-423-FDA-6910.  Where should I keep my medicine?  Keep out of the reach of children.  Store at room temperature between 15 and 30 degrees C (59 and 86 degrees F). Throw away any unused medicine after the expiration date.  NOTE: This sheet is a summary. It may not cover all possible information. If you have questions about this medicine, talk to your doctor, pharmacist, or health care  provider.  © 2020 [employer] Standard (2017-01-19 10:36:02)  Skin Yeast Infection    A skin yeast infection is a condition in which there is an overgrowth of yeast (candida) that normally lives on the skin. This condition usually occurs in areas of the skin that are constantly warm and moist, such as the armpits or the groin.  What are the causes?  This condition is caused by a change in the normal balance of the yeast and bacteria that live on the skin.  What increases the risk?  You are more likely to develop this condition if you:  · Are obese.  · Are pregnant.  · Take birth control pills.  · Have diabetes.  · Take antibiotic medicines.  · Take steroid medicines.  · Are malnourished.  · Have a weak body defense system (immune system).  · Are 65 years of age or older.  · Wear tight clothing.  What are the signs or symptoms?  The most common symptom of this condition is itchiness in the affected area. Other symptoms include:  · Red, swollen area of the skin.  · Bumps on the skin.  How is this diagnosed?  · This condition is diagnosed with a medical history and physical exam.  · Your health care provider may check for yeast by taking light scrapings of the skin to be viewed under a microscope.  How is this treated?  This condition is treated with medicine. Medicines may be prescribed or be available over the counter. The medicines may be:  · Taken by mouth (orally).  · Applied as a cream or powder to your skin.  Follow these instructions at home:    · Take or apply over-the-counter and prescription medicines only as told by your health care provider.  · Maintain a healthy weight. If you need help losing weight, talk with your health care provider.  · Keep your skin clean and dry.  · If you have diabetes, keep your blood sugar under control.  · Keep all follow-up visits as told by your health care provider. This is important.  Contact a health care provider if:  · Your symptoms go away and then return.  · Your  symptoms do not get better with treatment.  · Your symptoms get worse.  · Your rash spreads.  · You have a fever or chills.  · You have new symptoms.  · You have new warmth or redness of your skin.  Summary  · A skin yeast infection is a condition in which there is an overgrowth of yeast (candida) that normally lives on the skin. This condition is caused by a change in the normal balance of the yeast and bacteria that live on the skin.  · Take or apply over-the-counter and prescription medicines only as told by your health care provider.  · Keep your skin clean and dry.  · Contact a health care provider if your symptoms do not get better with treatment.  This information is not intended to replace advice given to you by your health care provider. Make sure you discuss any questions you have with your health care provider.  Document Released: 09/04/2012 Document Revised: 05/07/2019 Document Reviewed: 05/07/2019  [employer] Patient Education © 2020 [employer]  Atopic Dermatitis  Atopic dermatitis is a skin disorder that causes inflammation of the skin. This is the most common type of eczema. Eczema is a group of skin conditions that cause the skin to be itchy, red, and swollen. This condition is generally worse during the cooler winter months and often improves during the warm summer months. Symptoms can vary from person to person.  Atopic dermatitis usually starts showing signs in infancy and can last through adulthood. This condition cannot be passed from one person to another (non-contagious), but it is more common in families. Atopic dermatitis may not always be present. When it is present, it is called a flare-up.  What are the causes?  The exact cause of this condition is not known. Flare-ups of the condition may be triggered by:  · Contact with something that you are sensitive or allergic to.  · Stress.  · Certain foods.  · Extremely hot or cold weather.  · Harsh chemicals and soaps.  · Dry  air.  · Chlorine.  What increases the risk?  This condition is more likely to develop in people who have a personal history or family history of eczema, allergies, asthma, or hay fever.  What are the signs or symptoms?  Symptoms of this condition include:  · Dry, scaly skin.  · Red, itchy rash.  · Itchiness, which can be severe. This may occur before the skin rash. This can make sleeping difficult.  · Skin thickening and cracking that can occur over time.  How is this diagnosed?  This condition is diagnosed based on your symptoms, a medical history, and a physical exam.  How is this treated?  There is no cure for this condition, but symptoms can usually be controlled. Treatment focuses on:  · Controlling the itchiness and scratching. You may be given medicines, such as antihistamines or steroid creams.  · Limiting exposure to things that you are sensitive or allergic to (allergens).  · Recognizing situations that cause stress and developing a plan to manage stress.  If your atopic dermatitis does not get better with medicines, or if it is all over your body (widespread), a treatment using a specific type of light (phototherapy) may be used.  Follow these instructions at home:  Skin care    · Keep your skin well-moisturized. Doing this seals in moisture and helps to prevent dryness.  ? Use unscented lotions that have petroleum in them.  ? Avoid lotions that contain alcohol or water. They can dry the skin.  · Keep baths or showers short (less than 5 minutes) in warm water. Do not use hot water.  ? Use mild, unscented cleansers for bathing. Avoid soap and bubble bath.  ? Apply a moisturizer to your skin right after a bath or shower.  · Do not apply anything to your skin without checking with your health care provider.  General instructions  · Dress in clothes made of cotton or cotton blends. Dress lightly because heat increases itchiness.  · When washing your clothes, rinse your clothes twice so all of the soap is  removed.  · Avoid any triggers that can cause a flare-up.  · Try to manage your stress.  · Keep your fingernails cut short.  · Avoid scratching. Scratching makes the rash and itchiness worse. It may also result in a skin infection (impetigo) due to a break in the skin caused by scratching.  · Take or apply over-the-counter and prescription medicines only as told by your health care provider.  · Keep all follow-up visits as told by your health care provider. This is important.  · Do not be around people who have cold sores or fever blisters. If you get the infection, it may cause your atopic dermatitis to worsen.  Contact a health care provider if:  · Your itchiness interferes with sleep.  · Your rash gets worse or it is not better within one week of starting treatment.  · You have a fever.  · You have a rash flare-up after having contact with someone who has cold sores or fever blisters.  Get help right away if:  · You develop pus or soft yellow scabs in the rash area.  Summary  · This condition causes a red rash and itchy, dry, scaly skin.  · Treatment focuses on controlling the itchiness and scratching, limiting exposure to things that you are sensitive or allergic to (allergens), recognizing situations that cause stress, and developing a plan to manage stress.  · Keep your skin well-moisturized.  · Keep baths or showers shorter than 5 minutes and use warm water. Do not use hot water.  This information is not intended to replace advice given to you by your health care provider. Make sure you discuss any questions you have with your health care provider.  Document Released: 12/15/2001 Document Revised: 04/07/2020 Document Reviewed: 01/19/2018  [employer] Patient Education © 2020 [employer]

## 2020-08-07 ENCOUNTER — OFFICE VISIT (OUTPATIENT)
Dept: FAMILY MEDICINE CLINIC | Facility: CLINIC | Age: 30
End: 2020-08-07

## 2020-08-07 VITALS
HEIGHT: 62 IN | TEMPERATURE: 97.2 F | RESPIRATION RATE: 20 BRPM | DIASTOLIC BLOOD PRESSURE: 70 MMHG | SYSTOLIC BLOOD PRESSURE: 106 MMHG | WEIGHT: 159.13 LBS | BODY MASS INDEX: 29.28 KG/M2 | OXYGEN SATURATION: 98 % | HEART RATE: 70 BPM

## 2020-08-07 DIAGNOSIS — L30.9 DERMATITIS: ICD-10-CM

## 2020-08-07 DIAGNOSIS — L50.9 HIVES: Primary | ICD-10-CM

## 2020-08-07 PROCEDURE — 96372 THER/PROPH/DIAG INJ SC/IM: CPT | Performed by: NURSE PRACTITIONER

## 2020-08-07 PROCEDURE — 99213 OFFICE O/P EST LOW 20 MIN: CPT | Performed by: NURSE PRACTITIONER

## 2020-08-07 RX ORDER — TRIAMCINOLONE ACETONIDE 1 MG/G
CREAM TOPICAL 3 TIMES DAILY
Qty: 453.6 G | Refills: 0 | Status: SHIPPED | OUTPATIENT
Start: 2020-08-07 | End: 2020-08-30 | Stop reason: SDUPTHER

## 2020-08-07 RX ORDER — DEXAMETHASONE SODIUM PHOSPHATE 10 MG/ML
10 INJECTION INTRAMUSCULAR; INTRAVENOUS ONCE
Status: COMPLETED | OUTPATIENT
Start: 2020-08-07 | End: 2020-08-07

## 2020-08-07 RX ADMIN — DEXAMETHASONE SODIUM PHOSPHATE 10 MG: 10 INJECTION INTRAMUSCULAR; INTRAVENOUS at 12:04

## 2020-08-07 NOTE — PATIENT INSTRUCTIONS
Hives  Hives (urticaria) are itchy, red, swollen areas on the skin. Hives can appear on any part of the body. Hives often fade within 24 hours (acute hives). Sometimes, new hives appear after old ones fade and the cycle can continue for several days or weeks (chronic hives). Hives do not spread from person to person (are not contagious).  Hives come from the body's reaction to something a person is allergic to (allergen), something that causes irritation, or various other triggers. When a person is exposed to a trigger, his or her body releases a chemical (histamine) that causes redness, itching, and swelling. Hives can appear right after exposure to a trigger or hours later.  What are the causes?  This condition may be caused by:  · Allergies to foods or ingredients.  · Insect bites or stings.  · Exposure to pollen or pets.  · Contact with latex or chemicals.  · Spending time in sunlight, heat, or cold (exposure).  · Exercise.  · Stress.  · Certain medicines.  You can also get hives from other medical conditions and treatments, such as:  · Viruses, including the common cold.  · Bacterial infections, such as urinary tract infections and strep throat.  · Certain medicines.  · Allergy shots.  · Blood transfusions.  Sometimes, the cause of this condition is not known (idiopathic hives).  What increases the risk?  You are more likely to develop this condition if you:  · Are a woman.  · Have food allergies, especially to citrus fruits, milk, eggs, peanuts, tree nuts, or shellfish.  · Are allergic to:  ? Medicines.  ? Latex.  ? Insects.  ? Animals.  ? Pollen.  What are the signs or symptoms?  Common symptoms of this condition include raised, itchy, red or white bumps or patches on your skin. These areas may:  · Become large and swollen (welts).  · Change in shape and location, quickly and repeatedly.  · Be separate hives or connect over a large area of skin.  · Sting or become painful.  · Turn white when pressed in the  [place]  In severe cases, your hands, feet, and face may also become swollen. This may occur if hives develop deeper in your skin.  How is this diagnosed?  This condition may be diagnosed by your symptoms, medical history, and physical exam.  · Your skin, urine, or blood may be tested to find out what is causing your hives and to rule out other health issues.  · Your health care provider may also remove a small sample of skin from the affected area and examine it under a microscope (biopsy).  How is this treated?  Treatment for this condition depends on the cause and severity of your symptoms. Your health care provider may recommend using cool, wet cloths (cool compresses) or taking cool showers to relieve itching. Treatment may include:  · Medicines that help:  ? Relieve itching (antihistamines).  ? Reduce swelling (corticosteroids).  ? Treat infection (antibiotics).  · An injectable medicine (omalizumab). Your health care provider may prescribe this if you have chronic idiopathic hives and you continue to have symptoms even after treatment with antihistamines.  Severe cases may require an emergency injection of adrenaline (epinephrine) to prevent a life-threatening allergic reaction (anaphylaxis).  Follow these instructions at home:  Medicines  · Take and apply over-the-counter and prescription medicines only as told by your health care provider.  · If you were prescribed an antibiotic medicine, take it as told by your health care provider. Do not stop using the antibiotic even if you start to feel better.  Skin care  · Apply cool compresses to the affected areas.  · Do not scratch or rub your skin.  General instructions  · Do not take hot showers or baths. This can make itching worse.  · Do not wear tight-fitting clothing.  · Use sunscreen and wear protective clothing when you are outside.  · Avoid any substances that cause your hives. Keep a journal to help track what causes your hives. Write  down:  ? What medicines you take.  ? What you eat and drink.  ? What products you use on your skin.  · Keep all follow-up visits as told by your health care provider. This is important.  Contact a health care provider if:  · Your symptoms are not controlled with medicine.  · Your joints are painful or swollen.  Get help right away if:  · You have a fever.  · You have pain in your abdomen.  · Your tongue or lips are swollen.  · Your eyelids are swollen.  · Your chest or throat feels tight.  · You have trouble breathing or swallowing.  These symptoms may represent a serious problem that is an emergency. Do not wait to see if the symptoms will go away. Get medical help right away. Call your local emergency services (911 in the [place] Do not drive yourself to the hospital.  Summary  · Hives (urticaria) are itchy, red, swollen areas on your skin. Hives come from the body's reaction to something a person is allergic to (allergen), something that causes irritation, or various other triggers.  · Treatment for this condition depends on the cause and severity of your symptoms.  · Avoid any substances that cause your hives. Keep a journal to help track what causes your hives.  · Take and apply over-the-counter and prescription medicines only as told by your health care provider.  · Keep all follow-up visits as told by your health care provider. This is important.  This information is not intended to replace advice given to you by your health care provider. Make sure you discuss any questions you have with your health care provider.  Document Released: 12/18/2006 Document Revised: 07/03/2019 Document Reviewed: 07/03/2019  Elsevier Patient Education © 2020 [employer] Inc.     0.2

## 2020-08-07 NOTE — PROGRESS NOTES
"Subjective   Sofiya Almaguer is a 30 y.o. female.     [facility] Same Day Appointment    PCP: LEEANN Bennett    CC: \"rash\"    Denies history of food allergies.  No one else in family has similar.  Still having some hives/wheals that come/go (has pictures on phone).     Rash   This is a new problem. Episode onset: x 2 weeks. The problem has been gradually worsening since onset. Location: arms/legs/torso. The rash is characterized by itchiness, redness and swelling. She was exposed to a new detergent/soap (started after changing detergent to TIDE and using new body wash). Pertinent negatives include no anorexia, congestion, cough, diarrhea, eye pain, facial edema, fatigue, fever, joint pain, nail changes, rhinorrhea, shortness of breath, sore throat or vomiting. Treatments tried: Telehealth visit on 7-30 and given Clobetasol and Vistaril (out of stock at pharmacy) The treatment provided mild relief.        The following portions of the patient's history were reviewed and updated as appropriate: allergies, current medications, past medical history, past social history, past surgical history and problem list.    Review of Systems   Constitutional: Negative.  Negative for fatigue and fever.   HENT: Negative for congestion, rhinorrhea and sore throat.    Eyes: Negative for pain.   Respiratory: Negative.  Negative for cough and shortness of breath.    Cardiovascular: Negative.    Gastrointestinal: Negative for anorexia, diarrhea, nausea and vomiting.   Genitourinary: Negative for difficulty urinating.   Musculoskeletal: Negative.  Negative for joint pain.   Skin: Positive for rash. Negative for nail changes.   Neurological: Negative for dizziness and headache.     /70 (BP Location: Right arm, Patient Position: Sitting, Cuff Size: Adult)   Pulse 70   Temp 97.2 °F (36.2 °C) (Temporal)   Resp 20   Ht 156.2 cm (61.5\")   Wt 72.2 kg (159 lb 2 oz)   SpO2 98%   BMI 29.58 kg/m²     Objective   Physical Exam "   Constitutional: She is oriented to person, place, and time. She appears well-developed and well-nourished. No distress.   HENT:   Head: Normocephalic and atraumatic.   Mouth/Throat: Uvula is midline and oropharynx is clear and moist. No uvula swelling.   Cardiovascular: Normal rate and regular rhythm.   Pulmonary/Chest: Effort normal and breath sounds normal. She has no wheezes. She has no rales.   Neurological: She is alert and oriented to person, place, and time.   Skin: Rash noted. Rash is urticarial.        Nursing note and vitals reviewed.    No results found for this or any previous visit (from the past 24 hour(s)).  No Images in the past 120 days found..      Assessment/Plan   Sofiya was seen today for rash.    Diagnoses and all orders for this visit:    Hives  -     dexamethasone (DECADRON) injection 10 mg  -     triamcinolone (KENALOG) 0.1 % cream; Apply  topically to the appropriate area as directed 3 (Three) Times a Day.    Dermatitis  -     dexamethasone (DECADRON) injection 10 mg  -     triamcinolone (KENALOG) 0.1 % cream; Apply  topically to the appropriate area as directed 3 (Three) Times a Day.    Decadron 10 mg IM x 1 in office  Rx for Triamcinalone cream (out of clobetasol, only had 2 small tubes)  Continue with Benadryl for itching since Vistaril not available.  May add Pepcid OTC   Avoid getting too hot  Cool compresses as needed    If hives continue to recur after changing soaps/detergents to free/clear, may need to consider food allergy testing (started new diet 3 weeks ago due to elevated LFTs)    See PCP or RTC if symptoms persist/worsen  See PCP for routine f/u visit and management of chronic medical conditions      This document has been electronically signed by LEEANN Pineda on August 7, 2020 12:11,.

## 2020-08-12 RX ORDER — PERMETHRIN 50 MG/G
CREAM TOPICAL ONCE
Qty: 60 G | Refills: 0 | Status: SHIPPED | OUTPATIENT
Start: 2020-08-12 | End: 2020-08-12

## 2020-08-13 DIAGNOSIS — L50.9 HIVES: Primary | ICD-10-CM

## 2020-08-13 RX ORDER — PREDNISONE 5 MG/1
1 TABLET ORAL DAILY
Qty: 1 EACH | Refills: 0 | Status: SHIPPED | OUTPATIENT
Start: 2020-08-13 | End: 2020-12-28

## 2020-08-14 ENCOUNTER — LAB (OUTPATIENT)
Dept: LAB | Facility: HOSPITAL | Age: 30
End: 2020-08-14

## 2020-08-14 DIAGNOSIS — L50.9 HIVES: ICD-10-CM

## 2020-08-14 PROCEDURE — 86003 ALLG SPEC IGE CRUDE XTRC EA: CPT

## 2020-08-14 PROCEDURE — 80053 COMPREHEN METABOLIC PANEL: CPT | Performed by: NURSE PRACTITIONER

## 2020-08-18 LAB
CALIF WALNUT POLN IGE QN: <0.1 KU/L
CLAM IGE QN: <0.1 KU/L
CODFISH IGE QN: <0.1 KU/L
CONV CLASS DESCRIPTION: NORMAL
CORN IGE QN: <0.1 KU/L
COW MILK IGE QN: <0.1 KU/L
EGG WHITE IGE QN: <0.1 KU/L
PEANUT IGE QN: <0.1 KU/L
SCALLOP IGE QN: <0.1 KU/L
SESAME SEED IGE: <0.1 KU/L
SHRIMP IGE: <0.1 KU/L
SOYBEAN IGE QN: <0.1 KU/L
WHEAT IGE QN: <0.1 KU/L

## 2020-08-20 ENCOUNTER — OFFICE VISIT (OUTPATIENT)
Dept: FAMILY MEDICINE CLINIC | Facility: CLINIC | Age: 30
End: 2020-08-20

## 2020-08-20 DIAGNOSIS — L50.9 HIVES: Primary | ICD-10-CM

## 2020-08-20 PROCEDURE — 99441 PR PHYS/QHP TELEPHONE EVALUATION 5-10 MIN: CPT | Performed by: NURSE PRACTITIONER

## 2020-08-20 NOTE — PROGRESS NOTES
"Subjective   Sofiya Almaguer is a 30 y.o. female.     You have chosen to receive care through a telephone visit. Do you consent to use a telephone visit for your medical care today? Yes      PCP: LEEANN Bennett    CC: \"hives\"    Recent food allergy profile was negative.      Urticaria   This is a recurrent problem. The current episode started 1 to 4 weeks ago. The problem has been waxing and waning since onset. Location: neck, arms, chest. The rash is characterized by pain, redness, itchiness and burning. It is unknown if there was an exposure to a precipitant. Pertinent negatives include no anorexia, congestion, cough, diarrhea, eye pain, facial edema, fatigue, fever, joint pain, nail changes, rhinorrhea, shortness of breath, sore throat or vomiting. Treatments tried: Decadron IM; steroid creams; steroid kyleigh--has 4 days remaining; has hydroxyzine/benadryl, but makes her too drowsy; not taking Pepcid. The treatment provided mild (waxing/waning) relief.        The following portions of the patient's history were reviewed and updated as appropriate: allergies, current medications, past medical history, past social history, past surgical history and problem list.    Review of Systems   Constitutional: Negative.  Negative for fatigue and fever.   HENT: Negative for congestion, rhinorrhea and sore throat.    Eyes: Negative for pain.   Respiratory: Negative.  Negative for cough and shortness of breath.    Cardiovascular: Negative.    Gastrointestinal: Negative for anorexia, diarrhea, nausea and vomiting.   Genitourinary: Negative for difficulty urinating.   Musculoskeletal: Negative.  Negative for joint pain.   Skin: Positive for rash. Negative for nail changes.   Neurological: Negative for dizziness and headache.     LMP 07/17/2020   Breastfeeding No     Objective   Physical Exam   Constitutional: She is oriented to person, place, and time.   Neurological: She is alert and oriented to person, place, and time. "   Psychiatric: She has a normal mood and affect. Her behavior is normal. Thought content normal.     Recent Results (from the past 500 hour(s))   Comprehensive Metabolic Panel    Collection Time: 08/14/20  1:12 PM   Result Value Ref Range    Glucose 112 (H) 65 - 99 mg/dL    BUN 10 6 - 20 mg/dL    Creatinine 0.75 0.57 - 1.00 mg/dL    Sodium 137 136 - 145 mmol/L    Potassium 4.1 3.5 - 5.2 mmol/L    Chloride 104 98 - 107 mmol/L    CO2 20.6 (L) 22.0 - 29.0 mmol/L    Calcium 9.7 8.6 - 10.5 mg/dL    Total Protein 7.2 6.0 - 8.5 g/dL    Albumin 4.30 3.50 - 5.20 g/dL    ALT (SGPT) 40 (H) 1 - 33 U/L    AST (SGOT) 13 1 - 32 U/L    Alkaline Phosphatase 58 39 - 117 U/L    Total Bilirubin 0.2 0.0 - 1.2 mg/dL    eGFR Non African Amer 90 >60 mL/min/1.73    Globulin 2.9 gm/dL    A/G Ratio 1.5 g/dL    BUN/Creatinine Ratio 13.3 7.0 - 25.0    Anion Gap 12.4 5.0 - 15.0 mmol/L   Food Allergy Profile    Collection Time: 08/14/20  1:12 PM   Result Value Ref Range    Class Description Comment     Egg White <0.10 Class 0 kU/L    Peanut <0.10 Class 0 kU/L    Soybean <0.10 Class 0 kU/L    Milk, Cow's <0.10 Class 0 kU/L    Clams <0.10 Class 0 kU/L    Shrimp <0.10 Class 0 kU/L    Gueydan <0.10 Class 0 kU/L    CodFish <0.10 Class 0 kU/L    Scallop <0.10 Class 0 kU/L    Wheat <0.10 Class 0 kU/L    Corn <0.10 Class 0 kU/L    Sesame Seed <0.10 Class 0 kU/L     No Images in the past 120 days found..      Assessment/Plan   Sofiya was seen today for rash.    Diagnoses and all orders for this visit:    Hives  -     Cancel: Ambulatory Referral to Allergy  -     Ambulatory Referral to Allergy      Finish Prednisone  Continue with Kenalog cream as needed  Pepcid OTC BID  Start Zyrtec in the AM, Vistaril or Benadryl at bedtime  Cool compresses as needed  Referral to allergist    See PCP for routine f/u visit and management of chronic medical conditions      This document has been electronically signed by LEEANN Pineda on August 20, 2020  16:58,.    This visit has been rescheduled as a phone visit to comply with patient safety concerns in accordance with CDC recommendations. Total time of discussion was 7 minutes.

## 2020-08-27 DIAGNOSIS — R42 DIZZINESS: ICD-10-CM

## 2020-08-27 DIAGNOSIS — L50.9 HIVES: Primary | ICD-10-CM

## 2020-08-28 ENCOUNTER — LAB (OUTPATIENT)
Dept: LAB | Facility: HOSPITAL | Age: 30
End: 2020-08-28

## 2020-08-28 DIAGNOSIS — R42 DIZZINESS: ICD-10-CM

## 2020-08-28 DIAGNOSIS — L50.9 HIVES: ICD-10-CM

## 2020-08-28 PROCEDURE — 80053 COMPREHEN METABOLIC PANEL: CPT

## 2020-08-28 PROCEDURE — 85025 COMPLETE CBC W/AUTO DIFF WBC: CPT

## 2020-08-28 PROCEDURE — 83520 IMMUNOASSAY QUANT NOS NONAB: CPT

## 2020-08-29 LAB
ALBUMIN SERPL-MCNC: 4.2 G/DL (ref 3.5–5.2)
ALBUMIN/GLOB SERPL: 1.5 G/DL
ALP SERPL-CCNC: 57 U/L (ref 39–117)
ALT SERPL W P-5'-P-CCNC: 45 U/L (ref 1–33)
ANION GAP SERPL CALCULATED.3IONS-SCNC: 9.8 MMOL/L (ref 5–15)
AST SERPL-CCNC: 28 U/L (ref 1–32)
BASOPHILS # BLD AUTO: 0.06 10*3/MM3 (ref 0–0.2)
BASOPHILS NFR BLD AUTO: 0.9 % (ref 0–1.5)
BILIRUB SERPL-MCNC: 0.5 MG/DL (ref 0–1.2)
BUN SERPL-MCNC: 9 MG/DL (ref 6–20)
BUN/CREAT SERPL: 10.5 (ref 7–25)
CALCIUM SPEC-SCNC: 9.6 MG/DL (ref 8.6–10.5)
CHLORIDE SERPL-SCNC: 105 MMOL/L (ref 98–107)
CO2 SERPL-SCNC: 26.2 MMOL/L (ref 22–29)
CREAT SERPL-MCNC: 0.86 MG/DL (ref 0.57–1)
DEPRECATED RDW RBC AUTO: 40.8 FL (ref 37–54)
EOSINOPHIL # BLD AUTO: 0.24 10*3/MM3 (ref 0–0.4)
EOSINOPHIL NFR BLD AUTO: 3.6 % (ref 0.3–6.2)
ERYTHROCYTE [DISTWIDTH] IN BLOOD BY AUTOMATED COUNT: 13.1 % (ref 12.3–15.4)
GFR SERPL CREATININE-BSD FRML MDRD: 76 ML/MIN/1.73
GLOBULIN UR ELPH-MCNC: 2.8 GM/DL
GLUCOSE SERPL-MCNC: 73 MG/DL (ref 65–99)
HCT VFR BLD AUTO: 43.4 % (ref 34–46.6)
HGB BLD-MCNC: 15.1 G/DL (ref 12–15.9)
IMM GRANULOCYTES # BLD AUTO: 0.01 10*3/MM3 (ref 0–0.05)
IMM GRANULOCYTES NFR BLD AUTO: 0.1 % (ref 0–0.5)
LYMPHOCYTES # BLD AUTO: 1.66 10*3/MM3 (ref 0.7–3.1)
LYMPHOCYTES NFR BLD AUTO: 24.9 % (ref 19.6–45.3)
MCH RBC QN AUTO: 29.9 PG (ref 26.6–33)
MCHC RBC AUTO-ENTMCNC: 34.8 G/DL (ref 31.5–35.7)
MCV RBC AUTO: 85.9 FL (ref 79–97)
MONOCYTES # BLD AUTO: 0.5 10*3/MM3 (ref 0.1–0.9)
MONOCYTES NFR BLD AUTO: 7.5 % (ref 5–12)
NEUTROPHILS NFR BLD AUTO: 4.21 10*3/MM3 (ref 1.7–7)
NEUTROPHILS NFR BLD AUTO: 63 % (ref 42.7–76)
NRBC BLD AUTO-RTO: 0 /100 WBC (ref 0–0.2)
PLATELET # BLD AUTO: 267 10*3/MM3 (ref 140–450)
PMV BLD AUTO: 11.1 FL (ref 6–12)
POTASSIUM SERPL-SCNC: 4.4 MMOL/L (ref 3.5–5.2)
PROT SERPL-MCNC: 7 G/DL (ref 6–8.5)
RBC # BLD AUTO: 5.05 10*6/MM3 (ref 3.77–5.28)
SODIUM SERPL-SCNC: 141 MMOL/L (ref 136–145)
WBC # BLD AUTO: 6.68 10*3/MM3 (ref 3.4–10.8)

## 2020-08-30 DIAGNOSIS — L30.9 DERMATITIS: ICD-10-CM

## 2020-08-30 DIAGNOSIS — L50.9 HIVES: ICD-10-CM

## 2020-08-31 RX ORDER — TRIAMCINOLONE ACETONIDE 1 MG/G
CREAM TOPICAL 3 TIMES DAILY
Qty: 453.6 G | Refills: 0 | Status: SHIPPED | OUTPATIENT
Start: 2020-08-31 | End: 2020-12-28

## 2020-09-01 LAB — TRYPTASE SERPL-MCNC: 4.3 UG/L (ref 2.2–13.2)

## 2020-12-28 ENCOUNTER — TELEMEDICINE (OUTPATIENT)
Dept: FAMILY MEDICINE CLINIC | Facility: TELEHEALTH | Age: 30
End: 2020-12-28

## 2020-12-28 DIAGNOSIS — B00.1 FEVER BLISTER: Primary | ICD-10-CM

## 2020-12-28 PROCEDURE — 99213 OFFICE O/P EST LOW 20 MIN: CPT | Performed by: NURSE PRACTITIONER

## 2020-12-28 RX ORDER — VALACYCLOVIR HYDROCHLORIDE 1 G/1
1000 TABLET, FILM COATED ORAL 2 TIMES DAILY
Qty: 10 TABLET | Refills: 1 | Status: SHIPPED | OUTPATIENT
Start: 2020-12-28 | End: 2021-01-02

## 2020-12-28 NOTE — PROGRESS NOTES
CHIEF COMPLAINT  Chief Complaint   Patient presents with   • Mouth Lesions         HPI  Sofiya Almaguer is a 30 y.o. female  presents with complaint of 1 day history of fever blister on left lower lip.  Worse today, is being irritated by mask that she has to wear while at work.  Has been applying Carmex lip balm and camphophenique.  Unable to use abreva    Review of Systems   HENT: Positive for mouth sores.    All other systems reviewed and are negative.      Past Medical History:   Diagnosis Date   • Abnormal liver function    • Abnormal Pap smear of cervix    • Abnormal weight gain    • Acne    • Cervical intraepithelial neoplasia grade 2     h/o   • Contraception    • Hyperlipidemia    • Vitamin D deficiency        Family History   Problem Relation Age of Onset   • Bipolar disorder Mother    • Depression Mother         depressive disorder   • Diabetes Mother    • Hyperlipidemia Mother    • Hypertension Mother    • Breast cancer Other    • Colon cancer Other    • Diabetes Maternal Grandmother    • Hypertension Maternal Grandmother    • Colon cancer Maternal Grandfather        Social History     Socioeconomic History   • Marital status: Single     Spouse name: Not on file   • Number of children: Not on file   • Years of education: Not on file   • Highest education level: Not on file   Tobacco Use   • Smoking status: Never Smoker   • Smokeless tobacco: Never Used   Substance and Sexual Activity   • Alcohol use: No   • Drug use: No   • Sexual activity: Yes     Partners: Male     Birth control/protection: Implant         There were no vitals taken for this visit.    PHYSICAL EXAM  Physical Exam   Constitutional: She is oriented to person, place, and time. She appears well-developed and well-nourished.   HENT:   Head: Normocephalic and atraumatic.   Mouth/Throat: Oral lesions present.       Neurological: She is alert and oriented to person, place, and time.         Diagnoses and all orders for this visit:    1.  Fever blister (Primary)  -     valACYclovir (VALTREX) 1000 MG tablet; Take 1 tablet by mouth 2 (Two) Times a Day for 5 days.  Dispense: 10 tablet; Refill: 1  --take Valtrex until fever blister has resolved  --other instructions included in patient instructions sent to [facility]      FOLLOW-UP  As discussed during visit with PCP/[facility] if no improvement or Urgent Care/Emergency Department if worsening of symptoms    Patient verbalizes understanding of medication dosage, comfort measures, instructions for treatment and follow-up.    Mandy Fritz, APRN  12/28/2020  12:50 EST    This visit was performed via Telehealth.  This patient has been instructed to follow-up with their primary care provider if their symptoms worsen or the treatment provided does not resolve their illness.

## 2020-12-28 NOTE — PATIENT INSTRUCTIONS
Cold Sore    A cold sore, also called a fever blister, is a small, fluid-filled sore that forms inside the mouth or on the lips, gums, nose, chin, or cheeks. Cold sores can spread to other parts of the body, such as the eyes or fingers. In some people who have other medical conditions, cold sores can spread to multiple other body sites, including the genitals.  Cold sores can spread from person to person (are contagious) until the sores crust over completely. Most cold sores go away within 2 weeks.  What are the causes?  Cold sores are caused by an infection from a common type of herpes simplex virus (HSV-1). HSV-1 is closely related to the HSV-2virus, which is the virus that causes genital herpes, but these viruses are not the same. Once a person is infected with HSV-1, the virus remains permanently in the body.  HSV-1 is spread from person to person through close contact, such as through kissing, touching the affected area, or sharing personal items such as lip balm, razors, a drinking glass, or eating utensils.  What increases the risk?  You are more likely to develop this condition if you:  · Are tired, stressed, or sick.  · Are menstruating.  · Are pregnant.  · Take certain medicines.  · Are exposed to cold weather or too much sun.  What are the signs or symptoms?  Symptoms of a cold sore outbreak go through different stages. These are the stages of a cold sore:  · Tingling, itching, or burning is felt 1-2 days before the outbreak.  · Fluid-filled blisters appear on the lips, inside the mouth, on the nose, or on the cheeks.  · The blisters start to ooze clear fluid.  · The blisters dry up, and a yellow crust appears in their place.  · The crust falls off.  In some cases, other symptoms can develop during a cold sore outbreak. These can include:  · Fever.  · Sore throat.  · Headache.  · Muscle aches.  · Swollen neck glands.  How is this diagnosed?  This condition is diagnosed based on your medical history and a  physical exam. Your health care provider may do a blood test or may swab some fluid from your sore and then examine the swab in the lab.  How is this treated?  There is no cure for cold sores or HSV-1. There is also no vaccine for HSV-1. Most cold sores go away on their own without treatment within 2 weeks. Medicines cannot make the infection go away, but your health care provider may prescribe medicines to:  · Help relieve some of the pain associated with the sores.  · Work to stop the virus from multiplying.  · Shorten healing time.  Medicines may be in the form of creams, gels, pills, or a shot.  Follow these instructions at home:  Medicines  · Take or apply over-the-counter and prescription medicines only as told by your health care provider.  · Use a cotton-tip swab to apply creams or gels to your sores.  · Ask your health care provider if you can take lysine supplements. Research has found that lysine may help heal the cold sore faster and prevent outbreaks.  Sore care    · Do not touch the sores or pick the scabs.  · Wash your hands often. Do not touch your eyes without washing your hands first.  · Keep the sores clean and dry.  · If directed, apply ice to the sores:  ? Put ice in a plastic bag.  ? Place a towel between your skin and the bag.  ? Leave the ice on for 20 minutes, 2-3 times a day.  Eating and drinking  · Eat a soft, bland diet. Avoid eating hot, cold, or salty foods.  · Use a straw if it hurts to drink out of a glass.  · Eat foods that are rich in lysine, such as meat, fish, and dairy products.  · Avoid sugary foods, chocolates, nuts, and grains. These foods are rich in a nutrient called arginine, which can cause the virus to multiply.  Lifestyle  · Do not kiss, have oral sex, or share personal items until your sores heal.  · Stress, poor sleep, and being out in the sun can trigger outbreaks. Make sure you:  ? Do activities that help you relax, such as deep breathing exercises or  meditation.  ? Get enough sleep.  ? Apply sunscreen on your lips before you go out in the sun.  Contact a health care provider if:  · You have symptoms for more than 2 weeks.  · You have pus coming from the sores.  · You have redness that is spreading.  · You have pain or irritation in your eye.  · You get sores on your genitals.  · Your sores do not heal within 2 weeks.  · You have frequent cold sore outbreaks.  Get help right away if you have:  · A fever and your symptoms suddenly get worse.  · A headache and confusion.  · Fatigue or loss of appetite.  · A stiff neck or sensitivity to light.  Summary  · A cold sore, also called a fever blister, is a small, fluid-filled sore that forms inside the mouth or on the lips, gums, nose, chin, or cheeks.  · Most cold sores go away on their own without treatment within 2 weeks. Your health care provider may prescribe medicines to help relieve some of the pain, work to stop the virus from multiplying, and shorten healing time.  · Wash your hands often. Do not touch your eyes without washing your hands first.  · Do not kiss, have oral sex, or share personal items until your sores heal.  · Contact a health care provider if your sores do not heal within 2 weeks.  This information is not intended to replace advice given to you by your health care provider. Make sure you discuss any questions you have with your health care provider.  Document Revised: 04/08/2020 Document Reviewed: 05/20/2019  Elsevier Patient Education © 2020 [employer]

## 2021-01-06 ENCOUNTER — OFFICE VISIT (OUTPATIENT)
Dept: OTOLARYNGOLOGY | Facility: CLINIC | Age: 31
End: 2021-01-06

## 2021-01-06 ENCOUNTER — CLINICAL SUPPORT (OUTPATIENT)
Dept: AUDIOLOGY | Facility: CLINIC | Age: 31
End: 2021-01-06

## 2021-01-06 VITALS — WEIGHT: 159 LBS | BODY MASS INDEX: 29.26 KG/M2 | HEIGHT: 62 IN | TEMPERATURE: 97.2 F

## 2021-01-06 DIAGNOSIS — Z86.69 HISTORY OF OTITIS MEDIA: Primary | ICD-10-CM

## 2021-01-06 DIAGNOSIS — Z01.10 ENCOUNTER FOR EXAMINATION OF HEARING WITHOUT ABNORMAL FINDINGS: ICD-10-CM

## 2021-01-06 DIAGNOSIS — H68.003 CATARRH OF BOTH EUSTACHIAN TUBES: Primary | ICD-10-CM

## 2021-01-06 PROCEDURE — 92557 COMPREHENSIVE HEARING TEST: CPT | Performed by: AUDIOLOGIST

## 2021-01-06 PROCEDURE — 99203 OFFICE O/P NEW LOW 30 MIN: CPT | Performed by: OTOLARYNGOLOGY

## 2021-01-06 PROCEDURE — 92567 TYMPANOMETRY: CPT | Performed by: AUDIOLOGIST

## 2021-01-06 RX ORDER — ACETAMINOPHEN, ASPIRIN AND CAFFEINE 250; 250; 65 MG/1; MG/1; MG/1
1 TABLET, FILM COATED ORAL EVERY 6 HOURS PRN
COMMUNITY
End: 2022-11-09

## 2021-01-06 RX ORDER — AZELASTINE 1 MG/ML
2 SPRAY, METERED NASAL 2 TIMES DAILY
Qty: 30 ML | Refills: 11 | Status: SHIPPED | OUTPATIENT
Start: 2021-01-06 | End: 2022-02-23

## 2021-01-06 RX ORDER — MOMETASONE FUROATE 50 UG/1
2 SPRAY, METERED NASAL 2 TIMES DAILY
Qty: 17 G | Refills: 11 | Status: SHIPPED | OUTPATIENT
Start: 2021-01-06 | End: 2022-02-23

## 2021-01-06 NOTE — PROGRESS NOTES
Subjective   Sofiya Almaguer is a 30 y.o. female.   Problem with ears  History of Present Illness   Patient is longstanding ear problems seen after diving Dr. Islas in the past is referred because of popping pressure and fullness and loses her hearing is happened off and on through the years she had last tubes in   1999  She has intermittent hearing loss sometimes pain when she flies she has difficulty not having a lot of allergy  problems  No ear drainage says her eardrums ruptured the past    The following portions of the patient's history were reviewed and updated as appropriate: allergies, current medications, past family history, past medical history, past social history, past surgical history and problem list.      Sofiya Almaguer reports that she has never smoked. She has never used smokeless tobacco. She reports that she does not drink alcohol or use drugs.  Patient is not a tobacco user and has been counseled for use of tobacco products    Family History   Problem Relation Age of Onset   • Bipolar disorder Mother    • Depression Mother         depressive disorder   • Diabetes Mother    • Hyperlipidemia Mother    • Hypertension Mother    • Asthma Mother    • Breast cancer Other    • Colon cancer Other    • Diabetes Maternal Grandmother    • Hypertension Maternal Grandmother    • Fibromyalgia Maternal Grandmother    • Colon cancer Maternal Grandfather    • Diabetes Maternal Grandfather    • Alcohol abuse Father    • Cancer Sister    • Autism Brother    • Liver disease Paternal Grandmother    • No Known Problems Son    • No Known Problems Son    • No Known Problems Son    • No Known Problems Daughter    • No Known Problems Daughter          Current Outpatient Medications:   •  clindamycin-benzoyl peroxide (BENZACLIN) 1-5 % gel, Apply  topically to the appropriate area as directed 2 (Two) Times a Day. (Patient taking differently: Apply  topically to the appropriate area as directed 2 (Two) Times a  Day As Needed.), Disp: 50 g, Rfl: 11  •  etonogestrel-ethinyl estradiol (NUVARING) 0.12-0.015 MG/24HR vaginal ring, Insert 1 each into the vagina Every 28 (Twenty-Eight) Days. Insert vaginally  for 3 consecutive weeks, then remove for 1 week., Disp: 3 each, Rfl: 4  •  Multiple Vitamin (MULTI VITAMIN PO), Take  by mouth., Disp: , Rfl:   •  aspirin-acetaminophen-caffeine (EXCEDRIN MIGRAINE) 250-250-65 MG per tablet, Take 1 tablet by mouth Every 6 (Six) Hours As Needed for Headache., Disp: , Rfl:   •  azelastine (ASTELIN) 0.1 % nasal spray, 2 sprays into the nostril(s) as directed by provider 2 (Two) Times a Day. Use in each nostril as directed, Disp: 30 mL, Rfl: 11  •  diclofenac (FLECTOR) 1.3 % patch patch, Apply 1 patch topically to the appropriate area as directed 2 (Two) Times a Day., Disp: 60 patch, Rfl: 2  •  ibuprofen (ADVIL,MOTRIN) 800 MG tablet, Take 1 tablet by mouth Every 8 (Eight) Hours As Needed for Mild Pain ., Disp: 90 tablet, Rfl: 5  •  mometasone (NASONEX) 50 MCG/ACT nasal spray, 2 sprays into the nostril(s) as directed by provider 2 (Two) Times a Day., Disp: 17 g, Rfl: 11    No Known Allergies    Past Medical History:   Diagnosis Date   • Abnormal liver function    • Abnormal Pap smear of cervix    • Abnormal weight gain    • Acne    • Cervical intraepithelial neoplasia grade 2     h/o   • Contraception    • Ear infection     bilateral   • Headache    • Hyperlipidemia    • Vitamin D deficiency        Past Surgical History:   Procedure Laterality Date   • ADENOIDECTOMY     • CERVICAL BIOPSY  W/ LOOP ELECTRODE EXCISION     • INCISION AND DRAINAGE ABSCESS  05/30/2012    simple I&D   • LEEP  03/22/2007    LEEP cone biopsy. moderate cervical dysplasia   • OTHER SURGICAL HISTORY  11/30/2011    remove impacted cerumen   • TONSILLECTOMY     • WISDOM TOOTH EXTRACTION      only had 1 removed       Review of Systems   Constitutional: Negative for fever.   HENT: Positive for ear pain and hearing loss. Negative  for ear discharge, sinus pressure and sinus pain.    Hematological: Negative for adenopathy.   All other systems reviewed and are negative.          Objective   Physical Exam  Vitals signs and nursing note reviewed.   HENT:      Head: Atraumatic.      Salivary Glands: Right salivary gland is not diffusely enlarged or tender. Left salivary gland is not diffusely enlarged or tender.      Right Ear: Ear canal normal.      Ears:      Comments: Left cerumen impaction bilateral myringosclerosis of TMs no evidence of fluid     Nose: Congestion present.      Mouth/Throat:      Pharynx: Oropharynx is clear.     Eyes:      Conjunctiva/sclera: Conjunctivae normal.   Pulmonary:      Effort: Pulmonary effort is normal.   Skin:     General: Skin is warm.   Neurological:      General: No focal deficit present.      Mental Status: She is alert.   Psychiatric:         Mood and Affect: Mood normal.           Audiogram was reviewed with the patient showing minimal high-frequency sensorineural hearing loss and normal tympanograms today  Tracings were reviewed with her  Assessment/Plan   Diagnoses and all orders for this visit:    1. Catarrh of both eustachian tubes (Primary)    Other orders  -     azelastine (ASTELIN) 0.1 % nasal spray; 2 sprays into the nostril(s) as directed by provider 2 (Two) Times a Day. Use in each nostril as directed  Dispense: 30 mL; Refill: 11  -     mometasone (NASONEX) 50 MCG/ACT nasal spray; 2 sprays into the nostril(s) as directed by provider 2 (Two) Times a Day.  Dispense: 17 g; Refill: 11    Been on Flonase without success in the past.    We discussed various options I placed her on combination nasal sprays not using Flonase which she not respond to previously we talked about tube placement and oral steroids   we are trying to do the most conservative approach to help her symptoms prevent problems talked about what to do when she flies    She is to follow-up with Dr. Islas in 4 weeks and she will use the  sprays consistently then can potentially taper off 1 or both of the sprays with time

## 2021-01-06 NOTE — PATIENT INSTRUCTIONS
MyPlate from USDA    MyPlate is an outline of a general healthy diet based on the 2010 Dietary Guidelines for Americans, from the [place] Department of Agriculture (USDA). It sets guidelines for how much food you should eat from each food group based on your age, sex, and level of physical activity.  What are tips for following MyPlate?  To follow MyPlate recommendations:  · Eat a wide variety of fruits and vegetables, grains, and protein foods.  · Serve smaller portions and eat less food throughout the day.  · Limit portion sizes to avoid overeating.  · Enjoy your food.  · Get at least 150 minutes of exercise every week. This is about 30 minutes each day, 5 or more days per week.  It can be difficult to have every meal look like MyPlate. Think about MyPlate as eating guidelines for an entire day, rather than each individual meal.  Fruits and vegetables  · Make half of your plate fruits and vegetables.  · Eat many different colors of fruits and vegetables each day.  · For a 2,000 calorie daily food plan, eat:  ? 2½ cups of vegetables every day.  ? 2 cups of fruit every day.  · 1 cup is equal to:  ? 1 cup raw or cooked vegetables.  ? 1 cup raw fruit.  ? 1 medium-sized orange, apple, or banana.  ? 1 cup 100% fruit or vegetable juice.  ? 2 cups raw leafy greens, such as lettuce, spinach, or kale.  ? ½ cup dried fruit.  Grains  · One fourth of your plate should be grains.  · Make at least half of the grains you eat each day whole grains.  · For a 2,000 calorie daily food plan, eat 6 oz of grains every day.  · 1 oz is equal to:  ? 1 slice bread.  ? 1 cup cereal.  ? ½ cup cooked rice, cereal, or pasta.  Protein  · One fourth of your plate should be protein.  · Eat a wide variety of protein foods, including meat, poultry, fish, eggs, beans, nuts, and tofu.  · For a 2,000 calorie daily food plan, eat 5½ oz of protein every day.  · 1 oz is equal to:  ? 1 oz meat, poultry, or fish.  ? ¼ cup cooked beans.  ? 1 egg.  ? ½ oz nuts  or seeds.  ? 1 Tbsp peanut butter.  Dairy  · Drink fat-free or low-fat (1%) milk.  · Eat or drink dairy as a side to meals.  · For a 2,000 calorie daily food plan, eat or drink 3 cups of dairy every day.  · 1 cup is equal to:  ? 1 cup milk, yogurt, cottage cheese, or soy milk (soy beverage).  ? 2 oz processed cheese.  ? 1½ oz natural cheese.  Fats, oils, salt, and sugars  · Only small amounts of oils are recommended.  · Avoid foods that are high in calories and low in nutritional value (empty calories), like foods high in fat or added sugars.  · Choose foods that are low in salt (sodium). Choose foods that have less than 140 milligrams (mg) of sodium per serving.  · Drink water instead of sugary drinks. Drink enough water each day to keep your urine pale yellow.  Where to find support  · Work with your health care provider or a nutrition specialist (dietitian) to develop a customized eating plan that is right for you.  · Download an raya (mobile application) to help you track your daily food intake.  Where to find more information  · Go to ChooseMyPlate.gov for more information.  Summary  · MyPlate is a general guideline for healthy eating from the USDA. It is based on the 2010 Dietary Guidelines for Americans.  · In general, fruits and vegetables should take up ½ of your plate, grains should take up ¼ of your plate, and protein should take up ¼ of your plate.  This information is not intended to replace advice given to you by your health care provider. Make sure you discuss any questions you have with your health care provider.  Document Revised: 05/21/2020 Document Reviewed: 03/19/2018  Elsevier Patient Education © 2020 [employer] Inc.

## 2021-01-06 NOTE — PROGRESS NOTES
STANDARD AUDIOMETRIC EVALUATION      Name:  Sofiya Almaguer  :  1990  Age:  30 y.o.  Date of Evaluation:  2021      HISTORY    Reason for visit:  Sofiya Almaguer is seen today for a hearing test at the request of Dr. Zana Smith.  Patient reports whenever she gets a sinus infection, she has hearing loss.  She reports a positive history of tubes in her ears in 1999.  She states today she is not having any problems with her ears.       EVALUATION    See Audiogram    RESULTS        Otoscopy and Tympanometry 226 Hz :  Right Ear:  Otoscopy:  Testing completed after ears were examined by the ENT physician          Tympanometry:  Middle ear function within normal limits    Left Ear:   Otoscopy:  Testing completed after ears were examined by the ENT physician        Tympanometry:  Middle ear function within normal limits    Test technique:  Standard Audiometry     Pure Tone Audiometry:   Patient responded to pure tones at 5-25 dB for 250-8000 Hz in right ear, and at 5-15 dB for 250-8000 Hz in left ear.       Speech Audiometry:        Right Ear:  Speech Reception Threshold (SRT) was obtained at 10 dBHL                 Speech Discrimination scores were 96% in quiet when words were presented at 50 dBHL       Left Ear:  Speech Reception Threshold (SRT) was obtained at 10 dBHL                 Speech Discrimination scores were 96% in quiet when words were presented at 50 dBHL    Reliability:   good    IMPRESSIONS:  1.  Tympanometry results are consistent with Middle ear function within normal limits in both ears.  2.  Pure tone results are consistent with hearing sensitivity within normal limits for both ears.       RECOMMENDATIONS:  Patient is seeing the Ear Nose and Throat physician immediately following this examination.  It was a pleasure seeing Sofiya Almaguer in Audiology today.  We would be happy to do further testing or discuss these test as necessary.          This document has  been electronically signed by Jazmin Auguste MS CCC-A on January 6, 2021 13:17 CST       Jazmin Auguste MS CCC-A  Licensed Audiologist

## 2021-01-12 ENCOUNTER — OFFICE VISIT (OUTPATIENT)
Dept: FAMILY MEDICINE CLINIC | Facility: CLINIC | Age: 31
End: 2021-01-12

## 2021-01-12 VITALS
SYSTOLIC BLOOD PRESSURE: 100 MMHG | TEMPERATURE: 96.2 F | RESPIRATION RATE: 20 BRPM | HEIGHT: 62 IN | BODY MASS INDEX: 29.33 KG/M2 | HEART RATE: 67 BPM | DIASTOLIC BLOOD PRESSURE: 60 MMHG | WEIGHT: 159.4 LBS | OXYGEN SATURATION: 97 %

## 2021-01-12 DIAGNOSIS — Z13.29 SCREENING FOR THYROID DISORDER: ICD-10-CM

## 2021-01-12 DIAGNOSIS — Z13.1 SCREENING FOR DIABETES MELLITUS: ICD-10-CM

## 2021-01-12 DIAGNOSIS — Z00.00 WELL ADULT EXAM: Primary | ICD-10-CM

## 2021-01-12 DIAGNOSIS — Z13.220 SCREENING FOR LIPOID DISORDERS: ICD-10-CM

## 2021-01-12 DIAGNOSIS — M54.50 CHRONIC BILATERAL LOW BACK PAIN WITHOUT SCIATICA: ICD-10-CM

## 2021-01-12 DIAGNOSIS — G89.29 CHRONIC BILATERAL LOW BACK PAIN WITHOUT SCIATICA: ICD-10-CM

## 2021-01-12 PROCEDURE — 99395 PREV VISIT EST AGE 18-39: CPT | Performed by: NURSE PRACTITIONER

## 2021-01-12 RX ORDER — IBUPROFEN 800 MG/1
800 TABLET ORAL EVERY 8 HOURS PRN
Qty: 90 TABLET | Refills: 11 | Status: SHIPPED | OUTPATIENT
Start: 2021-01-12 | End: 2022-02-23 | Stop reason: SDUPTHER

## 2021-01-12 NOTE — PATIENT INSTRUCTIONS
Preventive Care 21-39 Years Old, Female  Preventive care refers to visits with your health care provider and lifestyle choices that can promote health and wellness. This includes:  · A yearly physical exam. This may also be called an annual well check.  · Regular dental visits and eye exams.  · Immunizations.  · Screening for certain conditions.  · Healthy lifestyle choices, such as eating a healthy diet, getting regular exercise, not using drugs or products that contain nicotine and tobacco, and limiting alcohol use.  What can I expect for my preventive care visit?  Physical exam  Your health care provider will check your:  · Height and weight. This may be used to calculate body mass index (BMI), which tells if you are at a healthy weight.  · Heart rate and blood pressure.  · Skin for abnormal spots.  Counseling  Your health care provider may ask you questions about your:  · Alcohol, tobacco, and drug use.  · Emotional well-being.  · Home and relationship well-being.  · Sexual activity.  · Eating habits.  · Work and work environment.  · Method of birth control.  · Menstrual cycle.  · Pregnancy history.  What immunizations do I need?    Influenza (flu) vaccine  · This is recommended every year.  Tetanus, diphtheria, and pertussis (Tdap) vaccine  · You may need a Td booster every 10 years.  Varicella (chickenpox) vaccine  · You may need this if you have not been vaccinated.  Human papillomavirus (HPV) vaccine  · If recommended by your health care provider, you may need three doses over 6 months.  Measles, mumps, and rubella (MMR) vaccine  · You may need at least one dose of MMR. You may also need a second dose.  Meningococcal conjugate (MenACWY) vaccine  · One dose is recommended if you are age 19-21 years and a first-year college student living in a residence nails, or if you have one of several medical conditions. You may also need additional booster doses.  Pneumococcal conjugate (PCV13) vaccine  · You may need  this if you have certain conditions and were not previously vaccinated.  Pneumococcal polysaccharide (PPSV23) vaccine  · You may need one or two doses if you smoke cigarettes or if you have certain conditions.  Hepatitis A vaccine  · You may need this if you have certain conditions or if you travel or work in places where you may be exposed to hepatitis A.  Hepatitis B vaccine  · You may need this if you have certain conditions or if you travel or work in places where you may be exposed to hepatitis B.  Haemophilus influenzae type b (Hib) vaccine  · You may need this if you have certain conditions.  You may receive vaccines as individual doses or as more than one vaccine together in one shot (combination vaccines). Talk with your health care provider about the risks and benefits of combination vaccines.  What tests do I need?    Blood tests  · Lipid and cholesterol levels. These may be checked every 5 years starting at age 20.  · Hepatitis C test.  · Hepatitis B test.  Screening  · Diabetes screening. This is done by checking your blood sugar (glucose) after you have not eaten for a while (fasting).  · Sexually transmitted disease (STD) testing.  · BRCA-related cancer screening. This may be done if you have a family history of breast, ovarian, tubal, or peritoneal cancers.  · Pelvic exam and Pap test. This may be done every 3 years starting at age 21. Starting at age 30, this may be done every 5 years if you have a Pap test in combination with an HPV test.  Talk with your health care provider about your test results, treatment options, and if necessary, the need for more tests.  Follow these instructions at home:  Eating and drinking    · Eat a diet that includes fresh fruits and vegetables, whole grains, lean protein, and low-fat dairy.  · Take vitamin and mineral supplements as recommended by your health care provider.  · Do not drink alcohol if:  ? Your health care provider tells you not to drink.  ? You are  pregnant, may be pregnant, or are planning to become pregnant.  · If you drink alcohol:  ? Limit how much you have to 0-1 drink a day.  ? Be aware of how much alcohol is in your drink. In the [place] one drink equals one 12 oz bottle of beer (355 mL), one 5 oz glass of wine (148 mL), or one 1½ oz glass of hard liquor (44 mL).  Lifestyle  · Take daily care of your teeth and gums.  · Stay active. Exercise for at least 30 minutes on 5 or more days each week.  · Do not use any products that contain nicotine or tobacco, such as cigarettes, e-cigarettes, and chewing tobacco. If you need help quitting, ask your health care provider.  · If you are sexually active, practice safe sex. Use a condom or other form of birth control (contraception) in order to prevent pregnancy and STIs (sexually transmitted infections). If you plan to become pregnant, see your health care provider for a preconception visit.  What's next?  · Visit your health care provider once a year for a well check visit.  · Ask your health care provider how often you should have your eyes and teeth checked.  · Stay up to date on all vaccines.  This information is not intended to replace advice given to you by your health care provider. Make sure you discuss any questions you have with your health care provider.  Document Revised: 08/29/2019 Document Reviewed: 08/29/2019  [employer] Patient Education © 2020 [employer]    Nonalcoholic Fatty Liver Disease Diet, Adult  Nonalcoholic fatty liver disease is a condition that causes fat to build up in and around the liver. The disease makes it harder for the liver to work the way that it should. Following a healthy diet can help to keep nonalcoholic fatty liver disease under control. It can also help to prevent or improve conditions that are associated with the disease, such as heart disease, diabetes, high blood pressure, and abnormal cholesterol levels.  Along with regular exercise, this diet:  · Promotes weight  loss.  · Helps to control blood sugar levels.  · Helps to improve the way that the body uses insulin.  What are tips for following this plan?  Reading food labels  Always check food labels for:  · The amount of saturated fat in a food. You should limit your intake of saturated fat. Saturated fat is found in foods that come from animals, including meat and dairy products such as butter, cheese, and whole milk.  · The amount of fiber in a food. You should choose high-fiber foods such as fruits, vegetables, and whole grains. Try to get 25-30 grams (g) of fiber a day.    Cooking  · When cooking, use heart-healthy oils that are high in monounsaturated fats. These include olive oil, canola oil, and avocado oil.  · Limit frying or deep-frying foods. Cook foods using healthy methods such as baking, boiling, steaming, and grilling instead.  Meal planning  · You may want to keep track of how many calories you take in. Eating the right amount of calories will help you achieve a healthy weight. Meeting with a registered dietitian can help you get started.  · Limit how often you eat takeout and fast food. These foods are usually very high in fat, salt, and sugar.  · Use the glycemic index (GI) to plan your meals. The index tells you how quickly a food will raise your blood sugar. Choose low-GI foods (GI less than 55). These foods take a longer time to raise blood sugar. A registered dietitian can help you identify foods lower on the GI scale.  Lifestyle  · You may want to follow a Mediterranean diet. This diet includes a lot of vegetables, lean meats or fish, whole grains, fruits, and healthy oils and fats.  What foods can I eat?    Fruits  Bananas. Apples. Oranges. Grapes. Papaya. Sj. Pomegranate. Kiwi. Grapefruit. Cherries.  Vegetables  Lettuce. Spinach. Peas. Beets. Cauliflower. Cabbage. Broccoli. Carrots. Tomatoes. Squash. Eggplant. Herbs. Peppers. Onions. Cucumbers. [place] sprouts. Yams and sweet potatoes. Beans.  Lentils.  Grains  Whole wheat or whole-grain foods, including breads, crackers, cereals, and pasta. Stone-ground whole wheat. Unsweetened oatmeal. Bulgur. Barley. Quinoa. Brown or wild rice. Corn or whole wheat flour tortillas.  Meats and other proteins  Lean meats. Poultry. Tofu. Seafood and shellfish.  Dairy  Low-fat or fat-free dairy products, such as yogurt, cottage cheese, or cheese.  Beverages  Water. Sugar-free drinks. Tea. Coffee. Low-fat or skim milk. Milk alternatives, such as soy or almond milk. Real fruit juice.  Fats and oils  Avocado. Canola or olive oil. Nuts and nut butters. Seeds.  Seasonings and condiments  Mustard. Relish. Low-fat, low-sugar ketchup and barbecue sauce. Low-fat or fat-free mayonnaise.  Sweets and desserts  Sugar-free sweets.  The items listed above may not be a complete list of foods and beverages you can eat. Contact a dietitian for more information.  What foods should I limit or avoid?  Meats and other proteins  Limit red meat to 1-2 times a week.  Dairy  Full-fat dairy.  Fats and oils  Palm oil and coconut oil. Fried foods.  Other foods  Processed foods. Foods that contain a lot of salt or sodium.  Sweets and desserts  Sweets that contain sugar.  Beverages  Sweetened drinks, such as sweet tea, milkshakes, iced sweet drinks, and sodas. Alcohol.  The items listed above may not be a complete list of foods and beverages you should avoid. Contact a dietitian for more information.  Where to find more information  The National [place] of Diabetes and Digestive and Kidney Diseases: niddk.nih.gov  Summary  · Nonalcoholic fatty liver disease is a condition that causes fat to build up in and around the liver.  · Following a healthy diet can help to keep nonalcoholic fatty liver disease under control. Your diet should be rich in fruits, vegetables, whole grains, and lean proteins.  · Limit your intake of saturated fat. Saturated fat is found in foods that come from animals, including meat  and dairy products such as butter, cheese, and whole milk.  · This diet promotes weight loss, helps to control blood sugar levels, and helps to improve the way that the body uses insulin.  This information is not intended to replace advice given to you by your health care provider. Make sure you discuss any questions you have with your health care provider.  Document Revised: 04/10/2020 Document Reviewed: 01/09/2020  Elsevier Patient Education © 2020 [employer]

## 2021-01-14 ENCOUNTER — LAB (OUTPATIENT)
Dept: LAB | Facility: HOSPITAL | Age: 31
End: 2021-01-14

## 2021-01-14 LAB
25(OH)D3 SERPL-MCNC: 19.7 NG/ML (ref 30–100)
ALBUMIN SERPL-MCNC: 4.2 G/DL (ref 3.5–5.2)
ALBUMIN/GLOB SERPL: 1.6 G/DL
ALP SERPL-CCNC: 67 U/L (ref 39–117)
ALT SERPL W P-5'-P-CCNC: 46 U/L (ref 1–33)
ANION GAP SERPL CALCULATED.3IONS-SCNC: 8.8 MMOL/L (ref 5–15)
AST SERPL-CCNC: 22 U/L (ref 1–32)
BILIRUB SERPL-MCNC: 0.4 MG/DL (ref 0–1.2)
BUN SERPL-MCNC: 8 MG/DL (ref 6–20)
BUN/CREAT SERPL: 11.9 (ref 7–25)
CALCIUM SPEC-SCNC: 8.9 MG/DL (ref 8.6–10.5)
CHLORIDE SERPL-SCNC: 107 MMOL/L (ref 98–107)
CHOLEST SERPL-MCNC: 187 MG/DL (ref 0–200)
CO2 SERPL-SCNC: 24.2 MMOL/L (ref 22–29)
CREAT SERPL-MCNC: 0.67 MG/DL (ref 0.57–1)
GFR SERPL CREATININE-BSD FRML MDRD: 102 ML/MIN/1.73
GLOBULIN UR ELPH-MCNC: 2.6 GM/DL
GLUCOSE SERPL-MCNC: 94 MG/DL (ref 65–99)
HBA1C MFR BLD: 5.32 % (ref 4.8–5.6)
HDLC SERPL-MCNC: 52 MG/DL (ref 40–60)
LDLC SERPL CALC-MCNC: 116 MG/DL (ref 0–100)
LDLC/HDLC SERPL: 2.19 {RATIO}
POTASSIUM SERPL-SCNC: 4.3 MMOL/L (ref 3.5–5.2)
PROT SERPL-MCNC: 6.8 G/DL (ref 6–8.5)
SODIUM SERPL-SCNC: 140 MMOL/L (ref 136–145)
TRIGL SERPL-MCNC: 105 MG/DL (ref 0–150)
TSH SERPL DL<=0.05 MIU/L-ACNC: 2.56 UIU/ML (ref 0.27–4.2)
VLDLC SERPL-MCNC: 19 MG/DL (ref 5–40)

## 2021-01-14 PROCEDURE — 82306 VITAMIN D 25 HYDROXY: CPT | Performed by: NURSE PRACTITIONER

## 2021-01-14 PROCEDURE — 83036 HEMOGLOBIN GLYCOSYLATED A1C: CPT | Performed by: NURSE PRACTITIONER

## 2021-01-14 PROCEDURE — 80053 COMPREHEN METABOLIC PANEL: CPT | Performed by: NURSE PRACTITIONER

## 2021-01-14 PROCEDURE — 80061 LIPID PANEL: CPT | Performed by: NURSE PRACTITIONER

## 2021-01-14 PROCEDURE — 84443 ASSAY THYROID STIM HORMONE: CPT | Performed by: NURSE PRACTITIONER

## 2021-01-29 NOTE — PROGRESS NOTES
Subjective   Sofiya Almaguer is a 30 y.o. female.     She presents today for her annual wellness physical.  She is due for fasting labs.  She reports that her acne medication does seem to be working well for her symptoms considering wearing a mask.  She reports that she is doing well on the NuvaRing, and would like to continue this.  She reports that her lower back pain has been fairly well controlled recently.  She does need a refill on her ibuprofen 800.  She is otherwise without any other new complaints today in the office.  Her weight is remained stable since her last office visit.  Her BMI is currently 29.6%.    Rash  This is a chronic problem. The current episode started more than 1 year ago. The problem has been waxing and waning since onset. The affected locations include the face. The rash is characterized by redness. Associated symptoms include joint pain. Pertinent negatives include no facial edema. The treatment provided no relief.   Back Pain  This is a chronic problem. The current episode started more than 1 year ago. The problem occurs intermittently. The problem has been waxing and waning since onset. The pain is present in the lumbar spine. The quality of the pain is described as aching. Pertinent negatives include no bladder incontinence, dysuria, leg pain, paresis, pelvic pain or perianal numbness. Risk factors include lack of exercise. The treatment provided no relief.        The following portions of the patient's history were reviewed and updated as appropriate: allergies, current medications, past family history, past medical history, past social history, past surgical history and problem list.    Review of Systems   Constitutional: Negative.    HENT: Negative.    Eyes: Negative.    Respiratory: Negative.    Cardiovascular: Negative.    Gastrointestinal: Negative.    Genitourinary: Negative for dysuria, pelvic pain and urinary incontinence.   Musculoskeletal: Positive for arthralgias,  back pain, joint pain and myalgias.   Skin: Positive for rash.   Allergic/Immunologic: Negative.    Neurological: Negative.    Hematological: Negative.    Psychiatric/Behavioral: Negative.        Objective   Physical Exam  Vitals signs reviewed.   Constitutional:       General: She is not in acute distress.     Appearance: She is well-developed and overweight. She is not diaphoretic.   HENT:      Head: Normocephalic.      Right Ear: External ear normal.      Left Ear: External ear normal.      Nose: Nose normal.   Eyes:      Pupils: Pupils are equal, round, and reactive to light.   Neck:      Musculoskeletal: Normal range of motion and neck supple.      Thyroid: No thyromegaly.      Vascular: No JVD.   Cardiovascular:      Rate and Rhythm: Normal rate and regular rhythm.      Heart sounds: No murmur. No friction rub. No gallop.    Pulmonary:      Effort: Pulmonary effort is normal. No respiratory distress.      Breath sounds: Normal breath sounds. No wheezing or rales.   Musculoskeletal: Normal range of motion.   Skin:     General: Skin is warm and dry.      Coloration: Skin is not pale.      Findings: No erythema or rash.   Neurological:      Mental Status: She is alert and oriented to person, place, and time.   Psychiatric:         Behavior: Behavior normal.         Thought Content: Thought content normal.         Judgment: Judgment normal.           Assessment/Plan   Diagnoses and all orders for this visit:    1. Well adult exam (Primary)  -     CBC & Differential  -     Comprehensive Metabolic Panel  -     Hemoglobin A1c  -     Lipid Panel  -     TSH  -     Vitamin D 25 Hydroxy    2. Screening for diabetes mellitus  -     CBC & Differential  -     Comprehensive Metabolic Panel  -     Hemoglobin A1c  -     Lipid Panel  -     TSH  -     Vitamin D 25 Hydroxy    3. Screening for thyroid disorder  -     CBC & Differential  -     Comprehensive Metabolic Panel  -     Hemoglobin A1c  -     Lipid Panel  -     TSH  -      Vitamin D 25 Hydroxy    4. Screening for lipoid disorders  -     CBC & Differential  -     Comprehensive Metabolic Panel  -     Hemoglobin A1c  -     Lipid Panel  -     TSH  -     Vitamin D 25 Hydroxy    5. Chronic bilateral low back pain without sciatica  -     ibuprofen (ADVIL,MOTRIN) 800 MG tablet; Take 1 tablet by mouth Every 8 (Eight) Hours As Needed for Mild Pain .  Dispense: 90 tablet; Refill: 11               Patient's Body mass index is 29.63 kg/m². BMI is above normal parameters. Recommendations include: educational material.    Fasting labs.  Continue current medications.  Follow up in 1 year for routine follow up and annual wellness visit.  Follow up sooner for problems/concerns.  Patient verbalized understanding and agreement with plan of care.        This document has been electronically signed by LEEANN Chatman on January 28, 2021 21:20 CST

## 2021-02-22 DIAGNOSIS — L70.0 ACNE VULGARIS: ICD-10-CM

## 2021-02-25 RX ORDER — CLINDAMYCIN AND BENZOYL PEROXIDE 10; 50 MG/G; MG/G
GEL TOPICAL
Qty: 50 G | Refills: 11 | Status: SHIPPED | OUTPATIENT
Start: 2021-02-25 | End: 2022-02-23 | Stop reason: SDUPTHER

## 2021-03-12 ENCOUNTER — TELEPHONE (OUTPATIENT)
Dept: FAMILY MEDICINE CLINIC | Facility: CLINIC | Age: 31
End: 2021-03-12

## 2021-03-12 DIAGNOSIS — Z30.44 ENCOUNTER FOR SURVEILLANCE OF VAGINAL RING HORMONAL CONTRACEPTIVE DEVICE: Primary | ICD-10-CM

## 2021-03-12 NOTE — TELEPHONE ENCOUNTER
I don't see this on her active medication list.  I thought she was using a different birth control for some reason?

## 2021-03-12 NOTE — TELEPHONE ENCOUNTER
I spoke with Sofiya she stated that she has been using the Nuvaring for 2 years now and you have been refilling it for her. She has 1 left.

## 2021-03-16 RX ORDER — ETONOGESTREL AND ETHINYL ESTRADIOL 11.7; 2.7 MG/1; MG/1
INSERT, EXTENDED RELEASE VAGINAL
Qty: 3 EACH | Refills: 4 | Status: SHIPPED | OUTPATIENT
Start: 2021-03-16 | End: 2022-02-23 | Stop reason: SDUPTHER

## 2021-05-06 ENCOUNTER — OFFICE VISIT (OUTPATIENT)
Dept: FAMILY MEDICINE CLINIC | Facility: CLINIC | Age: 31
End: 2021-05-06

## 2021-05-06 VITALS
TEMPERATURE: 98.2 F | RESPIRATION RATE: 22 BRPM | BODY MASS INDEX: 29.85 KG/M2 | SYSTOLIC BLOOD PRESSURE: 110 MMHG | DIASTOLIC BLOOD PRESSURE: 68 MMHG | WEIGHT: 162.2 LBS | HEIGHT: 62 IN | HEART RATE: 69 BPM | OXYGEN SATURATION: 99 %

## 2021-05-06 DIAGNOSIS — H66.92 LEFT OTITIS MEDIA, UNSPECIFIED OTITIS MEDIA TYPE: Primary | ICD-10-CM

## 2021-05-06 PROBLEM — J01.90 ACUTE SINUSITIS: Status: RESOLVED | Noted: 2019-05-31 | Resolved: 2021-05-06

## 2021-05-06 PROBLEM — J40 BRONCHITIS: Status: RESOLVED | Noted: 2019-05-31 | Resolved: 2021-05-06

## 2021-05-06 PROCEDURE — 99213 OFFICE O/P EST LOW 20 MIN: CPT | Performed by: NURSE PRACTITIONER

## 2021-05-06 RX ORDER — NYSTATIN 100000 [USP'U]/G
POWDER TOPICAL
COMMUNITY
Start: 2021-05-06 | End: 2023-01-25 | Stop reason: HOSPADM

## 2021-05-06 RX ORDER — VALACYCLOVIR HYDROCHLORIDE 1 G/1
TABLET, FILM COATED ORAL
COMMUNITY
Start: 2021-05-06 | End: 2022-02-23

## 2021-05-06 RX ORDER — SULFAMETHOXAZOLE AND TRIMETHOPRIM 800; 160 MG/1; MG/1
1 TABLET ORAL 2 TIMES DAILY
Qty: 20 TABLET | Refills: 0 | Status: SHIPPED | OUTPATIENT
Start: 2021-05-06 | End: 2022-02-23

## 2021-05-06 NOTE — PROGRESS NOTES
"Chief Complaint  Earache    Subjective          Sofiya Almaguer presents to [facility]    [facility]    PCP: LEEANN Bennett    CC: \"ear pain\"    Reports she had dental work on left upper teeth last week, symptoms started a few days later. Hx of chronic ear infections, eustachian tube dysfunction.  Sees ENT.     Earache   There is pain in the left ear. This is a new problem. Episode onset: 5/1. Episode frequency: daily. The problem has been gradually worsening. There has been no fever. The pain is mild. Associated symptoms include hearing loss (muflled) and rhinorrhea (sometimes at night). Pertinent negatives include no abdominal pain, coughing, diarrhea, ear discharge, headaches, neck pain, rash, sore throat or vomiting. Associated symptoms comments: Feels like needles in ear  . Treatments tried: started nasal spray and zyrtec last night. The treatment provided no relief. Her past medical history is significant for a chronic ear infection.       Review of Systems   Constitutional: Negative.    HENT: Positive for ear pain, hearing loss (muflled), postnasal drip and rhinorrhea (sometimes at night). Negative for congestion, ear discharge, sinus pressure, sneezing, sore throat and trouble swallowing.    Eyes: Negative.    Respiratory: Negative for cough, chest tightness, shortness of breath and wheezing.    Cardiovascular: Negative.    Gastrointestinal: Negative for abdominal pain, diarrhea, nausea and vomiting.   Genitourinary: Negative.    Musculoskeletal: Negative.  Negative for neck pain.   Skin: Negative for rash.   Neurological: Negative for dizziness and headaches.        Objective   Vital Signs:   /68 (BP Location: Left arm, Patient Position: Sitting, Cuff Size: Adult)   Pulse 69   Temp 98.2 °F (36.8 °C)   Resp 22   Ht 156.2 cm (61.5\")   Wt 73.6 kg (162 lb 3.2 oz)   SpO2 99%   BMI 30.15 kg/m²       Physical Exam  Vitals and nursing note reviewed. "   Constitutional:       General: She is not in acute distress.     Appearance: Normal appearance. She is not ill-appearing.   HENT:      Head: Normocephalic and atraumatic.      Right Ear: Tympanic membrane is injected and scarred.      Left Ear: Tympanic membrane is scarred. Tympanic membrane is not injected.      Nose: Nose normal.      Right Sinus: No maxillary sinus tenderness or frontal sinus tenderness.      Left Sinus: No maxillary sinus tenderness or frontal sinus tenderness.      Mouth/Throat:      Mouth: Mucous membranes are moist.      Pharynx: Oropharynx is clear. No pharyngeal swelling, oropharyngeal exudate or posterior oropharyngeal erythema.      Comments: +clear PND  Eyes:      General:         Right eye: No discharge.         Left eye: No discharge.      Conjunctiva/sclera: Conjunctivae normal.   Cardiovascular:      Rate and Rhythm: Normal rate and regular rhythm.   Pulmonary:      Effort: Pulmonary effort is normal. No respiratory distress.      Breath sounds: Normal breath sounds. No wheezing, rhonchi or rales.   Musculoskeletal:      Cervical back: Neck supple. Tenderness (along left eustachian tube) present.   Lymphadenopathy:      Cervical: No cervical adenopathy.   Skin:     General: Skin is warm and dry.   Neurological:      General: No focal deficit present.      Mental Status: She is alert and oriented to person, place, and time.          Result Review :                 Assessment and Plan    Diagnoses and all orders for this visit:    1. Left otitis media, unspecified otitis media type (Primary)  -     sulfamethoxazole-trimethoprim (Bactrim DS) 800-160 MG per tablet; Take 1 tablet by mouth 2 (Two) Times a Day.  Dispense: 20 tablet; Refill: 0    Push fluids  Rest  Tylenol or Motrin as needed  Continue with Zyrtec, NS  Rx for Bactrim as this has worked well in the past    See PCP or RTC if symptoms persist/worsen  See PCP for routine f/u visit and management of chronic medical  conditions      This document has been electronically signed by LEEANN Pineda on May 6, 2021 17:15 CDT,.

## 2021-05-06 NOTE — PATIENT INSTRUCTIONS
Otitis Media, Adult    Otitis media occurs when there is inflammation and fluid in the middle ear space with signs and symptoms of an acute infection. The middle ear is a part of the ear that contains bones for hearing as well as air that helps send sounds to the brain. When infected fluid builds up in this space, it causes pressure and results in symptoms of acute otitis media. The eustachian tube connects the middle ear to the back of the nose (nasopharynx) and normally allows air into the middle ear space. If the eustachian tube becomes blocked, fluid can build up and become infected.  What are the causes?  This condition is caused by a blockage in the eustachian tube. This can be caused by an object like mucus, or by swelling (edema) of the tube. Problems that can cause a blockage include:  · A cold or other upper respiratory infection.  · Allergies.  · An irritant, such as tobacco smoke.  · Enlarged adenoids. The adenoids are areas of soft tissue located high in the back of the throat, behind the nose and the roof of the mouth. They are part of the body's defense system (immune system).  · A mass in the nasopharynx.  · Damage to the ear caused by pressure changes (barotrauma).  What are the signs or symptoms?  Symptoms of this condition include:  · Ear pain.  · Fever.  · Decreased hearing.  · Tiredness (lethargy).  · Fluid leaking from the ear, if the eardrum is ruptured or has burst.  · Ringing in the ear.  How is this diagnosed?    This condition is diagnosed with a physical exam. During the exam, your health care provider will use an instrument called an otoscope to look in your ear and check for redness, swelling, and fluid. He or she will also ask about your symptoms.  Your health care provider may also order tests, such as:  · A pneumatic otoscopy. This is a test to check the movement of the eardrum. It is done by squeezing a small amount of air into the ear.  · A tympanogram is a test that shows how well  the eardrum moves in response to air pressure in the ear canal. It provides a graph for your health care provider to review.  How is this treated?  This condition can go away on its own within 3-5 days. But if the condition is caused by a bacterial infection and does not go away on its own, or if it keeps coming back, your health care provider may:  · Prescribe antibiotic medicine to treat the infection.  · Prescribe or recommend medicines to control pain.  Follow these instructions at home:  · Take over-the-counter and prescription medicines only as told by your health care provider.  · If you were prescribed an antibiotic medicine, take it as told by your health care provider. Do not stop taking the antibiotic even if you start to feel better.  · Keep all follow-up visits as told by your health care provider. This is important.  Contact a health care provider if:  · You have bleeding from your nose.  · There is a lump on your neck.  · You are not feeling better in 5 days.  · You feel worse instead of better.  Get help right away if:  · You have severe pain that is not controlled with medicine.  · You have swelling, redness, or pain around your ear.  · You have stiffness in your neck.  · A part of your face is not moving (paralyzed).  · The bone behind your ear (mastoid) is tender when you touch it.  · You develop a severe headache.  Summary  · Otitis media is redness, soreness, and swelling of the middle ear, usually resulting in pain.  · This condition can go away on its own within 3-5 days.  · If the problem does not go away in 3-5 days, your health care provider may prescribe or recommend medicines to treat the infection or your symptoms.  · If you were prescribed an antibiotic medicine, take it as told by your health care provider.  · Follow all instructions you were given by your health care provider.  This information is not intended to replace advice given to you by your health care provider. Make sure you  discuss any questions you have with your health care provider.  Document Revised: 11/19/2020 Document Reviewed: 11/19/2020  Elsevier Patient Education © 2021 [employer]

## 2021-08-23 ENCOUNTER — PATIENT MESSAGE (OUTPATIENT)
Dept: FAMILY MEDICINE CLINIC | Facility: CLINIC | Age: 31
End: 2021-08-23

## 2021-08-23 DIAGNOSIS — U07.1 COVID-19: Primary | ICD-10-CM

## 2021-08-24 RX ORDER — PREDNISONE 10 MG/1
TABLET ORAL
Qty: 17 TABLET | Refills: 0 | Status: SHIPPED | OUTPATIENT
Start: 2021-08-24 | End: 2021-09-02

## 2021-08-24 RX ORDER — ALBUTEROL SULFATE 90 UG/1
2 AEROSOL, METERED RESPIRATORY (INHALATION) EVERY 4 HOURS PRN
Qty: 18 G | Refills: 1 | Status: SHIPPED | OUTPATIENT
Start: 2021-08-24 | End: 2022-02-23

## 2021-08-24 RX ORDER — AZITHROMYCIN 500 MG/1
500 TABLET, FILM COATED ORAL DAILY
Qty: 5 TABLET | Refills: 0 | Status: SHIPPED | OUTPATIENT
Start: 2021-08-24 | End: 2021-08-29

## 2021-08-24 NOTE — TELEPHONE ENCOUNTER
Miranda Starks MA 8/23/2021 5:09 PM CDT      ----- Message -----  From: Sofiya Almaguer  Sent: 8/23/2021 9:06 AM CDT  To: [facility]  Subject: Non-Urgent Medical Question     Nadeem. I wanted to let you know I tested positive for covid Thursday. I am fully vaccinated but Forrest was not. He got it on the 11th of Aug and friends that are living with us then got it. I was bound to get it eventually since my house was infected. I wanted to find out if they are doing treatments for it. would it be worth asking u for a zpak? and maybe an inhaler? is there any follow up testing to see if i have long term damage from this? Besides a headache i didnt have any symptoms until this morning. today i have some congestion and shortness of breath. no taste or smell. still feel normal as far as energy and no fever at all. Any advice? I have also considered the clinical trial of the antiviral. please let me know any info u have on that

## 2021-11-02 DIAGNOSIS — M54.50 CHRONIC BILATERAL LOW BACK PAIN WITHOUT SCIATICA: ICD-10-CM

## 2021-11-02 DIAGNOSIS — G89.29 CHRONIC BILATERAL LOW BACK PAIN WITHOUT SCIATICA: ICD-10-CM

## 2022-02-23 ENCOUNTER — OFFICE VISIT (OUTPATIENT)
Dept: FAMILY MEDICINE CLINIC | Facility: CLINIC | Age: 32
End: 2022-02-23

## 2022-02-23 VITALS
BODY MASS INDEX: 30.15 KG/M2 | HEIGHT: 61 IN | WEIGHT: 159.7 LBS | TEMPERATURE: 97.7 F | SYSTOLIC BLOOD PRESSURE: 110 MMHG | OXYGEN SATURATION: 98 % | HEART RATE: 75 BPM | RESPIRATION RATE: 20 BRPM | DIASTOLIC BLOOD PRESSURE: 60 MMHG

## 2022-02-23 DIAGNOSIS — Z13.1 SCREENING FOR DIABETES MELLITUS: ICD-10-CM

## 2022-02-23 DIAGNOSIS — Z13.220 SCREENING FOR LIPOID DISORDERS: ICD-10-CM

## 2022-02-23 DIAGNOSIS — E66.9 OBESITY (BMI 30.0-34.9): ICD-10-CM

## 2022-02-23 DIAGNOSIS — M54.50 CHRONIC BILATERAL LOW BACK PAIN WITHOUT SCIATICA: ICD-10-CM

## 2022-02-23 DIAGNOSIS — Z00.00 WELL ADULT EXAM: Primary | ICD-10-CM

## 2022-02-23 DIAGNOSIS — Z30.44 ENCOUNTER FOR SURVEILLANCE OF VAGINAL RING HORMONAL CONTRACEPTIVE DEVICE: ICD-10-CM

## 2022-02-23 DIAGNOSIS — L70.0 ACNE VULGARIS: ICD-10-CM

## 2022-02-23 DIAGNOSIS — Z13.29 SCREENING FOR THYROID DISORDER: ICD-10-CM

## 2022-02-23 DIAGNOSIS — G89.29 CHRONIC BILATERAL LOW BACK PAIN WITHOUT SCIATICA: ICD-10-CM

## 2022-02-23 PROCEDURE — 99395 PREV VISIT EST AGE 18-39: CPT | Performed by: NURSE PRACTITIONER

## 2022-02-23 RX ORDER — DICLOFENAC EPOLAMINE 0.01 G/1
1 SYSTEM TOPICAL 2 TIMES DAILY
Qty: 30 PATCH | Refills: 12 | Status: SHIPPED | OUTPATIENT
Start: 2022-02-23 | End: 2022-11-09

## 2022-02-23 RX ORDER — ETONOGESTREL AND ETHINYL ESTRADIOL 11.7; 2.7 MG/1; MG/1
INSERT, EXTENDED RELEASE VAGINAL
Qty: 3 EACH | Refills: 4 | Status: SHIPPED | OUTPATIENT
Start: 2022-02-23 | End: 2022-11-09

## 2022-02-23 RX ORDER — CLINDAMYCIN AND BENZOYL PEROXIDE 10; 50 MG/G; MG/G
GEL TOPICAL 2 TIMES DAILY
Qty: 50 G | Refills: 12 | Status: SHIPPED | OUTPATIENT
Start: 2022-02-23 | End: 2023-01-25 | Stop reason: HOSPADM

## 2022-02-23 RX ORDER — IBUPROFEN 800 MG/1
800 TABLET ORAL EVERY 8 HOURS PRN
Qty: 90 TABLET | Refills: 11 | Status: SHIPPED | OUTPATIENT
Start: 2022-02-23 | End: 2022-11-09

## 2022-02-28 ENCOUNTER — LAB (OUTPATIENT)
Dept: LAB | Facility: HOSPITAL | Age: 32
End: 2022-02-28

## 2022-02-28 LAB
25(OH)D3 SERPL-MCNC: 11.6 NG/ML
ALBUMIN SERPL-MCNC: 4.1 G/DL (ref 3.5–5.2)
ALBUMIN/GLOB SERPL: 1.3 G/DL
ALP SERPL-CCNC: 77 U/L (ref 39–117)
ALT SERPL W P-5'-P-CCNC: 28 U/L (ref 1–33)
ANION GAP SERPL CALCULATED.3IONS-SCNC: 11.7 MMOL/L (ref 5–15)
AST SERPL-CCNC: 18 U/L (ref 1–32)
BASOPHILS # BLD AUTO: 0.03 10*3/MM3 (ref 0–0.2)
BASOPHILS NFR BLD AUTO: 0.6 % (ref 0–1.5)
BILIRUB SERPL-MCNC: 0.4 MG/DL (ref 0–1.2)
BUN SERPL-MCNC: 11 MG/DL (ref 6–20)
BUN/CREAT SERPL: 13.6 (ref 7–25)
CALCIUM SPEC-SCNC: 9.4 MG/DL (ref 8.6–10.5)
CHLORIDE SERPL-SCNC: 106 MMOL/L (ref 98–107)
CHOLEST SERPL-MCNC: 191 MG/DL (ref 0–200)
CO2 SERPL-SCNC: 23.3 MMOL/L (ref 22–29)
CREAT SERPL-MCNC: 0.81 MG/DL (ref 0.57–1)
DEPRECATED RDW RBC AUTO: 36.4 FL (ref 37–54)
EGFRCR SERPLBLD CKD-EPI 2021: 98.4 ML/MIN/1.73
EOSINOPHIL # BLD AUTO: 0.1 10*3/MM3 (ref 0–0.4)
EOSINOPHIL NFR BLD AUTO: 1.9 % (ref 0.3–6.2)
ERYTHROCYTE [DISTWIDTH] IN BLOOD BY AUTOMATED COUNT: 12.2 % (ref 12.3–15.4)
GLOBULIN UR ELPH-MCNC: 3.1 GM/DL
GLUCOSE SERPL-MCNC: 88 MG/DL (ref 65–99)
HBA1C MFR BLD: 4.9 % (ref 4.8–5.6)
HCT VFR BLD AUTO: 42.7 % (ref 34–46.6)
HDLC SERPL-MCNC: 51 MG/DL (ref 40–60)
HGB BLD-MCNC: 14.8 G/DL (ref 12–15.9)
IMM GRANULOCYTES # BLD AUTO: 0.01 10*3/MM3 (ref 0–0.05)
IMM GRANULOCYTES NFR BLD AUTO: 0.2 % (ref 0–0.5)
LDLC SERPL CALC-MCNC: 127 MG/DL (ref 0–100)
LDLC/HDLC SERPL: 2.47 {RATIO}
LYMPHOCYTES # BLD AUTO: 1.56 10*3/MM3 (ref 0.7–3.1)
LYMPHOCYTES NFR BLD AUTO: 29.2 % (ref 19.6–45.3)
MCH RBC QN AUTO: 28.8 PG (ref 26.6–33)
MCHC RBC AUTO-ENTMCNC: 34.7 G/DL (ref 31.5–35.7)
MCV RBC AUTO: 83.2 FL (ref 79–97)
MONOCYTES # BLD AUTO: 0.34 10*3/MM3 (ref 0.1–0.9)
MONOCYTES NFR BLD AUTO: 6.4 % (ref 5–12)
NEUTROPHILS NFR BLD AUTO: 3.3 10*3/MM3 (ref 1.7–7)
NEUTROPHILS NFR BLD AUTO: 61.7 % (ref 42.7–76)
NRBC BLD AUTO-RTO: 0 /100 WBC (ref 0–0.2)
PLATELET # BLD AUTO: 260 10*3/MM3 (ref 140–450)
PMV BLD AUTO: 10.8 FL (ref 6–12)
POTASSIUM SERPL-SCNC: 4.5 MMOL/L (ref 3.5–5.2)
PROT SERPL-MCNC: 7.2 G/DL (ref 6–8.5)
RBC # BLD AUTO: 5.13 10*6/MM3 (ref 3.77–5.28)
SODIUM SERPL-SCNC: 141 MMOL/L (ref 136–145)
TRIGL SERPL-MCNC: 69 MG/DL (ref 0–150)
TSH SERPL DL<=0.05 MIU/L-ACNC: 2.69 UIU/ML (ref 0.27–4.2)
VLDLC SERPL-MCNC: 13 MG/DL (ref 5–40)
WBC NRBC COR # BLD: 5.34 10*3/MM3 (ref 3.4–10.8)

## 2022-02-28 PROCEDURE — 82306 VITAMIN D 25 HYDROXY: CPT | Performed by: NURSE PRACTITIONER

## 2022-02-28 PROCEDURE — 80061 LIPID PANEL: CPT | Performed by: NURSE PRACTITIONER

## 2022-02-28 PROCEDURE — 83036 HEMOGLOBIN GLYCOSYLATED A1C: CPT | Performed by: NURSE PRACTITIONER

## 2022-02-28 PROCEDURE — 80050 GENERAL HEALTH PANEL: CPT | Performed by: NURSE PRACTITIONER

## 2022-03-07 ENCOUNTER — TELEPHONE (OUTPATIENT)
Dept: FAMILY MEDICINE CLINIC | Facility: CLINIC | Age: 32
End: 2022-03-07

## 2022-03-07 NOTE — TELEPHONE ENCOUNTER
----- Message from LEEANN Chatman sent at 3/1/2022  3:14 PM CST -----  Vitamin D is low.  Recommend over-the-counter vitamin D3 5000 IU once daily.  Normal thyroid.  Normal electrolytes, kidney function, liver function.  LDL cholesterol is elevated.  Recommend monitoring diet closely and avoiding excess greasy/fatty/fried foods and high fructose corn syrup.  Normal A1c.  Blood counts are okay.

## 2022-03-12 NOTE — PROGRESS NOTES
Subjective   Sofiya Almaguer is a 32 y.o. female.     She presents today for her annual wellness physical.  She is due for fasting labs.  She reports that her acne medication does seem to be working well for her symptoms.  She reports that she is doing well on the NuvaRing, and would like to continue this.  She reports that her lower back pain has been fairly well controlled recently.  She does need a refill on her ibuprofen 800.  She is otherwise without any other new complaints today in the office.  Her weight is remained stable since her last office visit.  She has lost 3 pounds.  Her BMI is currently 30.2%.    Rash  This is a chronic problem. The current episode started more than 1 year ago. The problem has been waxing and waning since onset. The affected locations include the face. The rash is characterized by redness. Associated symptoms include joint pain. Pertinent negatives include no facial edema. The treatment provided no relief.   Back Pain  This is a chronic problem. The current episode started more than 1 year ago. The problem occurs intermittently. The problem has been waxing and waning since onset. The pain is present in the lumbar spine. The quality of the pain is described as aching. Pertinent negatives include no bladder incontinence, dysuria, leg pain, paresis, pelvic pain or perianal numbness. Risk factors include lack of exercise. The treatment provided no relief.        The following portions of the patient's history were reviewed and updated as appropriate: allergies, current medications, past family history, past medical history, past social history, past surgical history and problem list.    Review of Systems   Constitutional: Negative.    HENT: Negative.    Eyes: Negative.    Respiratory: Negative.    Cardiovascular: Negative.    Gastrointestinal: Negative.    Genitourinary: Negative for dysuria, pelvic pain and urinary incontinence.   Musculoskeletal: Positive for arthralgias, back  pain, joint pain and myalgias.   Skin: Positive for rash.   Allergic/Immunologic: Negative.    Neurological: Negative.    Hematological: Negative.    Psychiatric/Behavioral: Negative.        Objective   Physical Exam  Vitals reviewed.   Constitutional:       General: She is not in acute distress.     Appearance: She is well-developed and overweight. She is not diaphoretic.   HENT:      Head: Normocephalic.      Right Ear: External ear normal.      Left Ear: External ear normal.      Nose: Nose normal.   Eyes:      Pupils: Pupils are equal, round, and reactive to light.   Neck:      Thyroid: No thyromegaly.      Vascular: No JVD.   Cardiovascular:      Rate and Rhythm: Normal rate and regular rhythm.      Heart sounds: No murmur heard.    No friction rub. No gallop.   Pulmonary:      Effort: Pulmonary effort is normal. No respiratory distress.      Breath sounds: Normal breath sounds. No wheezing or rales.   Musculoskeletal:         General: Normal range of motion.      Cervical back: Normal range of motion and neck supple.   Skin:     General: Skin is warm and dry.      Coloration: Skin is not pale.      Findings: No erythema or rash.   Neurological:      Mental Status: She is alert and oriented to person, place, and time.   Psychiatric:         Behavior: Behavior normal.         Thought Content: Thought content normal.         Judgment: Judgment normal.           Assessment/Plan   Diagnoses and all orders for this visit:    1. Well adult exam (Primary)  -     CBC & Differential  -     Comprehensive Metabolic Panel  -     Hemoglobin A1c  -     Lipid Panel  -     TSH  -     Vitamin D 25 Hydroxy    2. Screening for diabetes mellitus  -     CBC & Differential  -     Comprehensive Metabolic Panel  -     Hemoglobin A1c  -     Lipid Panel  -     TSH  -     Vitamin D 25 Hydroxy    3. Screening for lipoid disorders  -     CBC & Differential  -     Comprehensive Metabolic Panel  -     Hemoglobin A1c  -     Lipid Panel  -      TSH  -     Vitamin D 25 Hydroxy    4. Screening for thyroid disorder  -     CBC & Differential  -     Comprehensive Metabolic Panel  -     Hemoglobin A1c  -     Lipid Panel  -     TSH  -     Vitamin D 25 Hydroxy    5. Chronic bilateral low back pain without sciatica  -     ibuprofen (ADVIL,MOTRIN) 800 MG tablet; Take 1 tablet by mouth Every 8 (Eight) Hours As Needed for Mild Pain .  Dispense: 90 tablet; Refill: 11  -     Diclofenac Epolamine (Flector) 1.3 % patch patch; Apply 1 patch topically to the appropriate area as directed 2 (Two) Times a Day.  Dispense: 30 patch; Refill: 12    6. Encounter for surveillance of vaginal ring hormonal contraceptive device  -     etonogestrel-ethinyl estradiol (NuvaRing) 0.12-0.015 MG/24HR vaginal ring; Insert vaginally and leave in place for 3 consecutive weeks, then remove for 1 week.  Dispense: 3 each; Refill: 4    7. Acne vulgaris  -     clindamycin-benzoyl peroxide (BENZACLIN) 1-5 % gel; Apply  topically to the appropriate area as directed 2 (Two) Times a Day.  Dispense: 50 g; Refill: 12    8. Obesity (BMI 30.0-34.9)               Patient's Body mass index is 30.18 kg/m². indicating that she is obese (BMI >30). Obesity-related health conditions include the following: osteoarthritis. Obesity is improving with lifestyle modifications. BMI is is above average; BMI management plan is completed. We discussed portion control and increasing exercise..    Anticipatory guidance provided at discharge.  Fasting labs.  Continue current medications.  Follow up in 1 year for annual physical.  Follow up sooner for problems/concerns.  Patient verbalized understanding and agreement with plan of care.        This document has been electronically signed by LEEANN Chatman on March 12, 2022 12:35 CST

## 2022-03-25 ENCOUNTER — TELEPHONE (OUTPATIENT)
Dept: FAMILY MEDICINE CLINIC | Facility: CLINIC | Age: 32
End: 2022-03-25

## 2022-05-16 DIAGNOSIS — R11.0 NAUSEA: ICD-10-CM

## 2022-05-16 RX ORDER — ONDANSETRON 8 MG/1
TABLET, ORALLY DISINTEGRATING ORAL
Qty: 30 TABLET | Refills: 0 | OUTPATIENT
Start: 2022-05-16

## 2022-05-16 NOTE — TELEPHONE ENCOUNTER
Rx Refill Note  Requested Prescriptions     Refused Prescriptions Disp Refills   • ondansetron ODT (ZOFRAN-ODT) 8 MG disintegrating tablet [Pharmacy Med Name: Ondansetron 8 MG Oral Tablet Disintegrating] 30 tablet 0     Sig: DISSOLVE 1 TABLET IN MOUTH EVERY 8 HOURS AS NEEDED FOR NAUSEA AND VOMITING      Last office visit with prescribing clinician: 2/23/2022      Next office visit with prescribing clinician: 2/24/2023   {TIP  Encounters:      discontinued on 12/28/2020 by Mandy Fritz APRN Terrisa Renier Holt, LPN  05/16/22, 13:12 CDT

## 2022-06-27 ENCOUNTER — PATIENT MESSAGE (OUTPATIENT)
Dept: FAMILY MEDICINE CLINIC | Facility: CLINIC | Age: 32
End: 2022-06-27

## 2022-06-27 DIAGNOSIS — B00.1 COLD SORE: Primary | ICD-10-CM

## 2022-06-28 RX ORDER — ACYCLOVIR 50 MG/G
1 OINTMENT TOPICAL EVERY 4 HOURS
Qty: 30 G | Refills: 11 | Status: SHIPPED | OUTPATIENT
Start: 2022-06-28 | End: 2023-01-25 | Stop reason: HOSPADM

## 2022-06-28 NOTE — TELEPHONE ENCOUNTER
From: Sofiya Almaguer  To: LEEANN Chatman  Sent: 2022 6:45 PM CDT  Subject: prescription     I was wondering if you can send a prescription for acyclovir cream for my fever blister. i had it prescribed before but it . please and thankyou for all you do

## 2022-08-31 ENCOUNTER — TELEMEDICINE (OUTPATIENT)
Dept: FAMILY MEDICINE CLINIC | Facility: TELEHEALTH | Age: 32
End: 2022-08-31

## 2022-08-31 DIAGNOSIS — J22 LOWER RESPIRATORY INFECTION (E.G., BRONCHITIS, PNEUMONIA, PNEUMONITIS, PULMONITIS): Primary | ICD-10-CM

## 2022-08-31 PROCEDURE — 99213 OFFICE O/P EST LOW 20 MIN: CPT | Performed by: NURSE PRACTITIONER

## 2022-08-31 RX ORDER — METHYLPREDNISOLONE 4 MG/1
TABLET ORAL
Qty: 21 TABLET | Refills: 0 | Status: SHIPPED | OUTPATIENT
Start: 2022-08-31 | End: 2022-09-19

## 2022-08-31 RX ORDER — DEXTROMETHORPHAN HYDROBROMIDE AND PROMETHAZINE HYDROCHLORIDE 15; 6.25 MG/5ML; MG/5ML
5 SYRUP ORAL 4 TIMES DAILY PRN
Qty: 150 ML | Refills: 0 | Status: SHIPPED | OUTPATIENT
Start: 2022-08-31 | End: 2022-11-09

## 2022-08-31 RX ORDER — DOXYCYCLINE 100 MG/1
100 CAPSULE ORAL 2 TIMES DAILY
Qty: 20 CAPSULE | Refills: 0 | Status: SHIPPED | OUTPATIENT
Start: 2022-08-31 | End: 2022-09-10

## 2022-08-31 NOTE — PROGRESS NOTES
You have chosen to receive care through a telehealth visit.  Do you consent to use a video/audio connection for your medical care today? Yes     CHIEF COMPLAINT  No chief complaint on file.        [facility]  Sofiya Almaguer is a 32 y.o. female  presents with complaint of 1 week history of severe persistent dry cough with shortness of breath with exertion, hurts ribs to take deep breaths, chest tightness, wheezing, head/nasal congestion.  Denies fever.  Has albuterol inhaler and has been using which does help with wheezing and shortness of breath.     Tested negative for COVID x 5    Review of Systems   Constitutional: Negative for chills and fever.   HENT: Positive for congestion. Negative for ear pain, sinus pressure, sinus pain and sore throat.    Respiratory: Positive for cough, chest tightness, shortness of breath and wheezing.    Musculoskeletal: Negative for myalgias.   Neurological: Negative for headaches.   All other systems reviewed and are negative.      Past Medical History:   Diagnosis Date   • Abnormal liver function    • Abnormal Pap smear of cervix    • Abnormal weight gain    • Acne    • Cervical intraepithelial neoplasia grade 2     h/o   • Contraception    • Ear infection     bilateral   • Headache    • Hyperlipidemia    • Vitamin D deficiency        Family History   Problem Relation Age of Onset   • Bipolar disorder Mother    • Depression Mother         depressive disorder   • Diabetes Mother    • Hyperlipidemia Mother    • Hypertension Mother    • Asthma Mother    • Breast cancer Other    • Colon cancer Other    • Diabetes Maternal Grandmother    • Hypertension Maternal Grandmother    • Fibromyalgia Maternal Grandmother    • Colon cancer Maternal Grandfather    • Diabetes Maternal Grandfather    • Alcohol abuse Father    • Cancer Sister    • Autism Brother    • Liver disease Paternal Grandmother    • No Known Problems Son    • No Known Problems Son    • No Known Problems Son    • No Known  Problems Daughter    • No Known Problems Daughter        Social History     Socioeconomic History   • Marital status: Single   Tobacco Use   • Smoking status: Never Smoker   • Smokeless tobacco: Never Used   Vaping Use   • Vaping Use: Never used   Substance and Sexual Activity   • Alcohol use: No   • Drug use: Never   • Sexual activity: Defer     Partners: Male       Sofiya Almaguer  reports that she has never smoked. She has never used smokeless tobacco.              There were no vitals taken for this visit.    PHYSICAL EXAM  Physical Exam   Constitutional: She is oriented to person, place, and time. She appears well-developed and well-nourished. She does not have a sickly appearance. She does not appear ill.   HENT:   Head: Normocephalic and atraumatic.   Pulmonary/Chest: Effort normal.  No respiratory distress (persistent cough during visit, no audible wheezing).  Neurological: She is alert and oriented to person, place, and time.         Diagnoses and all orders for this visit:    1. Lower respiratory infection (e.g., bronchitis, pneumonia, pneumonitis, pulmonitis) (Primary)  -     doxycycline (MONODOX) 100 MG capsule; Take 1 capsule by mouth 2 (Two) Times a Day for 10 days.  Dispense: 20 capsule; Refill: 0  -     methylPREDNISolone (MEDROL) 4 MG dose pack; Take as directed on package instructions.  Dispense: 21 tablet; Refill: 0  -     promethazine-dextromethorphan (PROMETHAZINE-DM) 6.25-15 MG/5ML syrup; Take 5 mL by mouth 4 (Four) Times a Day As Needed for Cough.  Dispense: 150 mL; Refill: 0    --take medications as prescribed  --increase fluids, rest as needed, tylenol or ibuprofen for pain, continue albuterol inhaler every 4 hours as needed for wheezing/shortness of breath  --f/u in 5-7 days if no improvement      FOLLOW-UP  As discussed during visit with PCP/[facility] if no improvement or Urgent Care/Emergency Department if worsening of symptoms    Patient verbalizes understanding of medication  dosage, comfort measures, instructions for treatment and follow-up.    Mandy Fritz, LEEANN  08/31/2022  10:11 EDT    The use of a video visit has been reviewed with the patient and verbal informed consent has been obtained. Myself and Sofiya Almaguer participated in this visit. The patient is located in [address] [place] [address].    I am located in [place], [place]. Mychart and Zoom were utilized. I spent 20 minutes in the patient's chart for this visit.

## 2022-08-31 NOTE — PATIENT INSTRUCTIONS
Acute Bronchitis, Adult    Acute bronchitis is sudden or acute swelling of the air tubes (bronchi) in the lungs. Acute bronchitis causes these tubes to fill with mucus, which can make it hard to breathe. It can also cause coughing or wheezing.  In adults, acute bronchitis usually goes away within 2 weeks. A cough caused by bronchitis may last up to 3 weeks. Smoking, allergies, and asthma can make the condition worse.  What are the causes?  This condition can be caused by germs and by substances that irritate the lungs, including:  Cold and flu viruses. The most common cause of this condition is the virus that causes the common cold.  Bacteria.  Substances that irritate the lungs, including:  Smoke from cigarettes and other forms of tobacco.  Dust and pollen.  Fumes from chemical products, gases, or burned fuel.  Other materials that pollute indoor or outdoor air.  Close contact with someone who has acute bronchitis.  What increases the risk?  The following factors may make you more likely to develop this condition:  A weak body's defense system, also called the immune system.  A condition that affects your lungs and breathing, such as asthma.  What are the signs or symptoms?  Common symptoms of this condition include:  Lung and breathing problems, such as:  Coughing. This may bring up clear, yellow, or green mucus from your lungs (sputum).  Wheezing.  Having too much mucus in your lungs (chest congestion).  Having shortness of breath.  A fever.  Chills.  Aches and pains, including:  Tightness in your chest and other body aches.  A sore throat.  How is this diagnosed?  This condition is usually diagnosed based on:  Your symptoms and medical history.  A physical exam.  You may also have other tests, including tests to rule out other conditions, such pneumonia. These tests include:  A test of lung function.  Test of a mucus sample to look for the presence of bacteria.  Tests to check the oxygen level in your  blood.  Blood tests.  Chest X-ray.  How is this treated?  Most cases of acute bronchitis clear up over time without treatment. Your health care provider may recommend:  Drinking more fluids. This can thin your mucus, which may improve your breathing.  Taking a medicine for a fever or cough.  Using a device that gets medicine into your lungs (inhaler) to help improve breathing and control coughing.  Using a vaporizer or a humidifier. These are machines that add water to the air to help you breathe better.  Follow these instructions at home:  Activity  Get plenty of rest.  Return to your normal activities as told by your health care provider. Ask your health care provider what activities are safe for you.  Lifestyle  Drink enough fluid to keep your urine pale yellow.  Do not drink alcohol.  Do not use any products that contain nicotine or tobacco, such as cigarettes, e-cigarettes, and chewing tobacco. If you need help quitting, ask your health care provider. Be aware that:  Your bronchitis will get worse if you smoke or breathe in other people's smoke (secondhand smoke).  Your lungs will heal faster if you quit smoking.  General instructions    Take over-the-counter and prescription medicines only as told by your health care provider.  Use an inhaler, vaporizer, or humidifier as told by your health care provider.  If you have a sore throat, gargle with a salt-water mixture 3-4 times a day or as needed. To make a salt-water mixture, completely dissolve ½-1 tsp (3-6 g) of salt in 1 cup (237 mL) of warm water.  Keep all follow-up visits as told by your health care provider. This is important.    How is this prevented?  To lower your risk of getting this condition again:  Wash your hands often with soap and water. If soap and water are not available, use hand .  Avoid contact with people who have cold symptoms.  Try not to touch your mouth, nose, or eyes with your hands.  Avoid places where there are fumes from  chemicals. Breathing these fumes will make your condition worse.  Get the flu shot every year.  Contact a health care provider if:  Your symptoms do not improve after 2 weeks of treatment.  You vomit more than once or twice.  You have symptoms of dehydration such as:  Dark urine.  Dry skin or eyes.  Increased thirst.  Headaches.  Confusion.  Muscle cramps.  Get help right away if you:  Cough up blood.  Feel pain in your chest.  Have severe shortness of breath.  Faint or keep feeling like you are going to faint.  Have a severe headache.  Have fever or chills that get worse.  These symptoms may represent a serious problem that is an emergency. Do not wait to see if the symptoms will go away. Get medical help right away. Call your local emergency services (911 in the [place] Do not drive yourself to the hospital.  Summary  Acute bronchitis is sudden (acute) inflammation of the air tubes (bronchi) between the windpipe and the lungs. In adults, acute bronchitis usually goes away within 2 weeks, although coughing may last 3 weeks or longer  Take over-the-counter and prescription medicines only as told by your health care provider.  Drink enough fluid to keep your urine pale yellow.  Contact a health care provider if your symptoms do not improve after 2 weeks of treatment.  Get help right away if you cough up blood, faint, or have chest pain or shortness of breath.  This information is not intended to replace advice given to you by your health care provider. Make sure you discuss any questions you have with your health care provider.  Document Revised: 08/31/2020 Document Reviewed: 07/10/2020  [employer] Patient Education © 2021 [employer]

## 2022-09-19 ENCOUNTER — OFFICE VISIT (OUTPATIENT)
Dept: FAMILY MEDICINE CLINIC | Facility: CLINIC | Age: 32
End: 2022-09-19

## 2022-09-19 VITALS
HEIGHT: 61 IN | OXYGEN SATURATION: 99 % | WEIGHT: 166 LBS | HEART RATE: 77 BPM | TEMPERATURE: 98.1 F | BODY MASS INDEX: 31.34 KG/M2 | SYSTOLIC BLOOD PRESSURE: 110 MMHG | DIASTOLIC BLOOD PRESSURE: 80 MMHG

## 2022-09-19 DIAGNOSIS — K59.00 CONSTIPATION, UNSPECIFIED CONSTIPATION TYPE: ICD-10-CM

## 2022-09-19 DIAGNOSIS — R53.83 FATIGUE, UNSPECIFIED TYPE: Primary | ICD-10-CM

## 2022-09-19 DIAGNOSIS — R05.9 COUGH: ICD-10-CM

## 2022-09-19 DIAGNOSIS — R06.02 SHORTNESS OF BREATH: ICD-10-CM

## 2022-09-19 DIAGNOSIS — R39.15 URINARY URGENCY: ICD-10-CM

## 2022-09-19 LAB
BILIRUB BLD-MCNC: NEGATIVE MG/DL
CLARITY, POC: CLEAR
COLOR UR: ABNORMAL
EXPIRATION DATE: ABNORMAL
GLUCOSE UR STRIP-MCNC: NEGATIVE MG/DL
KETONES UR QL: ABNORMAL
LEUKOCYTE EST, POC: NEGATIVE
Lab: ABNORMAL
NITRITE UR-MCNC: NEGATIVE MG/ML
PH UR: 6 [PH] (ref 5–8)
PROT UR STRIP-MCNC: NEGATIVE MG/DL
RBC # UR STRIP: NEGATIVE /UL
SP GR UR: 1.03 (ref 1–1.03)
UROBILINOGEN UR QL: ABNORMAL

## 2022-09-19 PROCEDURE — 81015 MICROSCOPIC EXAM OF URINE: CPT | Performed by: NURSE PRACTITIONER

## 2022-09-19 PROCEDURE — 87086 URINE CULTURE/COLONY COUNT: CPT | Performed by: NURSE PRACTITIONER

## 2022-09-19 PROCEDURE — 99214 OFFICE O/P EST MOD 30 MIN: CPT | Performed by: NURSE PRACTITIONER

## 2022-09-19 PROCEDURE — 81003 URINALYSIS AUTO W/O SCOPE: CPT | Performed by: NURSE PRACTITIONER

## 2022-09-19 NOTE — PROGRESS NOTES
"Chief Complaint  Fatigue (X 5 weeks) and Shortness of Breath (X 5 weeks  )    Subjective          Sofiya Almaguer presents to [facility] [place]    History of Present Illness  [facility]    PCP: LEEANN Bennett    CC: \"fatigue, shortness of breath, constipation, uti\"    Seen via telehealth visit on 8- and treated for lower respiratory infection with Doxycycline, Medrol.  Reports she is still having some dyspnea, using inhaler as needed, but mostly at night.  No recent fever.  Reports she had done multiple home Covid tests that were all negative, but states her kids did test + for Covid the week prior to her symptoms starting.  Mostly c/o fatigue at this point.  Hx of Vit D deficiency, taking supplement daily. Unsure of B12 or iron deficiency, has started a B complex due to fatigue.  Just feels like she could go to sleep at anytime.  No wheezing noted.  No chest pain, but has pain noted to bilateral lower rib cage.  Also reports UTI symptoms for the last 1-2 weeks with frequency and urgency.  Has been constipated and only getting small ball like stools every few days.  Stool has been a green color.  Denies any localized abdominal pain, nausea or vomiting.        Review of Systems   Constitutional: Negative.    HENT: Negative.    Eyes: Negative.    Respiratory: Positive for cough, chest tightness and shortness of breath. Negative for wheezing.    Cardiovascular: Negative.    Gastrointestinal: Positive for constipation. Negative for abdominal pain, nausea and vomiting.   Genitourinary: Positive for flank pain (left flank, especially at night), frequency and urgency. Negative for dysuria.   Skin: Negative.    Neurological: Negative for dizziness and headaches.        Objective   Vital Signs:   /80 (BP Location: Left arm, Patient Position: Sitting, Cuff Size: Adult)   Pulse 77   Temp 98.1 °F (36.7 °C)   Ht 154.9 cm (61\")   Wt 75.3 kg " (166 lb)   SpO2 99%   BMI 31.37 kg/m²       Physical Exam  Vitals and nursing note reviewed.   Constitutional:       General: She is not in acute distress.     Appearance: She is not ill-appearing.   HENT:      Head: Normocephalic and atraumatic.      Right Ear: Ear canal normal. Tympanic membrane is scarred. Tympanic membrane is not erythematous.      Left Ear: Tympanic membrane is scarred and perforated (old). Tympanic membrane is not erythematous.      Nose: Nose normal.      Right Sinus: No maxillary sinus tenderness or frontal sinus tenderness.      Left Sinus: No maxillary sinus tenderness or frontal sinus tenderness.      Mouth/Throat:      Mouth: Mucous membranes are moist.      Pharynx: Oropharynx is clear. No pharyngeal swelling, oropharyngeal exudate or posterior oropharyngeal erythema.   Eyes:      General:         Right eye: No discharge.         Left eye: No discharge.      Conjunctiva/sclera: Conjunctivae normal.   Cardiovascular:      Rate and Rhythm: Normal rate and regular rhythm.   Pulmonary:      Effort: Pulmonary effort is normal. No respiratory distress.      Breath sounds: No wheezing, rhonchi or rales.      Comments: Minimally decreased aeration, dry cough    Abdominal:      General: Bowel sounds are normal.      Palpations: Abdomen is soft.      Tenderness: There is abdominal tenderness (mild, generalized TTP). There is left CVA tenderness ( mild). There is no right CVA tenderness, guarding or rebound.   Musculoskeletal:      Cervical back: Neck supple. No tenderness.   Lymphadenopathy:      Cervical: No cervical adenopathy.   Skin:     General: Skin is warm and dry.      Findings: No rash.   Neurological:      General: No focal deficit present.      Mental Status: She is alert and oriented to person, place, and time.   Psychiatric:         Mood and Affect: Mood normal.         Thought Content: Thought content normal.          Result Review :     Common labs    Common Labsle 2/28/22  2/28/22 2/28/22 2/28/22    1049 1049 1049 1049   Glucose    88   BUN    11   Creatinine    0.81   Sodium    141   Potassium    4.5   Chloride    106   Calcium    9.4   Albumin    4.10   Total Bilirubin    0.4   Alkaline Phosphatase    77   AST (SGOT)    18   ALT (SGPT)    28   WBC 5.34      Hemoglobin 14.8      Hematocrit 42.7      Platelets 260      Total Cholesterol   191    Triglycerides   69    HDL Cholesterol   51    LDL Cholesterol    127 (A)    Hemoglobin A1C  4.90     (A) Abnormal value            TSH    TSH 2/28/22   TSH 2.690           Recent Results (from the past 24 hour(s))   POCT urinalysis dipstick, automated    Collection Time: 09/19/22  4:18 PM    Specimen: Urine   Result Value Ref Range    Color Dark Yellow Yellow, Straw, Dark Yellow, Bety    Clarity, UA Clear Clear    Specific Gravity  1.030 1.005 - 1.030    pH, Urine 6.0 5.0 - 8.0    Leukocytes Negative Negative    Nitrite, UA Negative Negative    Protein, POC Negative Negative mg/dL    Glucose, UA Negative Negative mg/dL    Ketones, UA 1+ (A) Negative    Urobilinogen, UA 0.2 E.U./dL Normal, 0.2 E.U./dL    Bilirubin Negative Negative    Blood, UA Negative Negative    Lot Number [id number]     Expiration Date 05/10/2023      XR Chest PA & Lateral    Result Date: 9/19/2022  No acute cardiopulmonary disease Electronically signed by:  Gene Murphy MD  9/19/2022 3:45 PM CDT Workstation: [id number]              Assessment and Plan    Diagnoses and all orders for this visit:    1. Fatigue, unspecified type (Primary)  -     CBC No Differential; Future  -     Comprehensive metabolic panel; Future  -     Vitamin B12; Future  -     Folate; Future  -     Iron Profile; Future    2. Cough  -     XR Chest PA & Lateral    3. Shortness of breath  -     XR Chest PA & Lateral    4. Urinary urgency  -     POCT urinalysis dipstick, automated  -     Urinalysis, Microscopic Only - Urine, Clean Catch  -     Urine Culture - Urine, Urine, Clean Catch    5.  Constipation, unspecified constipation type      CXR/UA okay.  Additional work up as above.    Continue with inhaler PRN.     Recommended treatment for constipation with Miralax daily--will get OTC  Will see if urinary urgency, abdominal pain improves once constipation is treated    See PCP or RTC if symptoms persist/worsen  See PCP for routine f/u visit and management of chronic medical conditions      This document has been electronically signed by LEEANN Pineda on September 19, 2022 16:55 CDT,.    I spent 35 minutes caring for Sofiya on this date of service. This time includes time spent by me in the following activities:preparing for the visit, reviewing tests, performing a medically appropriate examination and/or evaluation , counseling and educating the patient/family/caregiver, ordering medications, tests, or procedures and documenting information in the medical record

## 2022-09-20 ENCOUNTER — LAB (OUTPATIENT)
Dept: LAB | Facility: HOSPITAL | Age: 32
End: 2022-09-20

## 2022-09-20 DIAGNOSIS — R53.83 FATIGUE, UNSPECIFIED TYPE: ICD-10-CM

## 2022-09-20 LAB
BACTERIA UR QL AUTO: ABNORMAL /HPF
COD CRY URNS QL: ABNORMAL /HPF
HYALINE CASTS UR QL AUTO: ABNORMAL /LPF
RBC # UR STRIP: ABNORMAL /HPF
REF LAB TEST METHOD: ABNORMAL
SQUAMOUS #/AREA URNS HPF: ABNORMAL /HPF
WBC # UR STRIP: ABNORMAL /HPF

## 2022-09-20 PROCEDURE — 85027 COMPLETE CBC AUTOMATED: CPT | Performed by: NURSE PRACTITIONER

## 2022-09-20 PROCEDURE — 83540 ASSAY OF IRON: CPT | Performed by: NURSE PRACTITIONER

## 2022-09-20 PROCEDURE — 82607 VITAMIN B-12: CPT | Performed by: NURSE PRACTITIONER

## 2022-09-20 PROCEDURE — 84466 ASSAY OF TRANSFERRIN: CPT | Performed by: NURSE PRACTITIONER

## 2022-09-20 PROCEDURE — 80053 COMPREHEN METABOLIC PANEL: CPT | Performed by: NURSE PRACTITIONER

## 2022-09-20 PROCEDURE — 82746 ASSAY OF FOLIC ACID SERUM: CPT | Performed by: NURSE PRACTITIONER

## 2022-09-21 LAB
ALBUMIN SERPL-MCNC: 4.1 G/DL (ref 3.5–5.2)
ALBUMIN/GLOB SERPL: 1.6 G/DL
ALP SERPL-CCNC: 64 U/L (ref 39–117)
ALT SERPL W P-5'-P-CCNC: 28 U/L (ref 1–33)
ANION GAP SERPL CALCULATED.3IONS-SCNC: 10.7 MMOL/L (ref 5–15)
AST SERPL-CCNC: 20 U/L (ref 1–32)
BACTERIA SPEC AEROBE CULT: NO GROWTH
BILIRUB SERPL-MCNC: 0.3 MG/DL (ref 0–1.2)
BUN SERPL-MCNC: 6 MG/DL (ref 6–20)
BUN/CREAT SERPL: 10.5 (ref 7–25)
CALCIUM SPEC-SCNC: 8.6 MG/DL (ref 8.6–10.5)
CHLORIDE SERPL-SCNC: 101 MMOL/L (ref 98–107)
CO2 SERPL-SCNC: 22.3 MMOL/L (ref 22–29)
CREAT SERPL-MCNC: 0.57 MG/DL (ref 0.57–1)
DEPRECATED RDW RBC AUTO: 38.9 FL (ref 37–54)
EGFRCR SERPLBLD CKD-EPI 2021: 122.5 ML/MIN/1.73
ERYTHROCYTE [DISTWIDTH] IN BLOOD BY AUTOMATED COUNT: 12.7 % (ref 12.3–15.4)
FOLATE SERPL-MCNC: 15.3 NG/ML (ref 4.78–24.2)
GLOBULIN UR ELPH-MCNC: 2.5 GM/DL
GLUCOSE SERPL-MCNC: 81 MG/DL (ref 65–99)
HCT VFR BLD AUTO: 37.5 % (ref 34–46.6)
HGB BLD-MCNC: 12.8 G/DL (ref 12–15.9)
IRON 24H UR-MRATE: 59 MCG/DL (ref 37–145)
IRON SATN MFR SERPL: 14 % (ref 20–50)
MCH RBC QN AUTO: 29.1 PG (ref 26.6–33)
MCHC RBC AUTO-ENTMCNC: 34.1 G/DL (ref 31.5–35.7)
MCV RBC AUTO: 85.2 FL (ref 79–97)
PLATELET # BLD AUTO: 290 10*3/MM3 (ref 140–450)
PMV BLD AUTO: 10.6 FL (ref 6–12)
POTASSIUM SERPL-SCNC: 4.2 MMOL/L (ref 3.5–5.2)
PROT SERPL-MCNC: 6.6 G/DL (ref 6–8.5)
RBC # BLD AUTO: 4.4 10*6/MM3 (ref 3.77–5.28)
SODIUM SERPL-SCNC: 134 MMOL/L (ref 136–145)
TIBC SERPL-MCNC: 410 MCG/DL (ref 298–536)
TRANSFERRIN SERPL-MCNC: 275 MG/DL (ref 200–360)
VIT B12 BLD-MCNC: 334 PG/ML (ref 211–946)
WBC NRBC COR # BLD: 6.72 10*3/MM3 (ref 3.4–10.8)

## 2022-09-22 DIAGNOSIS — N30.90 CYSTITIS: Primary | ICD-10-CM

## 2022-09-22 RX ORDER — NITROFURANTOIN 25; 75 MG/1; MG/1
100 CAPSULE ORAL 2 TIMES DAILY
Qty: 14 CAPSULE | Refills: 0 | Status: SHIPPED | OUTPATIENT
Start: 2022-09-22 | End: 2022-11-09

## 2022-09-22 RX ORDER — PHENAZOPYRIDINE HYDROCHLORIDE 200 MG/1
200 TABLET, FILM COATED ORAL 2 TIMES DAILY
Qty: 6 TABLET | Refills: 0 | Status: SHIPPED | OUTPATIENT
Start: 2022-09-22 | End: 2022-11-09

## 2022-10-24 ENCOUNTER — INITIAL PRENATAL (OUTPATIENT)
Dept: OBSTETRICS AND GYNECOLOGY | Facility: CLINIC | Age: 32
End: 2022-10-24

## 2022-10-24 VITALS — SYSTOLIC BLOOD PRESSURE: 124 MMHG | BODY MASS INDEX: 31.18 KG/M2 | DIASTOLIC BLOOD PRESSURE: 70 MMHG | WEIGHT: 165 LBS

## 2022-10-24 DIAGNOSIS — Z34.91 INITIAL OBSTETRIC VISIT IN FIRST TRIMESTER: Primary | ICD-10-CM

## 2022-10-24 DIAGNOSIS — Z12.4 ENCOUNTER FOR PAPANICOLAOU SMEAR FOR CERVICAL CANCER SCREENING: ICD-10-CM

## 2022-10-24 DIAGNOSIS — Z32.01 PREGNANCY EXAMINATION OR TEST, POSITIVE RESULT: ICD-10-CM

## 2022-10-24 DIAGNOSIS — O36.80X0 ENCOUNTER TO DETERMINE FETAL VIABILITY OF PREGNANCY, SINGLE OR UNSPECIFIED FETUS: ICD-10-CM

## 2022-10-24 DIAGNOSIS — Z34.80 PRENATAL CARE OF MULTIGRAVIDA, ANTEPARTUM: ICD-10-CM

## 2022-10-24 LAB
B-HCG UR QL: POSITIVE
EXPIRATION DATE: 0
INTERNAL NEGATIVE CONTROL: NEGATIVE
INTERNAL POSITIVE CONTROL: POSITIVE
Lab: 0

## 2022-10-24 PROCEDURE — 0501F PRENATAL FLOW SHEET: CPT | Performed by: NURSE PRACTITIONER

## 2022-10-24 PROCEDURE — 81025 URINE PREGNANCY TEST: CPT | Performed by: NURSE PRACTITIONER

## 2022-10-28 ENCOUNTER — LAB (OUTPATIENT)
Dept: LAB | Facility: HOSPITAL | Age: 32
End: 2022-10-28

## 2022-10-28 LAB
ABO GROUP BLD: NORMAL
AMPHET+METHAMPHET UR QL: NEGATIVE
AMPHETAMINES UR QL: NEGATIVE
BARBITURATES UR QL SCN: NEGATIVE
BENZODIAZ UR QL SCN: NEGATIVE
BLD GP AB SCN SERPL QL: NEGATIVE
BUPRENORPHINE SERPL-MCNC: NEGATIVE NG/ML
CANNABINOIDS SERPL QL: NEGATIVE
COCAINE UR QL: NEGATIVE
Lab: NORMAL
METHADONE UR QL SCN: NEGATIVE
OPIATES UR QL: NEGATIVE
OXYCODONE UR QL SCN: NEGATIVE
PCP UR QL SCN: NEGATIVE
PROPOXYPH UR QL: NEGATIVE
RH BLD: POSITIVE
TRICYCLICS UR QL SCN: NEGATIVE

## 2022-10-28 PROCEDURE — 87591 N.GONORRHOEAE DNA AMP PROB: CPT | Performed by: NURSE PRACTITIONER

## 2022-10-28 PROCEDURE — 87086 URINE CULTURE/COLONY COUNT: CPT | Performed by: NURSE PRACTITIONER

## 2022-10-28 PROCEDURE — 87661 TRICHOMONAS VAGINALIS AMPLIF: CPT | Performed by: NURSE PRACTITIONER

## 2022-10-28 PROCEDURE — 87491 CHLMYD TRACH DNA AMP PROBE: CPT | Performed by: NURSE PRACTITIONER

## 2022-10-28 PROCEDURE — 80306 DRUG TEST PRSMV INSTRMNT: CPT | Performed by: NURSE PRACTITIONER

## 2022-10-28 PROCEDURE — 80081 OBSTETRIC PANEL INC HIV TSTG: CPT | Performed by: NURSE PRACTITIONER

## 2022-10-28 PROCEDURE — 81001 URINALYSIS AUTO W/SCOPE: CPT | Performed by: NURSE PRACTITIONER

## 2022-10-28 PROCEDURE — 86803 HEPATITIS C AB TEST: CPT | Performed by: NURSE PRACTITIONER

## 2022-10-28 NOTE — PROGRESS NOTES
[facility]  Obstetrics  Date of Service: 10/24/2022    CHIEF COMPLAINT:  New prenatal visit    HISTORY OF PRESENT ILLNESS:  Sofiya Almaguer is a 32 y.o. y/o  at 9w0d by LMP (Patient's last menstrual period was 2022 (approximate).).  This was a unplanned pregnancy and the patient is alone today.  Denies nausea with vomiting.  Reports breast tenderness.  She denies any vaginal bleeding.  She has started taking a prenatal vitamin. Had COVID and pneumonia in August. EPDS score 3/30.    REVIEW OF SYSTEMS  Review of Systems   Constitutional: Negative for chills, fatigue, fever, unexpected weight gain and unexpected weight loss.   Respiratory: Negative for shortness of breath.    Cardiovascular: Negative for chest pain and palpitations.   Gastrointestinal: Negative for abdominal pain, constipation, diarrhea, nausea and vomiting.   Genitourinary: Negative for breast discharge, breast lump, breast pain, difficulty urinating, dysuria, frequency, urinary incontinence, vaginal bleeding, vaginal discharge and vaginal pain.   Skin: Negative for rash.   Neurological: Negative for weakness and headache.   Psychiatric/Behavioral: Negative for sleep disturbance, depressed mood and stress.       PRENATAL RISK FACTORS  10/22 Problems (from 10/24/22 to present)     No problems associated with this episode.          DATING CRITERIA:  LMP (2022) -- ELKIN 2023  1TUS-Pending    OBSTETRIC HISTORY:  OB History    Para Term  AB Living   6 5 5 0 0 5   SAB IAB Ectopic Molar Multiple Live Births   0 0 0   0 5      # Outcome Date GA Lbr Cem/2nd Weight Sex Delivery Anes PTL Lv   6 Current            5 Term 17 39w1d / 01:08 3771 g (8 lb 5 oz) M Vag-Forceps EPI N THEODORE      Birth Comments: TcB 6 at 24h   4 Term 2011 39w0d   M Vag-Vacuum EPI N THEODORE   3 Term 2009   3147 g (6 lb 15 oz) F Vag-Spont  N THEODORE   2 Term 2006   2693 g (5 lb 15 oz) F Vag-Spont  N THEODORE   1 Term 2005  39w2d   M Vag-Vacuum  N THEODORE     GYN HISTORY:  Denies h/o sexually transmitted infections/pelvic inflammatory disease  Hx of Leep cone biopsy in 2009  Last pap smear:  2017, per pt  Last Completed Pap Smear     This patient has no relevant Health Maintenance data.        PAST MEDICAL HISTORY:  Past Medical History:   Diagnosis Date   • Abnormal liver function    • Abnormal Pap smear of cervix    • Abnormal weight gain    • Acne    • Cervical intraepithelial neoplasia grade 2     h/o   • Contraception    • Ear infection     bilateral   • Headache    • Hyperlipidemia    • Vitamin D deficiency      PAST SURGICAL HISTORY:  Past Surgical History:   Procedure Laterality Date   • ADENOIDECTOMY     • CERVICAL BIOPSY  W/ LOOP ELECTRODE EXCISION     • INCISION AND DRAINAGE ABSCESS  05/30/2012    simple I&D   • LEEP  03/22/2007    LEEP cone biopsy. moderate cervical dysplasia   • OTHER SURGICAL HISTORY  11/30/2011    remove impacted cerumen   • TONSILLECTOMY     • WISDOM TOOTH EXTRACTION      only had 1 removed     FAMILY HISTORY:  Family History   Problem Relation Age of Onset   • Bipolar disorder Mother    • Depression Mother         depressive disorder   • Diabetes Mother    • Hyperlipidemia Mother    • Hypertension Mother    • Asthma Mother    • Breast cancer Other    • Colon cancer Other    • Diabetes Maternal Grandmother    • Hypertension Maternal Grandmother    • Fibromyalgia Maternal Grandmother    • Colon cancer Maternal Grandfather    • Diabetes Maternal Grandfather    • Alcohol abuse Father    • Cancer Sister    • Autism Brother    • Liver disease Paternal Grandmother    • No Known Problems Son    • No Known Problems Son    • No Known Problems Son    • No Known Problems Daughter    • No Known Problems Daughter      SOCIAL HISTORY:  Social History     Socioeconomic History   • Marital status: Single   Tobacco Use   • Smoking status: Never   • Smokeless tobacco: Never   Vaping Use   • Vaping Use: Never used   Substance  and Sexual Activity   • Alcohol use: No   • Drug use: Never   • Sexual activity: Defer     Partners: Male     GENETIC SCREENING:  Age >36 yo as of ELKIN: No  Thalassemia: No  NTD: No  CHD: No  Down Syndrome/MR/Fragile X/Autism: No  Ashkenazi Roman Catholic with Vlad-Sachs, [place], familial dysautonomia: No  Sickle cell disease or trait: No  Hemophilia: No  Muscular dystrophy: No  Cystic fibrosis: No  Blackford's chorea: No  Birth defects: No  Genetic/chromosomal disorders: No    INFECTION HISTORY:  TB exposure: No  HSV: No  Illness since LMP: No  Prior GBS infected child: No  STIs: No    ALLERGIES:  No Known Allergies    MEDICATIONS:  Prior to Admission medications    Medication Sig Start Date End Date Taking? Authorizing Provider   Cyanocobalamin (VITAMIN B-12 PO) Take  by mouth.   Yes ProviderDwight MD   Prenatal Vit-Fe Fumarate-FA (PRENATAL PO) Take  by mouth.   Yes ProviderDwight MD   VITAMIN D PO Take  by mouth.   Yes ProviderDwight MD   acyclovir (Zovirax) 5 % ointment Apply 1 application topically to the appropriate area as directed Every 4 (Four) Hours. 6/28/22   Eva Jon APRN   aspirin-acetaminophen-caffeine (EXCEDRIN MIGRAINE) 250-250-65 MG per tablet Take 1 tablet by mouth Every 6 (Six) Hours As Needed for Headache.    Provider, MD Dwight   clindamycin-benzoyl peroxide (BENZACLIN) 1-5 % gel Apply  topically to the appropriate area as directed 2 (Two) Times a Day. 2/23/22   Eva Jon APRN   Diclofenac Epolamine (Flector) 1.3 % patch patch Apply 1 patch topically to the appropriate area as directed 2 (Two) Times a Day. 2/23/22   Eva Jon APRN   etonogestrel-ethinyl estradiol (NuvaRing) 0.12-0.015 MG/24HR vaginal ring Insert vaginally and leave in place for 3 consecutive weeks, then remove for 1 week. 2/23/22   Eva Jon APRN   ibuprofen (ADVIL,MOTRIN) 800 MG tablet Take 1 tablet by mouth Every 8 (Eight) Hours As Needed for Mild Pain . 2/23/22   Eva Jon APRN    nitrofurantoin, macrocrystal-monohydrate, (Macrobid) 100 MG capsule Take 1 capsule by mouth 2 (Two) Times a Day. 9/22/22   Juan Bacon APRN   nystatin 366036 UNIT/GM powder  5/6/21   Provider, MD Dwight   phenazopyridine (Pyridium) 200 MG tablet Take 1 tablet by mouth 2 (Two) Times a Day. 9/22/22   Juan Bacon APRN   promethazine-dextromethorphan (PROMETHAZINE-DM) 6.25-15 MG/5ML syrup Take 5 mL by mouth 4 (Four) Times a Day As Needed for Cough. 8/31/22   Mandy Fritz APRN       PHYSICAL EXAM:   /70   Wt 74.8 kg (165 lb)   LMP 08/26/2022 (Approximate)   BMI 31.18 kg/m²   General: Alert, healthy, no distress, well nourished and well developed.  Neurologic: Alert, oriented to person, place, and time.  Gait normal.  Cranial nerves II-XII grossly intact.  HEENT: Normocephalic, atraumatic.  Extraocular muscles intact, pupils equal and reactive x2.    Teeth: Normal hygiene.  Neck: Supple, no adenopathy, thyroid normal size, non-tender, without nodularity, trachea midline.  Breasts: Defrred  Lungs: Normal respiratory effort.  Clear to auscultation bilaterally.  No wheezes, rhonci, or rales.  Heart: Regular rate and rhythm.  No murmer, rub or gallop.  Abdomen: Soft, non-tender, non-distended,no masses, no hepatosplenomegaly, no hernia.  Skin: No rash, no lesions.  Extremities: No cyanosis, clubbing or edema.  PELVIC EXAM:  External Genitalia/Vulva: Anatomy is normal, no significant redness of labia, no discharge on vulvar tissues, [place] Walk's and Bartholin's glands are normal, no ulcers, no condylomatous lesions.  Urethra: Normal, no lesions.  Vagina: Vaginal tissues are not inflamed, normal color and texture, no significant discharge present. Could not obtained Pap test due to vaginal wall prolapse and pt in pain.   Cervix: Could not visualized well.   Uterus: Deferred  Adnexa: Deferred    Bedside ultrasound performed by myself which shows the findings below: single IUP with cardiac activity visualized  with Vscan.       IMPRESSION:  Sofiya Almaguer is a 32 y.o.  at 9w0d for a new prenatal visit.    PLAN:  1.  IUP at 9w0d  - Options counseling performed and patient desires continuation of pregnancy to term   - Prenatal labs ordered  - Genetic testing, including cystic fibrosis, was discussed and patient will consider.  - Continue prenatal vitamins  - Weight gain counseling performed.   - Pregravid BMI >30: Recommend 11-20 lb   - Pap test at 2nd visit  - Needs OB dating US   - Return to clinic in 2  weeks for return prenatal visit and dating U/S       Diagnosis Plan   1. Initial obstetric visit in first trimester        2. Pregnancy examination or test, positive result  POC Pregnancy, Urine    US Ob < 14 Weeks Single or First Gestation      3. Prenatal care of multigravida, antepartum  OB Panel With HIV    Urine Drug Screen - Urine, Clean Catch    Urine Culture - Urine, Urine, Clean Catch    Chlamydia trachomatis, Neisseria gonorrhoeae, Trichomonas vaginalis, PCR - Urine, Urine, Clean Catch    Urinalysis With Microscopic - Urine, Clean Catch    US Ob < 14 Weeks Single or First Gestation      4. Encounter to determine fetal viability of pregnancy, single or unspecified fetus  US Ob < 14 Weeks Single or First Gestation      5. Encounter for Papanicolaou smear for cervical cancer screening          Aliya Ornelas, APRN  10/28/2022  15:23 CDT

## 2022-10-29 LAB
BACTERIA SPEC AEROBE CULT: NORMAL
BACTERIA UR QL AUTO: ABNORMAL /HPF
BASOPHILS # BLD AUTO: 0.02 10*3/MM3 (ref 0–0.2)
BASOPHILS NFR BLD AUTO: 0.3 % (ref 0–1.5)
BILIRUB UR QL STRIP: NEGATIVE
C TRACH RRNA CVX QL NAA+PROBE: NEGATIVE
CLARITY UR: ABNORMAL
COD CRY URNS QL: ABNORMAL /HPF
COLOR UR: YELLOW
DEPRECATED RDW RBC AUTO: 42.1 FL (ref 37–54)
EOSINOPHIL # BLD AUTO: 0.07 10*3/MM3 (ref 0–0.4)
EOSINOPHIL NFR BLD AUTO: 1 % (ref 0.3–6.2)
ERYTHROCYTE [DISTWIDTH] IN BLOOD BY AUTOMATED COUNT: 13.2 % (ref 12.3–15.4)
GLUCOSE UR STRIP-MCNC: NEGATIVE MG/DL
HBV SURFACE AG SERPL QL IA: NORMAL
HCT VFR BLD AUTO: 38.9 % (ref 34–46.6)
HCV AB SER DONR QL: NORMAL
HGB BLD-MCNC: 13 G/DL (ref 12–15.9)
HGB UR QL STRIP.AUTO: NEGATIVE
HIV1+2 AB SER QL: NORMAL
HYALINE CASTS UR QL AUTO: ABNORMAL /LPF
IMM GRANULOCYTES # BLD AUTO: 0.04 10*3/MM3 (ref 0–0.05)
IMM GRANULOCYTES NFR BLD AUTO: 0.6 % (ref 0–0.5)
KETONES UR QL STRIP: ABNORMAL
LEUKOCYTE ESTERASE UR QL STRIP.AUTO: ABNORMAL
LYMPHOCYTES # BLD AUTO: 1.12 10*3/MM3 (ref 0.7–3.1)
LYMPHOCYTES NFR BLD AUTO: 15.6 % (ref 19.6–45.3)
MCH RBC QN AUTO: 29.2 PG (ref 26.6–33)
MCHC RBC AUTO-ENTMCNC: 33.4 G/DL (ref 31.5–35.7)
MCV RBC AUTO: 87.4 FL (ref 79–97)
MONOCYTES # BLD AUTO: 0.34 10*3/MM3 (ref 0.1–0.9)
MONOCYTES NFR BLD AUTO: 4.7 % (ref 5–12)
N GONORRHOEA RRNA SPEC QL NAA+PROBE: NEGATIVE
NEUTROPHILS NFR BLD AUTO: 5.61 10*3/MM3 (ref 1.7–7)
NEUTROPHILS NFR BLD AUTO: 77.8 % (ref 42.7–76)
NITRITE UR QL STRIP: NEGATIVE
NRBC BLD AUTO-RTO: 0 /100 WBC (ref 0–0.2)
PH UR STRIP.AUTO: 5.5 [PH] (ref 5–8)
PLATELET # BLD AUTO: 253 10*3/MM3 (ref 140–450)
PMV BLD AUTO: 10.7 FL (ref 6–12)
PROT UR QL STRIP: ABNORMAL
RBC # BLD AUTO: 4.45 10*6/MM3 (ref 3.77–5.28)
RBC # UR STRIP: ABNORMAL /HPF
REF LAB TEST METHOD: ABNORMAL
RPR SER QL: NORMAL
SP GR UR STRIP: 1.03 (ref 1–1.03)
SQUAMOUS #/AREA URNS HPF: ABNORMAL /HPF
TRICHOMONAS VAGINALIS PCR: NEGATIVE
UNIDENT CRYS URNS QL MICRO: ABNORMAL /HPF
UROBILINOGEN UR QL STRIP: ABNORMAL
WBC # UR STRIP: ABNORMAL /HPF
WBC NRBC COR # BLD: 7.2 10*3/MM3 (ref 3.4–10.8)

## 2022-10-30 LAB — RUBV IGG SERPL IA-ACNC: 4.12 INDEX

## 2022-11-09 ENCOUNTER — ROUTINE PRENATAL (OUTPATIENT)
Dept: OBSTETRICS AND GYNECOLOGY | Facility: CLINIC | Age: 32
End: 2022-11-09

## 2022-11-09 ENCOUNTER — LAB (OUTPATIENT)
Dept: LAB | Facility: HOSPITAL | Age: 32
End: 2022-11-09

## 2022-11-09 VITALS — WEIGHT: 161.2 LBS | DIASTOLIC BLOOD PRESSURE: 78 MMHG | SYSTOLIC BLOOD PRESSURE: 116 MMHG | BODY MASS INDEX: 30.46 KG/M2

## 2022-11-09 DIAGNOSIS — Z3A.16 16 WEEKS GESTATION OF PREGNANCY: ICD-10-CM

## 2022-11-09 DIAGNOSIS — Z36.89 ENCOUNTER FOR FETAL ANATOMIC SURVEY: ICD-10-CM

## 2022-11-09 DIAGNOSIS — O99.210 OBESITY IN PREGNANCY, ANTEPARTUM: ICD-10-CM

## 2022-11-09 DIAGNOSIS — O99.210 OBESITY IN PREGNANCY, ANTEPARTUM: Primary | ICD-10-CM

## 2022-11-09 PROBLEM — Z30.46 ENCOUNTER FOR NEXPLANON REMOVAL: Status: RESOLVED | Noted: 2019-09-19 | Resolved: 2022-11-09

## 2022-11-09 LAB — GLUCOSE 1H P 100 G GLC PO SERPL-MCNC: 182 MG/DL (ref 65–139)

## 2022-11-09 PROCEDURE — 82950 GLUCOSE TEST: CPT

## 2022-11-09 PROCEDURE — 99213 OFFICE O/P EST LOW 20 MIN: CPT | Performed by: NURSE PRACTITIONER

## 2022-11-09 PROCEDURE — 36415 COLL VENOUS BLD VENIPUNCTURE: CPT

## 2022-11-09 NOTE — PROGRESS NOTES
CC: Prenatal visit    Sofiya Almaguer is a 32 y.o.  at 16w6d.  Doing well.  Denies contractions, LOF, or VB.  Dating U/S today demonstrates a 16w6d single IUP, not c/w with LMP. Final ELKIN of 2023. Pt desires to defer pap test till postpartum. Declines NIPS and carrier screening.     /78   Wt 73.1 kg (161 lb 3.2 oz)   LMP 2022 (Approximate)   BMI 30.46 kg/m²   SVE: Deferred     Fetal Heart Rate: 143 us    10/22 Problems (from 10/24/22 to present)     Problem Noted Resolved    Obesity in pregnancy, antepartum 2022 by Aliya Simmons, LEEANN No          A/P: Sofiya Almaguer is a 32 y.o.  at 16w6d.  - Anatomy US in 4 weeks and meet delivering provider  - RTC in 4 weeks  - Reviewed COVID-19 visitation policy  - Reviewed COVID-19 precautions     Diagnosis Plan   1. Obesity in pregnancy, antepartum  Glucose, Post 50 Gm Glucola    US Ob 14 + Weeks Single or First Gestation      2. Encounter for fetal anatomic survey  US Ob 14 + Weeks Single or First Gestation      3. 16 weeks gestation of pregnancy          LEEANN Garduno  2022  17:08 CST

## 2022-11-10 DIAGNOSIS — O99.810 ABNORMAL GLUCOSE TOLERANCE TEST (GTT) DURING PREGNANCY, ANTEPARTUM: Primary | ICD-10-CM

## 2022-11-16 ENCOUNTER — LAB (OUTPATIENT)
Dept: LAB | Facility: HOSPITAL | Age: 32
End: 2022-11-16

## 2022-11-16 DIAGNOSIS — O99.810 ABNORMAL GLUCOSE TOLERANCE TEST (GTT) DURING PREGNANCY, ANTEPARTUM: ICD-10-CM

## 2022-11-16 LAB
GLUCOSE P FAST SERPL-MCNC: 89 MG/DL (ref 65–94)
GTT GEST 2H PNL UR+SERPL: 158 MG/DL (ref 65–179)
GTT GEST 3H PNL SERPL: 114 MG/DL (ref 65–139)
GTT GEST 3H PNL SERPL: 139 MG/DL (ref 65–154)

## 2022-11-16 PROCEDURE — 82951 GLUCOSE TOLERANCE TEST (GTT): CPT

## 2022-11-16 PROCEDURE — 82952 GTT-ADDED SAMPLES: CPT

## 2022-11-16 PROCEDURE — 36415 COLL VENOUS BLD VENIPUNCTURE: CPT

## 2022-12-07 ENCOUNTER — ROUTINE PRENATAL (OUTPATIENT)
Dept: OBSTETRICS AND GYNECOLOGY | Facility: CLINIC | Age: 32
End: 2022-12-07

## 2022-12-07 VITALS — SYSTOLIC BLOOD PRESSURE: 122 MMHG | DIASTOLIC BLOOD PRESSURE: 68 MMHG | WEIGHT: 163 LBS | BODY MASS INDEX: 30.8 KG/M2

## 2022-12-07 DIAGNOSIS — O09.92 HIGH-RISK PREGNANCY IN SECOND TRIMESTER: ICD-10-CM

## 2022-12-07 DIAGNOSIS — O99.210 OBESITY IN PREGNANCY, ANTEPARTUM: Primary | ICD-10-CM

## 2022-12-07 PROCEDURE — 0502F SUBSEQUENT PRENATAL CARE: CPT | Performed by: STUDENT IN AN ORGANIZED HEALTH CARE EDUCATION/TRAINING PROGRAM

## 2022-12-07 NOTE — PROGRESS NOTES
CC: Prenatal visit    Sofiya Almaguer is a 32 y.o.  at 20w6d.  Doing well.  Denies contractions, LOF, or VB.  Reports good FM.    No reflux. States having some pressure in upper abdomen, fullness after eating. No constipation. Miralax prn    3 hr GTT WNL, will need repeat 3 hr 28 weeks    /68   Wt 73.9 kg (163 lb)   LMP 2022 (Approximate)   BMI 30.80 kg/m²   SVE: deferred     Fetal Heart Rate: 147 us    Prelim US: cephalic, posterior placenta, HYACINTH 14.81 cm,  bpm, BPD 57%, HC 14%, AC 19%, FL 75%, normal anatomy, girl    A/P: Sofiya Almaguer is a 32 y.o.  at 20w6d.  - RTC in 4 weeks  - Small frequent meals, prn treatment reflux, constipation, if worsening consider RUQ US and/or Reglan trial  - Reviewed COVID-19 visitation policy  - Reviewed COVID-19 precautions    Problem List Items Addressed This Visit        Gravid and     Obesity in pregnancy, antepartum - Primary    Overview     Passed early 3 hr glucola, repeat at 28 weeks.          High-risk pregnancy in second trimester          Diagnosis Plan   1. Obesity in pregnancy, antepartum        2. High-risk pregnancy in second trimester          Анна Andre DO  2022  22:04 CST

## 2022-12-19 PROBLEM — O09.92 HIGH-RISK PREGNANCY IN SECOND TRIMESTER: Status: ACTIVE | Noted: 2022-12-19

## 2022-12-20 DIAGNOSIS — R11.0 NAUSEA: ICD-10-CM

## 2022-12-20 RX ORDER — ONDANSETRON 8 MG/1
TABLET, ORALLY DISINTEGRATING ORAL
Qty: 30 TABLET | Refills: 0 | OUTPATIENT
Start: 2022-12-20

## 2022-12-20 NOTE — TELEPHONE ENCOUNTER
Rx Refill Note  Requested Prescriptions     Pending Prescriptions Disp Refills   • ondansetron ODT (ZOFRAN-ODT) 8 MG disintegrating tablet [Pharmacy Med Name: Ondansetron 8 MG Oral Tablet Disintegrating] 30 tablet 0     Sig: DISSOLVE 1 TABLET IN MOUTH EVERY 8 HOURS AS NEEDED FOR NAUSEA AND VOMITING      Last office visit with prescribing clinician: 2/23/2022   Last telemedicine visit with prescribing clinician: 2/24/2023   Next office visit with prescribing clinician: 2/24/2023   {TIP  Encounters:    discontinued on 12/28/2020 by Mandy Fritz APRN for the following reason: *Therapy completed                      Would you like a call back once the refill request has been completed: [] Yes [] No    If the office needs to give you a call back, can they leave a voicemail: [] Yes [] No    Liz Frederick LPN  12/20/22, 11:50 CST

## 2023-01-05 ENCOUNTER — ROUTINE PRENATAL (OUTPATIENT)
Dept: OBSTETRICS AND GYNECOLOGY | Facility: CLINIC | Age: 33
End: 2023-01-05
Payer: COMMERCIAL

## 2023-01-05 VITALS — BODY MASS INDEX: 30.8 KG/M2 | DIASTOLIC BLOOD PRESSURE: 70 MMHG | WEIGHT: 163 LBS | SYSTOLIC BLOOD PRESSURE: 116 MMHG

## 2023-01-05 DIAGNOSIS — O99.810 ABNORMAL GLUCOSE TOLERANCE TEST (GTT) DURING PREGNANCY, ANTEPARTUM: ICD-10-CM

## 2023-01-05 DIAGNOSIS — Z36.89 ENCOUNTER FOR OTHER SPECIFIED ANTENATAL SCREENING: ICD-10-CM

## 2023-01-05 DIAGNOSIS — Z3A.25 25 WEEKS GESTATION OF PREGNANCY: Primary | ICD-10-CM

## 2023-01-05 DIAGNOSIS — O99.210 OBESITY IN PREGNANCY, ANTEPARTUM: ICD-10-CM

## 2023-01-05 PROCEDURE — 99213 OFFICE O/P EST LOW 20 MIN: CPT | Performed by: NURSE PRACTITIONER

## 2023-01-05 NOTE — PROGRESS NOTES
CC: Prenatal visit    Sofiya Almaguer is a 32 y.o.  at 25w0d.  Doing well.  Denies N/V, dysuria, abnormal vaginal d/c, headaches, heartburn, constipation, cramping, regular contractions, LOF, or VB.  Reports good FM.     /70   Wt 73.9 kg (163 lb)   LMP 2022 (Approximate)   BMI 30.80 kg/m²   SVE: Deferred  Fundal Height (cm): 25 cm  Fetal Heart Rate: 140    10/22 Problems (from 10/24/22 to present)     Problem Noted Resolved    High-risk pregnancy in second trimester 2022 by Анна Andre,  No    Overview Addendum 2023 11:32 AM by Aliya Simmons APRN     Female fetus-no name yet  BC?  Breast/Formula?   Needs postpartup PAP         Obesity in pregnancy, antepartum 2022 by Aliya Simmons APRN No    Overview Signed 2022  2:10 PM by Aliya Simmons APRN     Passed early 3 hr glucola, repeat at 28 weeks.                A/P: Sofiya Almaguer is a 32 y.o.  at 25w0d.  - 3 hr OGTT and CBC in 3 weeks; appt given  - TDAP next visit  - RTC in 3 weeks  - Reviewed COVID-19 visitation policy  - Reviewed COVID-19 precautions     Diagnosis Plan   1. 25 weeks gestation of pregnancy        2. Obesity in pregnancy, antepartum        3. Abnormal glucose tolerance test (GTT) during pregnancy, antepartum  Glucose Tolerance, 3 Hours      4. Encounter for other specified  screening  CBC (No Diff)        LEEANN Garduno  2023  11:32 CST

## 2023-01-25 ENCOUNTER — LAB (OUTPATIENT)
Dept: LAB | Facility: HOSPITAL | Age: 33
End: 2023-01-25
Payer: COMMERCIAL

## 2023-01-25 ENCOUNTER — ROUTINE PRENATAL (OUTPATIENT)
Dept: OBSTETRICS AND GYNECOLOGY | Facility: CLINIC | Age: 33
End: 2023-01-25
Payer: COMMERCIAL

## 2023-01-25 VITALS — WEIGHT: 164 LBS | SYSTOLIC BLOOD PRESSURE: 108 MMHG | DIASTOLIC BLOOD PRESSURE: 72 MMHG | BODY MASS INDEX: 30.99 KG/M2

## 2023-01-25 DIAGNOSIS — Z36.89 ENCOUNTER FOR OTHER SPECIFIED ANTENATAL SCREENING: ICD-10-CM

## 2023-01-25 DIAGNOSIS — O24.410 DIET CONTROLLED GESTATIONAL DIABETES MELLITUS (GDM) IN THIRD TRIMESTER: ICD-10-CM

## 2023-01-25 DIAGNOSIS — O09.93 HIGH-RISK PREGNANCY IN THIRD TRIMESTER: ICD-10-CM

## 2023-01-25 DIAGNOSIS — O99.810 ABNORMAL GLUCOSE TOLERANCE TEST (GTT) DURING PREGNANCY, ANTEPARTUM: ICD-10-CM

## 2023-01-25 DIAGNOSIS — Z3A.27 27 WEEKS GESTATION OF PREGNANCY: Primary | ICD-10-CM

## 2023-01-25 DIAGNOSIS — O99.210 OBESITY IN PREGNANCY, ANTEPARTUM: ICD-10-CM

## 2023-01-25 LAB
DEPRECATED RDW RBC AUTO: 38.4 FL (ref 37–54)
ERYTHROCYTE [DISTWIDTH] IN BLOOD BY AUTOMATED COUNT: 12.6 % (ref 12.3–15.4)
GLUCOSE 1H P 100 G GLC PO SERPL-MCNC: 188 MG/DL (ref 74–180)
GLUCOSE 2H P 100 G GLC PO SERPL-MCNC: 180 MG/DL (ref 74–155)
GLUCOSE 3H P 100 G GLC PO SERPL-MCNC: 140 MG/DL (ref 74–140)
GLUCOSE P FAST SERPL-MCNC: 85 MG/DL (ref 74–106)
HCT VFR BLD AUTO: 33 % (ref 34–46.6)
HGB BLD-MCNC: 11.3 G/DL (ref 12–15.9)
MCH RBC QN AUTO: 29 PG (ref 26.6–33)
MCHC RBC AUTO-ENTMCNC: 34.2 G/DL (ref 31.5–35.7)
MCV RBC AUTO: 84.6 FL (ref 79–97)
PLATELET # BLD AUTO: 206 10*3/MM3 (ref 140–450)
PMV BLD AUTO: 11.3 FL (ref 6–12)
RBC # BLD AUTO: 3.9 10*6/MM3 (ref 3.77–5.28)
WBC NRBC COR # BLD: 6.97 10*3/MM3 (ref 3.4–10.8)

## 2023-01-25 PROCEDURE — 82951 GLUCOSE TOLERANCE TEST (GTT): CPT

## 2023-01-25 PROCEDURE — 85027 COMPLETE CBC AUTOMATED: CPT

## 2023-01-25 PROCEDURE — 99213 OFFICE O/P EST LOW 20 MIN: CPT | Performed by: NURSE PRACTITIONER

## 2023-01-25 PROCEDURE — 36415 COLL VENOUS BLD VENIPUNCTURE: CPT

## 2023-01-25 PROCEDURE — 82952 GTT-ADDED SAMPLES: CPT

## 2023-01-25 NOTE — PROGRESS NOTES
CC: Prenatal visit    Sofiya Almaguer is a 32 y.o.  at 27w6d.  Doing well.  Denies N/V, dysuria, abnormal vaginal d/c, headaches, heartburn, constipation, cramping, regular contractions, LOF, or VB.  Reports good FM.    /72   Wt 74.4 kg (164 lb)   LMP 2022 (Approximate)   BMI 30.99 kg/m²   SVE: Deferred  Fundal Height (cm): 28 cm  Fetal Heart Rate: 131    10/22 Problems (from 10/24/22 to present)     Problem Noted Resolved    Diet controlled gestational diabetes mellitus (GDM) in third trimester 2023 by Aliya Simmons APRN No    Overview Addendum 2023  2:04 PM by Aliya Simmons APRN     GS at 32 weeks and  testing at 36 weeks if diet controlled.   If medication needed, start at 32 weeks.          High-risk pregnancy in third trimester 2022 by Анна Andre DO No    Overview Addendum 2023  2:08 PM by Aliya Simmons APRN     Female fetus-no name yet  BC?  Breastfeeding/ pump rx on 2023  Needs postpartup PAP         Obesity in pregnancy, antepartum 2022 by Aliya Simmons APRN No          A/P: Sofiya Almaguer is a 32 y.o.  at 27w6d.  - Diagnosed GDM today with 2 elevated readings on 3hr OGTT. Counseled on potential complications/risk to pregnancy, fetus, and at delivery to include LGA/SGA, possible need for C/S if LGA, shoulder dystocia, hypoglycemia of the .  - Counseled on fingersticks QID and following a GDM diet. Declined nutrition counseling referral.   - Tdap counseling; pt states she will get it at work. Works at [employer].  - PTL and fetal kick count precautions  - RTC in 2 weeks; desires to be seen in [facility]       Diagnosis Plan   1. 27 weeks gestation of pregnancy        2. Obesity in pregnancy, antepartum        3. High-risk pregnancy in third trimester        4. Diet controlled gestational diabetes mellitus (GDM) in third trimester          LEEANN Garduno  2023  14:10  CST

## 2023-01-26 RX ORDER — UBIQUINOL 100 MG
CAPSULE ORAL
Qty: 200 EACH | Refills: 6 | Status: SHIPPED | OUTPATIENT
Start: 2023-01-26

## 2023-01-26 RX ORDER — LANCETS 30 GAUGE
EACH MISCELLANEOUS
Qty: 200 EACH | Refills: 6 | Status: SHIPPED | OUTPATIENT
Start: 2023-01-26

## 2023-01-26 RX ORDER — BLOOD-GLUCOSE METER
KIT MISCELLANEOUS
Qty: 1 EACH | Refills: 0 | Status: SHIPPED | OUTPATIENT
Start: 2023-01-26

## 2023-02-09 ENCOUNTER — ROUTINE PRENATAL (OUTPATIENT)
Dept: OBSTETRICS AND GYNECOLOGY | Facility: CLINIC | Age: 33
End: 2023-02-09
Payer: COMMERCIAL

## 2023-02-09 VITALS — SYSTOLIC BLOOD PRESSURE: 122 MMHG | BODY MASS INDEX: 30.8 KG/M2 | DIASTOLIC BLOOD PRESSURE: 64 MMHG | WEIGHT: 163 LBS

## 2023-02-09 DIAGNOSIS — O99.210 OBESITY IN PREGNANCY, ANTEPARTUM: ICD-10-CM

## 2023-02-09 DIAGNOSIS — O24.410 DIET CONTROLLED GESTATIONAL DIABETES MELLITUS (GDM) IN THIRD TRIMESTER: ICD-10-CM

## 2023-02-09 DIAGNOSIS — Z3A.30 30 WEEKS GESTATION OF PREGNANCY: ICD-10-CM

## 2023-02-09 DIAGNOSIS — O09.93 HIGH-RISK PREGNANCY IN THIRD TRIMESTER: Primary | ICD-10-CM

## 2023-02-09 PROCEDURE — 0502F SUBSEQUENT PRENATAL CARE: CPT | Performed by: NURSE PRACTITIONER

## 2023-02-09 NOTE — PROGRESS NOTES
CC: Prenatal visit    Sofiya Almaguer is a 32 y.o.  at 30w0d.  Doing well.  Denies N/V, dysuria, abnormal vaginal d/c, headaches, heartburn, constipation, cramping, regular contractions, LOF, or VB.  Reports good FM. Was able to get a Dexcom through her insurance with [employer]  For only $50 for the transmitter but she is also doing fingersticks QID as she does not completely trust the Dexcom.     Fastin-92, mostly in the mid 80s  1hr PP: 70-120s; one abnormal value of 143 in the last 2 weeks.     /64   Wt 73.9 kg (163 lb)   LMP 2022 (Approximate)   BMI 30.80 kg/m²   SVE: Deferred  Fundal Height (cm): 30 cm  Fetal Heart Rate: 140    10/22 Problems (from 10/24/22 to present)     Problem Noted Resolved    Diet controlled gestational diabetes mellitus (GDM) in third trimester 2023 by Aliya Simmons APRN No    Overview Addendum 2023  2:04 PM by Aliya Simmons APRN     GS at 32 weeks and  testing at 36 weeks if diet controlled.   If medication needed, start at 32 weeks.          High-risk pregnancy in third trimester 2022 by Анна Andre DO No    Overview Addendum 2023  2:08 PM by Aliya Simmons APRN     Female fetus-no name yet  BC?  Breastfeeding/ pump rx on 2023  Needs postpartup PAP         Obesity in pregnancy, antepartum 2022 by Aliya Simmons APRN No          A/P: Sofiya Almaguer is a 32 y.o.  at 30w0d.  - Continue QID fingersticks; great control of sugars at this time.   - Growth scan in 2 weeks  - PTL and fetal kick count precautions  - RTC in 2 weeks     Diagnosis Plan   1. High-risk pregnancy in third trimester  US Ob Follow Up Transabdominal Approach      2. Obesity in pregnancy, antepartum  US Ob Follow Up Transabdominal Approach      3. Diet controlled gestational diabetes mellitus (GDM) in third trimester  US Ob Follow Up Transabdominal Approach      4. 30 weeks gestation of pregnancy          Aliya  Johnson, APRN  2/9/2023  11:45 CST

## 2023-02-20 DIAGNOSIS — R12 HEARTBURN IN PREGNANCY IN THIRD TRIMESTER: Primary | ICD-10-CM

## 2023-02-20 DIAGNOSIS — O26.893 HEARTBURN IN PREGNANCY IN THIRD TRIMESTER: Primary | ICD-10-CM

## 2023-02-20 RX ORDER — FAMOTIDINE 20 MG/1
20 TABLET, FILM COATED ORAL DAILY
Qty: 30 TABLET | Refills: 1 | Status: SHIPPED | OUTPATIENT
Start: 2023-02-20 | End: 2024-02-20

## 2023-02-23 ENCOUNTER — ROUTINE PRENATAL (OUTPATIENT)
Dept: OBSTETRICS AND GYNECOLOGY | Facility: CLINIC | Age: 33
End: 2023-02-23
Payer: COMMERCIAL

## 2023-02-23 VITALS — SYSTOLIC BLOOD PRESSURE: 104 MMHG | WEIGHT: 161 LBS | DIASTOLIC BLOOD PRESSURE: 68 MMHG | BODY MASS INDEX: 30.42 KG/M2

## 2023-02-23 DIAGNOSIS — O09.93 HIGH-RISK PREGNANCY IN THIRD TRIMESTER: ICD-10-CM

## 2023-02-23 DIAGNOSIS — O99.210 OBESITY IN PREGNANCY, ANTEPARTUM: ICD-10-CM

## 2023-02-23 DIAGNOSIS — Z3A.32 32 WEEKS GESTATION OF PREGNANCY: Primary | ICD-10-CM

## 2023-02-23 DIAGNOSIS — O24.410 DIET CONTROLLED GESTATIONAL DIABETES MELLITUS (GDM) IN THIRD TRIMESTER: ICD-10-CM

## 2023-02-23 PROCEDURE — 99213 OFFICE O/P EST LOW 20 MIN: CPT | Performed by: NURSE PRACTITIONER

## 2023-02-23 RX ORDER — PROCHLORPERAZINE 25 MG/1
SUPPOSITORY RECTAL SEE ADMIN INSTRUCTIONS
COMMUNITY
Start: 2023-01-26

## 2023-02-23 RX ORDER — PROCHLORPERAZINE 25 MG/1
SUPPOSITORY RECTAL SEE ADMIN INSTRUCTIONS
COMMUNITY
Start: 2023-02-20

## 2023-02-23 NOTE — PROGRESS NOTES
CC: Prenatal visit    Sofiya Almaguer is a 32 y.o.  at 32w0d.  Doing well.  Denies N/V, dysuria, abnormal vaginal d/c, headaches, heartburn, constipation, cramping, regular contractions, LOF, or VB.  Reports good FM. Pt reports her blood sugars are doing well, has her Dexcom log report but does not have her fingerstick glucose log today.     /68   Wt 73 kg (161 lb)   LMP 2022 (Approximate)   BMI 30.42 kg/m²   SVE: Deferred     Fetal Heart Rate: 145 us     US for growth: Cephalic. HYACINTH 13.89 cm. BPP . EFW 1908 gm (43%), AC 54%ile.     10/22 Problems (from 10/24/22 to present)     Problem Noted Resolved    Diet controlled gestational diabetes mellitus (GDM) in third trimester 2023 by Aliya Simmons APRN No    Overview Addendum 2023  2:04 PM by Aliya Simmons APRN     GS at 32 weeks and  testing at 36 weeks if diet controlled.   If medication needed, start at 32 weeks.          High-risk pregnancy in third trimester 2022 by Анна Andre DO No    Overview Addendum 2023  2:08 PM by Aliya Simmons APRN     Female fetus-no name yet  BC?  Breastfeeding/ pump rx on 2023  Needs postpartup PAP         Obesity in pregnancy, antepartum 2022 by Aliya Simmons APRN No          A/P: Sofiya Almaguer is a 32 y.o.  at 32w0d.  - Reassuring growth scan today. Begin BPP starting a 36 weeks if pt remains diet controlled GDM.   - Counseled to bring fingerstick blood sugar log at next appt as it is easier to review fasting and postprandial sugars this way. Dexcom report shows average glucose is 95 with pt 98% of the time in rage (setting is at ). Pt will email her blood sugars via [employer].   - RTC in 2 weeks     Diagnosis Plan   1. 32 weeks gestation of pregnancy        2. High-risk pregnancy in third trimester        3. Obesity in pregnancy, antepartum        4. Diet controlled gestational diabetes mellitus (GDM) in third  bryan Ornelas, APRN  2/23/2023  12:59 CST

## 2023-02-24 ENCOUNTER — OFFICE VISIT (OUTPATIENT)
Dept: FAMILY MEDICINE CLINIC | Facility: CLINIC | Age: 33
End: 2023-02-24
Payer: COMMERCIAL

## 2023-02-24 VITALS
SYSTOLIC BLOOD PRESSURE: 100 MMHG | BODY MASS INDEX: 30.66 KG/M2 | WEIGHT: 162.4 LBS | HEART RATE: 93 BPM | OXYGEN SATURATION: 98 % | TEMPERATURE: 97.2 F | HEIGHT: 61 IN | DIASTOLIC BLOOD PRESSURE: 60 MMHG | RESPIRATION RATE: 20 BRPM

## 2023-02-24 DIAGNOSIS — Z13.1 SCREENING FOR DIABETES MELLITUS: ICD-10-CM

## 2023-02-24 DIAGNOSIS — Z34.93 THIRD TRIMESTER PREGNANCY: ICD-10-CM

## 2023-02-24 DIAGNOSIS — Z00.00 WELL ADULT EXAM: Primary | ICD-10-CM

## 2023-02-24 DIAGNOSIS — M54.50 LOW BACK PAIN DURING PREGNANCY IN THIRD TRIMESTER: ICD-10-CM

## 2023-02-24 DIAGNOSIS — Z13.220 SCREENING FOR LIPOID DISORDERS: ICD-10-CM

## 2023-02-24 DIAGNOSIS — Z13.29 SCREENING FOR THYROID DISORDER: ICD-10-CM

## 2023-02-24 DIAGNOSIS — O26.893 LOW BACK PAIN DURING PREGNANCY IN THIRD TRIMESTER: ICD-10-CM

## 2023-02-24 PROCEDURE — 2014F MENTAL STATUS ASSESS: CPT | Performed by: NURSE PRACTITIONER

## 2023-02-24 PROCEDURE — 1160F RVW MEDS BY RX/DR IN RCRD: CPT | Performed by: NURSE PRACTITIONER

## 2023-02-24 PROCEDURE — 3008F BODY MASS INDEX DOCD: CPT | Performed by: NURSE PRACTITIONER

## 2023-02-24 PROCEDURE — 99395 PREV VISIT EST AGE 18-39: CPT | Performed by: NURSE PRACTITIONER

## 2023-02-24 PROCEDURE — 1159F MED LIST DOCD IN RCRD: CPT | Performed by: NURSE PRACTITIONER

## 2023-02-24 RX ORDER — BLOOD-GLUCOSE METER
KIT MISCELLANEOUS
COMMUNITY
Start: 2023-01-26

## 2023-03-01 DIAGNOSIS — O24.410 DIET CONTROLLED GESTATIONAL DIABETES MELLITUS (GDM) IN THIRD TRIMESTER: ICD-10-CM

## 2023-03-01 DIAGNOSIS — O09.93 HIGH-RISK PREGNANCY IN THIRD TRIMESTER: ICD-10-CM

## 2023-03-01 DIAGNOSIS — O99.210 OBESITY IN PREGNANCY, ANTEPARTUM: ICD-10-CM

## 2023-03-06 ENCOUNTER — HOSPITAL ENCOUNTER (OUTPATIENT)
Dept: PHYSICAL THERAPY | Facility: HOSPITAL | Age: 33
Setting detail: THERAPIES SERIES
Discharge: HOME | End: 2023-03-06
Payer: COMMERCIAL

## 2023-03-06 DIAGNOSIS — M54.50 LOW BACK PAIN DURING PREGNANCY IN THIRD TRIMESTER: ICD-10-CM

## 2023-03-06 DIAGNOSIS — O26.893 LOW BACK PAIN DURING PREGNANCY IN THIRD TRIMESTER: ICD-10-CM

## 2023-03-06 DIAGNOSIS — Z34.93 THIRD TRIMESTER PREGNANCY: Primary | ICD-10-CM

## 2023-03-06 PROCEDURE — 97162 PT EVAL MOD COMPLEX 30 MIN: CPT

## 2023-03-06 NOTE — THERAPY EVALUATION
Outpatient Physical Therapy Pelvic Health Initial Evaluation  [facility]     Patient Name: Sofiya Almaguer  : 1990  MRN: [id number]  Today's Date: 3/6/2023    Patient seen for 1 PT sessions.  Patient reports N/A% of improvement.  Next MD appt: 2023  Recertification: 3/27/2023      Therapy Diagnosis: LBP in 3rd trimester of pregnancy with myofascial restrictions       Visit Date: 2023    Patient Active Problem List   Diagnosis   • Acne   • Vitamin D deficiency   • Chronic bilateral low back pain without sciatica   • Hives   • Dermatitis   • Left otitis media   • Obesity in pregnancy, antepartum   • High-risk pregnancy in third trimester   • Diet controlled gestational diabetes mellitus (GDM) in third trimester        Past Medical History:   Diagnosis Date   • Abnormal liver function    • Abnormal Pap smear of cervix    • Abnormal weight gain    • Acne    • Cervical intraepithelial neoplasia grade 2     h/o   • Contraception    • Ear infection     bilateral   • Headache    • Hyperlipidemia    • Vitamin D deficiency         Past Surgical History:   Procedure Laterality Date   • ADENOIDECTOMY     • CERVICAL BIOPSY  W/ LOOP ELECTRODE EXCISION     • INCISION AND DRAINAGE ABSCESS  2012    simple I&D   • LEEP  2007    LEEP cone biopsy. moderate cervical dysplasia   • OTHER SURGICAL HISTORY  2011    remove impacted cerumen   • TONSILLECTOMY     • WISDOM TOOTH EXTRACTION      only had 1 removed         Visit Dx:    ICD-10-CM ICD-9-CM   1. Third trimester pregnancy  Z34.93 V22.2   2. 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 Patient reports her pain is sharp, lightning pain. Patient reports her pain is mostly on her L side around her buttcheek. Denies radicular symptoms. Standing up after sitting prolonged periods, lifting her L leg, lifting, bending, turning too quickly bother her pain. Patient reports heat and hot shower seem to help. Reports she feels pretty stiff in the AM. Denies back pain before children. Reports she just started seeing chiropractor last friday for her pain as well. Reports she sleeps on her left side mostly with a pillow in between her legs. 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 Reports she will  a flamingo position at work a lot.  -AC         Pregnancy Questions    Number of Pregnancies 6  -AC      Number of Miscarriages 0  -AC      Has the patient had an ? No  -AC      Number of Children 5  -AC      Type of Previous Deliveries Vaginal;Other (comment)  2 episiotomies  -AC      Due Date 23  -AC      Do you have radicular pain or numbness? No  -AC      Are you currently exercising? No  walks a lot at work  -AC         Pain Assessment    Pain Assessment No/denies pain  -AC      Pain Score 0  -AC      Post Pain Score 0  -AC      Pain Type Chronic pain  -AC      Pain Location Back  -AC      Pain Descriptors Sharp;Stabbing;Discomfort  -AC      Pain Frequency Constant/continuous  -AC         Lumbar/SI Special Tests    Standing Flexion Test (SI Dysfunction) Bilateral:;Positive  -AC      Trendelenburg Test (Gluteus Medius Weakness)  Left:;Positive  -AC      Slump Test (Neural Tension) Bilateral:;Negative  -AC      SI Compression Test (SI Dysfunction) Bilateral:;Positive  -AC      SI Distraction Test (SI Dysfunction) Bilateral:;Positive  -AC      FELICIA (hip vs. SI Dysfunction) Left:;Positive  -AC      Gaenslen's Test (SI Dysfunction) Bilateral:;Negative  -AC      FAIR Test (Piriformis Syndrome) Left:;Positive  -AC      [place] Test (SI Dysfunction) Bilateral:;Negative  -AC      Lumbar/SI Special Tests Comments Pelvis level, no LLD noted.  -AC         Lumbosacral Palpation    Lumbosacral Palpation? Yes  -AC      Piriformis Left:;Tender;Guarded/taut  -AC      Gluteus Melo Left:;Tender  -AC      Lumbosacral Palpation Comments L glute med tender/guarded and taut.  -AC         Lumbosacral Accessory Motions    Lumbosacral Accessory Motions Tested? Yes  -AC      PA [place]- L5 WNL  -AC      PA glide- Sacral base WNL  -AC      Innominate rotation WNL  -AC      Innominate inflare WNL  -AC      Innominate outflare WNL  -AC         Pelvic Floor Muscle    Internal Pelvic Floor Comments deferred  -AC      External Pelvic Floor Comments deferred  -AC         Observations    Perineal Observation Performed? Yes  -AC      Posture Observations Patient does not appear to be in acute distress. Patient posture WFL. Fair postural awareness noted. Pelvis level with no LLD noted.  -AC         SEMG Evaluation    SEMG Evaluation Comments deferred  -AC         General ROM    GENERAL ROM COMMENTS B UE and LE AROM WFL.  -AC         MMT (Manual Muscle Testing)    General MMT Comments B LE 5/5 except B hip abd 3+/5, and B hip IR 4/5. Functional UE/LE strength for transfers.  -            User Key  (r) = Recorded By, (t) = Taken By, (c) = Cosigned By    Initials Name Provider Type    Zelda De Santiago PT Physical Therapist               PT Ortho     Row Name 03/06/23 0800       Precautions and Contraindications    Precautions/Limitations other (see comments)  -     Precautions 3rd trimester of gestation  -AC    Contraindications no supine position for extended amount of time  -AC       Subjective Pain    Able to rate subjective pain? yes  -AC    Pre-Treatment Pain Level 0  -AC    Post-Treatment Pain Level 0  -AC       Posture/Observations    Posture- WNL Posture is WNL  -AC       Sensation    Sensation WNL? WNL  -AC    Light Touch No apparent deficits  -AC       Lower Extremity Flexibility    Overall LE Flexibility Bilateral:;WNL  -AC    Hamstrings Left:;Mildly limited  -AC    Hip Internal Rotators Left:;Mildly limited  -AC       Pathomechanics    Spine Pathomechanics Bends knees with attempted lumbar extension;Hinges into extension at one segment in lumbar  -AC       Transfers    Comment, (Transfers) I with all transfers. Poor log roll technique noted.  -AC       Gait/Stairs (Locomotion)    Comment, (Gait/Stairs) I with ambulation into clinic with no AD. No significant gait deviations noted.  -AC          User Key  (r) = Recorded By, (t) = Taken By, (c) = Cosigned By    Initials Name Provider Type     Zelda Oneal, PT Physical Therapist                            PT Assessment/Plan     Row Name 23 0800          PT Assessment    Functional Limitations Limitation in home management;Performance in work activities;Performance in leisure activities;Limitations in community activities;Limitations in functional capacity and performance  -     Impairments Posture;Pain;Muscle strength;Impaired muscle endurance;Impaired flexibility;Coordination  -     Assessment Comments Patient is a 33 y.o. female who presents to PT with c/o of L sided LE/lumbar region pain present in third trimester of pregnancy. at 33w6d. Patient presents with poor deep core stability, decreased strength and flexibility in B LEs, and abnormal breathing techniques. Myofascial restrictions also noted along L glute med and L piriformis. Patient would benefit from skilled PT to address  dysfunction and promote good trunk stability and relaxation preparing for delivery. Patient insurance does not allow tx to begin on initial eval. A small HEP was established.  -AC     Please refer to paper survey for additional self-reported information No  -AC     Rehab Potential Good  -AC     Patient/caregiver participated in establishment of treatment plan and goals Yes  -AC     Patient would benefit from skilled therapy intervention Yes  -AC        PT Plan    PT Frequency 2x/week  -AC     Planned CPT's? PT EVAL MOD COMPLELITY: 35969;PT RE-EVAL: 53510;PT THER PROC EA 15 MIN: 26885;PT THER ACT EA 15 MIN: 04342;PT MANUAL THERAPY EA 15 MIN: 48786;PT NEUROMUSC RE-EDUCATION EA 15 MIN: 78260;PT SELF CARE/HOME MGMT/TRAIN EA 15: 79773;PT HOT OR COLD PACK TREAT [facility];PT THER SUPP EA 15 MIN;PT SELF CARE/MGMT/TRAIN 15 MIN: 91044  -AC     PT Plan Comments Progress overall core stability, flexibility, and B LE strength. Next visit begin manual therapy to L glute med and L piriformis. Continue core stability training.  -AC           User Key  (r) = Recorded By, (t) = Taken By, (c) = Cosigned By    Initials Name Provider Type     Zelda Oneal, PT Physical Therapist                   OP Exercises     Row Name 03/06/23 0800             Subjective Comments    Subjective Comments Patient reports her pain in her back started with her 5th child in 2017. Patient reports her pain is sharp, lightning pain. Patient reports her pain is mostly on her L side around her buttcheek. Denies radicular symptoms. Standing up after sitting prolonged periods, lifting her L leg, lifting, bending, turning too quickly bother her pain. Patient reports heat and hot shower seem to help. Reports she feels pretty stiff in the AM. Denies back pain before children. Reports she just started seeing chiropractor last friday for her pain as well. Reports she sleeps on her left side mostly with a pillow in between her legs. 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 Reports she will  a flamingo position at work a lot.  -AC         Subjective Pain    Able to rate subjective pain? yes  -AC      Pre-Treatment Pain Level 0  -AC      Post-Treatment Pain Level 0  -AC         Exercise 1    Exercise Name 1 Diaphragmatic breathing  -AC      Time 1 5 mins  -AC      Additional Comments sidelying  -AC         Exercise 2    Exercise Name 2 TA contractions  -AC      Sets 2 1  -AC      Reps 2 5  -AC      Time 2 3 sec hold  -AC      Additional Comments sidelying  -AC         Exercise 3    Exercise Name 3 B seated HS stretch  -AC      Reps 3 2  -AC      Time 3 30 sec hold  -AC         Exercise 4    Exercise Name 4 B seated ADD stretch  -AC      Reps 4 2  -AC      Time 4 30 sec hold  -AC         Exercise 5    Exercise Name 5 Patient ed on proper posture, towel roll technique, and HEP/POC  -AC      Time 5 20  -AC            User Key  (r) = Recorded By, (t) = Taken By, (c) = Cosigned By    Initials Name Provider Type    [facility] Physical Therapist                             PT OP Goals     Row Name 03/06/23 0900          PT Short Term Goals    STG Date to Achieve 03/27/23  -     STG 1 Patient to be I with HEP with additions/changes prior to next recertification.  -     STG 2 Patient to demo proper diaphragmatic breathing x 3 minutes with good form and no cues from PT.  -     STG 3 Patient to perform 20 repetitions of TrA contractions in various positions with good form and no cues from PT.  -     STG 4 Patient to demo proper activation of B multifidi in various functional positions with no cues from PT.  -        Long Term Goals    LTG Date to Achieve 04/20/23  -     LTG 1 Patient to perform 10 sit to/from stands with proper TA contraction with no difficulty or pain noted.  -AC     LTG 2 No TTP of L glute medius.  -     LTG 3 No TTP of L pifiromis.  -     LTG 4 Patient to peform 10 B SLR with proper deep core activation and breath support without  cues from PT.  -AC     LTG 5 B LE strength 5/5.  -AC     LTG 6 Patient to be I with symptom/pain management in order to prevent reoccurrence of symptoms and resume normal caregiver tasks.  -AC        Time Calculation    PT Goal Re-Cert Due Date 03/27/23  -           User Key  (r) = Recorded By, (t) = Taken By, (c) = Cosigned By    Initials Name Provider Type    AC Zelda Oneal, SONAM Physical Therapist                Therapy Education  Education Details: HEP, anatomy and physiology, postural changes, POC.  Given: HEP, Symptoms/condition management, Pain management, Posture/body mechanics  Program: New  How Provided: Verbal, Demonstration, Written  Provided to: Patient  Level of Understanding: Verbalized, Demonstrated               Time Calculation:   Start Time: 0804  Stop Time: 0908  Time Calculation (min): 64 min  PT Non-Billable Time (min): 6 min  Total Timed Code Minutes- PT: 58 minute(s)  Therapy Charges for Today     Code Description Service Date Service Provider Modifiers Qty    00744167529  PT THER SUPP EA 15 MIN 3/6/2023 Zelda Oneal PT GP 1    [id number]  PT EVAL MOD COMPLEXITY 4 3/6/2023 Zelda Oneal, PT GP 1                  Zelda Oneal PT , DPT 3/6/2023

## 2023-03-09 ENCOUNTER — ROUTINE PRENATAL (OUTPATIENT)
Dept: OBSTETRICS AND GYNECOLOGY | Facility: CLINIC | Age: 33
End: 2023-03-09
Payer: COMMERCIAL

## 2023-03-09 VITALS — SYSTOLIC BLOOD PRESSURE: 110 MMHG | DIASTOLIC BLOOD PRESSURE: 68 MMHG | BODY MASS INDEX: 30.61 KG/M2 | WEIGHT: 162 LBS

## 2023-03-09 DIAGNOSIS — O99.210 OBESITY IN PREGNANCY, ANTEPARTUM: ICD-10-CM

## 2023-03-09 DIAGNOSIS — O26.893 PELVIC PAIN IN PREGNANCY, ANTEPARTUM, THIRD TRIMESTER: ICD-10-CM

## 2023-03-09 DIAGNOSIS — O09.93 HIGH-RISK PREGNANCY IN THIRD TRIMESTER: ICD-10-CM

## 2023-03-09 DIAGNOSIS — O24.410 DIET CONTROLLED GESTATIONAL DIABETES MELLITUS (GDM) IN THIRD TRIMESTER: ICD-10-CM

## 2023-03-09 DIAGNOSIS — R10.2 PELVIC PAIN IN PREGNANCY, ANTEPARTUM, THIRD TRIMESTER: ICD-10-CM

## 2023-03-09 DIAGNOSIS — Z3A.34 34 WEEKS GESTATION OF PREGNANCY: Primary | ICD-10-CM

## 2023-03-09 PROCEDURE — 99214 OFFICE O/P EST MOD 30 MIN: CPT | Performed by: NURSE PRACTITIONER

## 2023-03-09 NOTE — PROGRESS NOTES
CC: Prenatal visit    Sofiya Almaguer is a 33 y.o.  at 34w0d.  Doing well.  Denies N/V, dysuria, abnormal vaginal d/c, headaches, heartburn, constipation, cramping, regular contractions, LOF, or VB.  Reports good FM. Fasting sugars and postprandial sugars all WNL. However, sometimes skips a meal as she has chiropractor or physical therapy appts so then she will just drink a protein shake. Recommend to try to take a check 1 hr postprandial after these replacement meals.    /68   Wt 73.5 kg (162 lb)   LMP 2022 (Approximate)   BMI 30.61 kg/m²   SVE: Deferred  Fundal Height (cm): 33 cm  Fetal Heart Rate: 150    Glucose lo/23-3/8    Fastings: 71-87 (14 readings)  Breakfast PP:  (8 readings)  Lunch:  (13 readings)  Dinner:  (12 readings)      10/22 Problems (from 10/24/22 to present)     Problem Noted Resolved    Pelvic pain in pregnancy, antepartum, third trimester 3/9/2023 by Aliya Simmons APRN No    Overview Signed 3/9/2023 11:46 AM by Aliya Simmons APRN     Sees physical therapy         Diet controlled gestational diabetes mellitus (GDM) in third trimester 2023 by Aliya Simmons APRN No    Overview Addendum 3/9/2023 11:45 AM by Aliya Simmons APRN     GS at 32 weeks and  testing at 36 weeks if diet controlled.   If medication needed, start at 32 weeks.     IOL at 39 weeks if continues to be GDM diet controlled.          High-risk pregnancy in third trimester 2022 by Анна Andre,  No    Overview Addendum 2023  2:08 PM by Aliya Simmons APRN     Female fetus-no name yet  BC?  Breastfeeding/ pump rx on 2023  Needs postpartup PAP         Obesity in pregnancy, antepartum 2022 by Aliya Simmons APRN No    Chronic bilateral low back pain without sciatica 2018 by Eva Jon DNP, APRN No    Overview Signed 3/9/2023 11:46 AM by Aliya Simmons APRN     Sees chiropractor and physical therayp                A/P: Sofiya Almaguer is a 33 y.o.  at 34w0d.  - GS and BPP in 2 weeks and begin weekly BPP at 36 weeks.  - GBS at next appt  - Discussed IOL at 39 weeks if pt remains diet-controlled GDM.  - PTL and fetal movement precautions  - RTC in 2 weeks     Diagnosis Plan   1. 34 weeks gestation of pregnancy        2. High-risk pregnancy in third trimester  US Ob Follow Up Transabdominal Approach    US Fetal Biophysical Profile;Without Non-Stress Testing    US Fetal Biophysical Profile;Without Non-Stress Testing      3. Obesity in pregnancy, antepartum  US Ob Follow Up Transabdominal Approach    US Fetal Biophysical Profile;Without Non-Stress Testing    US Fetal Biophysical Profile;Without Non-Stress Testing      4. Diet controlled gestational diabetes mellitus (GDM) in third trimester  US Ob Follow Up Transabdominal Approach    US Fetal Biophysical Profile;Without Non-Stress Testing    US Fetal Biophysical Profile;Without Non-Stress Testing      5. Pelvic pain in pregnancy, antepartum, third trimester          Aliya Ornelas, APRN  3/9/2023  11:57 CST

## 2023-03-11 NOTE — PROGRESS NOTES
Subjective   Sofiya Almaguer is a 33 y.o. female.     History of Present Illness  She presents today for her annual wellness visit.  She is currently in her third trimester pregnancy.  She has been seeing OB/GYN regularly.  She has been diagnosed with gestational diabetes, however, she is controlling this with diet.  She does have a Dexcom.  She does have complaints of worsening lower back pain.  She would like a referral to physical therapy for evaluation and treatment.  She is due for routine fasting labs.  She will have these completed after the delivery of her baby.  Her BMI is currently 30.7%.  She is otherwise without any other new complaints today in the office.       The following portions of the patient's history were reviewed and updated as appropriate: allergies, current medications, past family history, past medical history, past social history, past surgical history and problem list.    Review of Systems   Constitutional: Negative.    HENT: Negative.    Eyes: Negative.    Respiratory: Negative.    Cardiovascular: Negative.    Gastrointestinal: Negative.    Musculoskeletal: Positive for arthralgias, back pain and myalgias.   Skin: Negative.    Allergic/Immunologic: Negative.    Neurological: Negative.    Hematological: Negative.    Psychiatric/Behavioral: Negative.        Objective   Physical Exam  Vitals reviewed.   Constitutional:       General: She is not in acute distress.     Appearance: She is well-developed. She is obese. She is not diaphoretic.   HENT:      Head: Normocephalic.      Right Ear: External ear normal.      Left Ear: External ear normal.      Nose: Nose normal.   Eyes:      Pupils: Pupils are equal, round, and reactive to light.   Neck:      Thyroid: No thyromegaly.      Vascular: No JVD.   Cardiovascular:      Rate and Rhythm: Normal rate and regular rhythm.      Heart sounds: No murmur heard.    No friction rub. No gallop.   Pulmonary:      Effort: Pulmonary effort is normal.  No respiratory distress.      Breath sounds: Normal breath sounds. No wheezing or rales.   Musculoskeletal:         General: Normal range of motion.      Cervical back: Normal range of motion and neck supple.   Skin:     General: Skin is warm and dry.      Coloration: Skin is not pale.      Findings: No erythema or rash.   Neurological:      Mental Status: She is alert and oriented to person, place, and time.   Psychiatric:         Behavior: Behavior normal.         Thought Content: Thought content normal.         Judgment: Judgment normal.           Assessment & Plan   Diagnoses and all orders for this visit:    1. Well adult exam (Primary)  -     CBC & Differential  -     Comprehensive Metabolic Panel  -     Hemoglobin A1c  -     Lipid Panel  -     TSH  -     Vitamin D,25-Hydroxy    2. Third trimester pregnancy  -     CBC & Differential  -     Comprehensive Metabolic Panel  -     Hemoglobin A1c  -     Lipid Panel  -     TSH  -     Vitamin D,25-Hydroxy  -     Ambulatory Referral to Physical Therapy Pelvic Floor, Evaluate and treat    3. Screening for diabetes mellitus  -     CBC & Differential  -     Comprehensive Metabolic Panel  -     Hemoglobin A1c  -     Lipid Panel  -     TSH  -     Vitamin D,25-Hydroxy    4. Screening for lipoid disorders  -     CBC & Differential  -     Comprehensive Metabolic Panel  -     Hemoglobin A1c  -     Lipid Panel  -     TSH  -     Vitamin D,25-Hydroxy    5. Screening for thyroid disorder  -     CBC & Differential  -     Comprehensive Metabolic Panel  -     Hemoglobin A1c  -     Lipid Panel  -     TSH  -     Vitamin D,25-Hydroxy    6. Low back pain during pregnancy in third trimester  -     Ambulatory Referral to Physical Therapy Pelvic Floor, Evaluate and treat               BMI is >= 30 and <35. (Class 1 Obesity). The following options were offered after discussion;: weight loss educational material (shared in after visit summary)    Anticipatory guidance provided at  discharge.  Referral to physical therapy for evaluation.  Continue to follow-up with OB/GYN as scheduled for pregnancy monitoring.  Continue current medications.  Follow up as scheduled for routine follow up.  Follow up sooner for problems/concerns.  Patient verbalized understanding and agreement with plan of care.        This document has been electronically signed by Eva Jon DNP, APRN on March 10, 2023 21:41 CST

## 2023-03-14 ENCOUNTER — HOSPITAL ENCOUNTER (OUTPATIENT)
Dept: PHYSICAL THERAPY | Facility: HOSPITAL | Age: 33
Setting detail: THERAPIES SERIES
Discharge: HOME | End: 2023-03-14
Payer: COMMERCIAL

## 2023-03-14 DIAGNOSIS — M54.50 LOW BACK PAIN DURING PREGNANCY IN THIRD TRIMESTER: ICD-10-CM

## 2023-03-14 DIAGNOSIS — Z34.93 THIRD TRIMESTER PREGNANCY: Primary | ICD-10-CM

## 2023-03-14 DIAGNOSIS — O26.893 LOW BACK PAIN DURING PREGNANCY IN THIRD TRIMESTER: ICD-10-CM

## 2023-03-14 PROCEDURE — 97110 THERAPEUTIC EXERCISES: CPT

## 2023-03-14 PROCEDURE — 97112 NEUROMUSCULAR REEDUCATION: CPT

## 2023-03-14 PROCEDURE — 97140 MANUAL THERAPY 1/> REGIONS: CPT

## 2023-03-14 NOTE — THERAPY TREATMENT NOTE
Outpatient Physical Therapy Pelvic Health Treatment Note  [facility]     Patient Name: Sofiya Almaguer  : 1990  MRN: [id number]  Today's Date: 3/14/2023    Patient seen for 2 PT sessions.  Patient reports N/A% of improvement.  Next MD appt: 2023  Recertification: 3/27/2023      Therapy Diagnosis: LBP in 3rd trimester of pregnancy with myofascial restrictions     Visit Date: 2023    Visit Dx:    ICD-10-CM ICD-9-CM   1. Third trimester pregnancy  Z34.93 V22.2   2. Low back pain during pregnancy in third trimester  O26.893 646.80    M54.50 724.2       Patient Active Problem List   Diagnosis   • Acne   • Vitamin D deficiency   • Chronic bilateral low back pain without sciatica   • Hives   • Dermatitis   • Left otitis media   • Obesity in pregnancy, antepartum   • High-risk pregnancy in third trimester   • Diet controlled gestational diabetes mellitus (GDM) in third trimester   • Pelvic pain in pregnancy, antepartum, third trimester         Pelvic Health     Row Name 23 1000             Pregnancy Questions    Number of Pregnancies 6  -AC      Number of Miscarriages 0  -AC      Has the patient had an ? No  -AC      Number of Children 5  -AC      Type of Previous Deliveries Vaginal;Other (comment)  -AC      Due Date 23  -AC      Do you have radicular pain or numbness? No  -AC      Are you currently exercising? No  -AC      Pregnancy Comments 2 episiotomies  -AC            User Key  (r) = Recorded By, (t) = Taken By, (c) = Cosigned By    Initials Name Provider Type    Zelda De Santiago PT Physical Therapist               PT Ortho     Row Name 23 1000       Subjective Comments    Subjective Comments Patient reports her week was a little stressful. Patient reports her L leg seemed to lock up on her and she had a lot of pressure in her back. Patient reports nothing out of the ordinary that would have caused the pain. Patient reports sitting on the roll towels  "seemed to help while putting the baby dresser together. Reports the breathing is still kind of hard. Patient reports she is able to lift her L leg higher than before.  -AC       Precautions and Contraindications    Precautions/Limitations other (see comments)  -AC    Precautions 3rd trimester of gestation  -AC    Contraindications no supine position for extended amount of time  -AC       Subjective Pain    Pre-Treatment Pain Level 0  \"just a little soreness\"  -AC    Post-Treatment Pain Level 0  \"feels okay\"  -AC          User Key  (r) = Recorded By, (t) = Taken By, (c) = Cosigned By    Initials Name Provider Type    AC Zelda Oneal, PT Physical Therapist                            PT Assessment/Plan     Row Name 03/14/23 1000          PT Assessment    Assessment Comments Patient extremely challenged with diaphragmatic breathing this visit. Patient attempted to perform in seated on pball, sidelying, and semireclined positions. Patient still presents with increased chest excursion and limited movement of diaphragnm. Patient also reports difficulty getting in a good breath. will need continued reinforcement in future. Patient tolerated manual therapy to L piriformis and L QL well with reduction of tightness noted at end of tx session. Patient has a difficult time finding positions that are comfortable for her at this time due to late gestation period. Continue to progress as tolerated.  -AC     Rehab Potential Good  -AC     Patient/caregiver participated in establishment of treatment plan and goals Yes  -AC     Patient would benefit from skilled therapy intervention Yes  -AC        PT Plan    PT Frequency 2x/week  -AC     PT Plan Comments Next visit perform relaxation techniques over pball. Continue core stability training. Continue to work on diaphragmatic breathing. Manual therapy prn. QP positions for relaxation.  -AC           User Key  (r) = Recorded By, (t) = Taken By, (c) = Cosigned By    Initials Name " "Provider Type    AC Zelda Oneal, PT Physical Therapist                   OP Exercises     Row Name 03/14/23 1000             Subjective Comments    Subjective Comments Patient reports her week was a little stressful. Patient reports her L leg seemed to lock up on her and she had a lot of pressure in her back. Patient reports nothing out of the ordinary that would have caused the pain. Patient reports sitting on the roll towels seemed to help while putting the baby dresser together. Reports the breathing is still kind of hard. Patient reports she is able to lift her L leg higher than before.  -AC         Subjective Pain    Able to rate subjective pain? yes  -AC      Pre-Treatment Pain Level 0  \"just a little soreness\"  -AC      Post-Treatment Pain Level 0  \"feels okay\"  -AC         Exercise 1    Exercise Name 1 Diaphragmatic breathing  -AC      Time 1 6 mins  -AC      Additional Comments attempted in semi reclined and seated positions.  -AC         Exercise 2    Exercise Name 2 TA contractions  -AC      Sets 2 2  -AC      Reps 2 5  -AC      Time 2 3 sec hold  -AC      Additional Comments sidelying  -AC         Exercise 3    Exercise Name 3 B seated HS stretch  -AC      Reps 3 2  -AC      Time 3 30 sec hold  -AC         Exercise 4    Exercise Name 4 B seated ADD stretch  -AC      Reps 4 3  -AC      Time 4 30 sec hold  -AC         Exercise 5    Exercise Name 5 B piriformis stretch  -AC      Reps 5 3  -AC      Time 5 30 sec hold  -AC         Exercise 6    Exercise Name 6 B HL semi elevated iso hip add  -AC      Sets 6 1  -AC      Reps 6 10  -AC      Time 6 3\"hold  -AC      Additional Comments in coordination with breath and TA contraction  -AC         Exercise 7    Exercise Name 7 Manual therapy  -AC      Additional Comments see flowsheet  -AC         Exercise 8    Exercise Name 8 B standing hip flexor S  -AC      Reps 8 2  -AC      Time 8 30 sec hold  -AC         Exercise 9    Exercise Name 9 Child pose S over " with elevation of pillows.  -      Reps 9 1  -      Time 9 1 min hold  -            User Key  (r) = Recorded By, (t) = Taken By, (c) = Cosigned By    Initials Name Provider Type     Zelda Oneal, PT Physical Therapist              Manual Rx (last 36 hours)     Manual Treatments     Row Name 03/14/23 1100             Manual Rx 1    Manual Rx 1 Location L piriformis  -AC      Manual Rx 1 Type STM/MFR with deactivation  -      Manual Rx 1 Duration 4 mins  -         Manual Rx 2    Manual Rx 2 Location L QL  -AC      Manual Rx 2 Type STM/MFR with deactivation and reinforcement  -      Manual Rx 2 Duration 8 mins  -            User Key  (r) = Recorded By, (t) = Taken By, (c) = Cosigned By    Initials Name Provider Type     Zelda Oneal, PT Physical Therapist                           PT OP Goals     Row Name 03/14/23 1000          PT Short Term Goals    STG Date to Achieve 03/27/23  -     STG 1 Patient to be I with HEP with additions/changes prior to next recertification.  -     STG 2 Patient to demo proper diaphragmatic breathing x 3 minutes with good form and no cues from PT.  -     STG 2 Progress Ongoing;Progressing  -     STG 3 Patient to perform 20 repetitions of TrA contractions in various positions with good form and no cues from PT.  -     STG 4 Patient to demo proper activation of B multifidi in various functional positions with no cues from PT.  -        Long Term Goals    LTG Date to Achieve 04/20/23  -     LTG 1 Patient to perform 10 sit to/from stands with proper TA contraction with no difficulty or pain noted.  -     LTG 2 No TTP of L glute medius.  -     LTG 3 No TTP of L pifiromis.  -     LTG 4 Patient to peform 10 B SLR with proper deep core activation and breath support without cues from PT.  -     LTG 5 B LE strength 5/5.  -     LTG 6 Patient to be I with symptom/pain management in order to prevent reoccurrence of symptoms and resume normal caregiver  tasks.  -AC        Time Calculation    PT Goal Re-Cert Due Date 03/27/23  -           User Key  (r) = Recorded By, (t) = Taken By, (c) = Cosigned By    Initials Name Provider Type    AC Zelda Oneal, PT Physical Therapist                                Time Calculation:   Start Time: 1008  Stop Time: 1104  Time Calculation (min): 56 min  Total Timed Code Minutes- PT: 56 minute(s)  Therapy Charges for Today     Code Description Service Date Service Provider Modifiers Qty    95768254195 HC PT THER SUPP EA 15 MIN 3/14/2023 Zelda Oneal, PT GP 1    14913155672 HC PT MANUAL THERAPY EA 15 MIN 3/14/2023 OnealZelda, PT GP 1    84037096015 HC PT THER PROC EA 15 MIN 3/14/2023 OnealZelda, PT GP 2    93260164638 HC PT NEUROMUSC RE EDUCATION EA 15 MIN 3/14/2023 OnealZelda, PT GP 1                    Zelda Oneal, SONMA , DPT 3/14/2023

## 2023-03-16 ENCOUNTER — HOSPITAL ENCOUNTER (OUTPATIENT)
Dept: PHYSICAL THERAPY | Facility: HOSPITAL | Age: 33
Setting detail: THERAPIES SERIES
Discharge: HOME | End: 2023-03-16
Payer: COMMERCIAL

## 2023-03-16 DIAGNOSIS — Z34.93 THIRD TRIMESTER PREGNANCY: Primary | ICD-10-CM

## 2023-03-16 DIAGNOSIS — M54.50 LOW BACK PAIN DURING PREGNANCY IN THIRD TRIMESTER: ICD-10-CM

## 2023-03-16 DIAGNOSIS — O26.893 LOW BACK PAIN DURING PREGNANCY IN THIRD TRIMESTER: ICD-10-CM

## 2023-03-16 PROCEDURE — 97110 THERAPEUTIC EXERCISES: CPT

## 2023-03-16 PROCEDURE — 97140 MANUAL THERAPY 1/> REGIONS: CPT

## 2023-03-16 NOTE — THERAPY TREATMENT NOTE
Outpatient Physical Therapy Pelvic Health Treatment Note  [facility]     Patient Name: Sofiya Almaguer  : 1990  MRN: [id number]  Today's Date: 3/16/2023    Patient reports N/A% of improvement.  Next MD appt: 2023  Recertification: 3/27/2023      Therapy Diagnosis: LBP in 3rd trimester of pregnancy with myofascial restrictions     Visit Date: 2023    Visit Dx:    ICD-10-CM ICD-9-CM   1. Third trimester pregnancy  Z34.93 V22.2   2. Low back pain during pregnancy in third trimester  O26.893 646.80    M54.50 724.2       Patient Active Problem List   Diagnosis   • Acne   • Vitamin D deficiency   • Chronic bilateral low back pain without sciatica   • Hives   • Dermatitis   • Left otitis media   • Obesity in pregnancy, antepartum   • High-risk pregnancy in third trimester   • Diet controlled gestational diabetes mellitus (GDM) in third trimester   • Pelvic pain in pregnancy, antepartum, third trimester         Pelvic Health     Row Name 23 1100             Subjective Comments    Subjective Comments Patient reports she is increased pain today in her low back (L side); Patient reports a pull behind her L knee. Reports she has been working non stop and has had no rest. Patient reports standing in one spot makes it worse. Patient reports the stretches seems to help a little. Patient reports reclined back seems to help with her breathing better.  -AC         Pregnancy Questions    Number of Pregnancies 6  -AC      Number of Miscarriages 0  -AC      Has the patient had an ? No  -AC      Number of Children 5  -AC      Type of Previous Deliveries Vaginal;Other (comment)  -AC      Due Date 23  -AC      Do you have radicular pain or numbness? No  -AC      Are you currently exercising? No  -AC      Pregnancy Comments 2 episiotomies  -AC         Pain Assessment    Pain Assessment 0-10  -AC      Pain Score 5  -AC            User Key  (r) = Recorded By, (t) = Taken By, (c) = Cosigned  "By    Initials Name Provider Type    Zelda De Santiago PT Physical Therapist               PT Ortho     Row Name 03/16/23 1100       Precautions and Contraindications    Precautions 3rd trimester of gestation  -AC    Contraindications no supine position for extended amount of time  -AC       Subjective Pain    Able to rate subjective pain? yes  -AC    Pre-Treatment Pain Level 5  \"feels like a bruise or a punch\"  -AC          User Key  (r) = Recorded By, (t) = Taken By, (c) = Cosigned By    Initials Name Provider Type    Zelda De Santiago PT Physical Therapist                            PT Assessment/Plan     Row Name 03/16/23 1100          PT Assessment    Assessment Comments Good recall and form with HEP. Attempted pball relaxation activitie however patient round ligament pain began to flare up. Deferred. STM to L erector spinae and L QL perfomed this visit with good patient tolerance and reduction of symptoms noted at end of session. Tissue restrictions noted at L glute med and L piriformis as well this visit.  -AC     Rehab Potential Good  -AC     Patient/caregiver participated in establishment of treatment plan and goals Yes  -AC     Patient would benefit from skilled therapy intervention Yes  -AC        PT Plan    PT Frequency 2x/week  -AC     PT Plan Comments Next visit continue manual therapy; continue core stability training. Revisit breathing activities.  -AC           User Key  (r) = Recorded By, (t) = Taken By, (c) = Cosigned By    Initials Name Provider Type    Zelda De Santiago PT Physical Therapist                   OP Exercises     Row Name 03/16/23 1100             Subjective Comments    Subjective Comments Patient reports she is increased pain today in her low back (L side); Patient reports a pull behind her L knee. Reports she has been working non stop and has had no rest. Patient reports standing in one spot makes it worse. Patient reports the stretches seems to help a little. Patient " "reports reclined back seems to help with her breathing better.  -AC         Subjective Pain    Able to rate subjective pain? yes  -AC      Pre-Treatment Pain Level 5  \"feels like a bruise or a punch\"  -AC      Post-Treatment Pain Level 0  -AC         Exercise 1    Exercise Name 1 B standing HS stretch  -AC      Reps 1 2  -AC      Time 1 30 sec hold  -AC         Exercise 2    Exercise Name 2 B seated ADD stretch  -AC      Reps 2 3  -AC      Time 2 30 sec hold  -AC         Exercise 3    Exercise Name 3 B piriformis stretch  -AC      Reps 3 2  -AC      Time 3 30 sec hold  -AC         Exercise 4    Exercise Name 4 pball CW/CCW  -AC      Time 4 2 mins each  -AC         Exercise 5    Exercise Name 5 Angry Cat S  -AC      Sets 5 1  -AC      Reps 5 10  -AC      Time 5 3\" hold  -AC         Exercise 6    Exercise Name 6 Child's pose  -AC      Reps 6 2  -AC      Time 6 30 sec hold  -AC      Additional Comments 2 pillows to rest B UEs over  -AC         Exercise 7    Exercise Name 7 Manual therapy  -AC      Additional Comments see flowsheet  -AC            User Key  (r) = Recorded By, (t) = Taken By, (c) = Cosigned By    Initials Name Provider Type    AC Zelda Oneal, PT Physical Therapist              Manual Rx (last 36 hours)     Manual Treatments     Row Name 03/16/23 1100             Manual Rx 1    Manual Rx 1 Location L erector spinae  -AC      Manual Rx 1 Type STM  -AC      Manual Rx 1 Duration 5 mins  -AC         Manual Rx 2    Manual Rx 2 Location L QL  -AC      Manual Rx 2 Type STM  -AC      Manual Rx 2 Duration 3 mins  -AC         Manual Rx 3    Manual Rx 3 Location B QL  -AC      Manual Rx 3 Type cross friction massage across B QL  -AC      Manual Rx 3 Duration 2 mins  -AC            User Key  (r) = Recorded By, (t) = Taken By, (c) = Cosigned By    Initials Name Provider Type    AC Zelda Oneal PT Physical Therapist                           PT OP Goals     Row Name 03/16/23 1100          PT Short Term Goals "    STG Date to Achieve 03/27/23  -     STG 1 Patient to be I with HEP with additions/changes prior to next recertification.  -     STG 2 Patient to demo proper diaphragmatic breathing x 3 minutes with good form and no cues from PT.  -     STG 2 Progress Ongoing;Progressing  -     STG 3 Patient to perform 20 repetitions of TrA contractions in various positions with good form and no cues from PT.  -     STG 4 Patient to demo proper activation of B multifidi in various functional positions with no cues from PT.  -        Long Term Goals    LTG Date to Achieve 04/20/23  -     LTG 1 Patient to perform 10 sit to/from stands with proper TA contraction with no difficulty or pain noted.  -     LTG 2 No TTP of L glute medius.  -     LTG 3 No TTP of L pifiromis.  -     LTG 4 Patient to peform 10 B SLR with proper deep core activation and breath support without cues from PT.  -     LTG 5 B LE strength 5/5.  -     LTG 6 Patient to be I with symptom/pain management in order to prevent reoccurrence of symptoms and resume normal caregiver tasks.  -        Time Calculation    PT Goal Re-Cert Due Date 03/27/23  -           User Key  (r) = Recorded By, (t) = Taken By, (c) = Cosigned By    Initials Name Provider Type     Zelda Oneal PT Physical Therapist                 Therapy Education  Education Details: HEP: child's pose and angry cat S  Given: HEP  Program: Progressed  How Provided: Verbal, Demonstration  Provided to: Patient  Level of Understanding: Verbalized, Demonstrated              Time Calculation:   Start Time: 1132  Stop Time: 1215  Time Calculation (min): 43 min  Total Timed Code Minutes- PT: 43 minute(s)  Therapy Charges for Today     Code Description Service Date Service Provider Modifiers Qty    59781892227 HC PT THER SUPP EA 15 MIN 3/16/2023 Zelda Oneal PT GP 1    30553229210 HC PT THER PROC EA 15 MIN 3/16/2023 Zelda Oneal PT GP 2    89345379487 HC PT MANUAL THERAPY EA  15 MIN 3/16/2023 Kimmy, Zelda, PT GP 1                    Autumn SONAM Oneal , DPT 3/16/2023

## 2023-03-20 ENCOUNTER — HOSPITAL ENCOUNTER (OUTPATIENT)
Dept: PHYSICAL THERAPY | Facility: HOSPITAL | Age: 33
Setting detail: THERAPIES SERIES
Discharge: HOME | End: 2023-03-20
Payer: COMMERCIAL

## 2023-03-20 DIAGNOSIS — M54.50 LOW BACK PAIN DURING PREGNANCY IN THIRD TRIMESTER: ICD-10-CM

## 2023-03-20 DIAGNOSIS — O26.893 LOW BACK PAIN DURING PREGNANCY IN THIRD TRIMESTER: ICD-10-CM

## 2023-03-20 DIAGNOSIS — Z34.93 THIRD TRIMESTER PREGNANCY: Primary | ICD-10-CM

## 2023-03-20 PROCEDURE — 97110 THERAPEUTIC EXERCISES: CPT

## 2023-03-20 PROCEDURE — 97140 MANUAL THERAPY 1/> REGIONS: CPT

## 2023-03-20 NOTE — THERAPY TREATMENT NOTE
Outpatient Physical Therapy Pelvic Health Treatment Note  [facility]     Patient Name: Sofiya Almaguer  : 1990  MRN: [id number]  Today's Date: 3/20/2023    Patient reports N/A% of improvement.  Next MD appt: 2023  Recertification: 3/27/2023      Therapy Diagnosis: LBP in 3rd trimester of pregnancy with myofascial restrictions     Visit Date: 2023    Visit Dx:    ICD-10-CM ICD-9-CM   1. Third trimester pregnancy  Z34.93 V22.2   2. Low back pain during pregnancy in third trimester  O26.893 646.80    M54.50 724.2       Patient Active Problem List   Diagnosis   • Acne   • Vitamin D deficiency   • Chronic bilateral low back pain without sciatica   • Hives   • Dermatitis   • Left otitis media   • Obesity in pregnancy, antepartum   • High-risk pregnancy in third trimester   • Diet controlled gestational diabetes mellitus (GDM) in third trimester   • Pelvic pain in pregnancy, antepartum, third trimester           PT Ortho     Row Name 23 1100       Posture/Observations    Posture/Observations Comments Patient demo R anterior rotated innominate. R medial malleoli appears longer in standing position. L ASIS slightly elevated.  -AC    Row Name 23 1000       Subjective Comments    Subjective Comments Patient reports the exercises have been going well. Patient reports round ligament pain. Patient reports it is the worse with seated. Patient reports she painted this weekend and had no pain. Patient still has difficulty/pain with getting up from seated position. Patient reports no other new complaints. Patient reports she hasnt slept well. Reports she can not get comfortable. Patient reports she sleeps now on her L side reclined. Patient reports the breathing still has been a challenge. Patient reports she is out of breath most of the time. Has to take small breaths.  -AC       Precautions and Contraindications    Precautions 3rd trimester of gestation  -AC    Contraindications no  supine position for extended amount of time  -AC       Subjective Pain    Able to rate subjective pain? yes  -AC    Pre-Treatment Pain Level 3  -AC          User Key  (r) = Recorded By, (t) = Taken By, (c) = Cosigned By    Initials Name Provider Type    Zelda De Santiago PT Physical Therapist                            PT Assessment/Plan     Row Name 03/20/23 1000          PT Assessment    Assessment Comments Patient tolerated pball bouncing activity for relaxation well this visit. Pelvic alignment reassessed this visit with slight R anterior rotated innominate present. L medial malleoli also slightly elevated compared to R in standing position. Patient demo impoved diaphragmatic breathing in L semi reclined sidelying position this visit. Patient would benefit from education on variuous positions to prep for child delievery as well as reinforcement of breath support for delivery. Recheck next visit. Patient also may benefit from a heel lift to improve pelvic alignment.  -AC     Rehab Potential Good  -AC     Patient/caregiver participated in establishment of treatment plan and goals Yes  -AC     Patient would benefit from skilled therapy intervention Yes  -AC        PT Plan    PT Frequency 2x/week  -AC     PT Plan Comments Recheck next visit. Heel lift to R shoe next visit. Reassess pelvic alignment. Begin core stability.  -AC           User Key  (r) = Recorded By, (t) = Taken By, (c) = Cosigned By    Initials Name Provider Type    Zelda De Santiago PT Physical Therapist                   OP Exercises     Row Name 03/20/23 1000             Subjective Comments    Subjective Comments Patient reports the exercises have been going well. Patient reports round ligament pain. Patient reports it is the worse with seated. Patient reports she painted this weekend and had no pain. Patient still has difficulty/pain with getting up from seated position. Patient reports no other new complaints. Patient reports she hasnt  "slept well. Reports she can not get comfortable. Patient reports she sleeps now on her L side reclined. Patient reports the breathing still has been a challenge. Patient reports she is out of breath most of the time. Has to take small breaths.  -AC         Subjective Pain    Able to rate subjective pain? yes  -AC      Pre-Treatment Pain Level 3  -AC      Post-Treatment Pain Level 0  -AC         Exercise 1    Exercise Name 1 B standing HS stretch  -AC      Reps 1 2  -AC      Time 1 30 sec hold  -AC         Exercise 2    Exercise Name 2 B seated ADD stretch  -AC      Reps 2 2  -AC      Time 2 30 sec hold  -AC         Exercise 3    Exercise Name 3 B piriformis stretch  -AC      Reps 3 2  -AC      Time 3 30 sec hold  -AC         Exercise 4    Exercise Name 4 pball bouncing  -AC      Time 4 2 mins  -AC         Exercise 5    Exercise Name 5 Angry Cat S  -AC      Sets 5 1  -AC      Reps 5 10  -AC      Time 5 3\" hold  -AC         Exercise 6    Exercise Name 6 Child pose with pball  -AC      Time 6 2 mins  -AC         Exercise 7    Exercise Name 7 Manual therapy  -AC      Additional Comments see flowhseet  -AC         Exercise 8    Exercise Name 8 Pelvic alignment assessment  -AC      Additional Comments see flowhseet  -AC         Exercise 9    Exercise Name 9 Diaphramgatic breathing in semireclined sidelying position  -AC      Time 9 5 mins  -AC      Additional Comments L sidelying  -AC            User Key  (r) = Recorded By, (t) = Taken By, (c) = Cosigned By    Initials Name Provider Type    AC Zelda Oneal, PT Physical Therapist              Manual Rx (last 36 hours)     Manual Treatments     Row Name 03/20/23 0900             Manual Rx 1    Manual Rx 1 Location L erector spinae  -AC      Manual Rx 1 Type STM  -AC      Manual Rx 1 Duration 6 mins  -AC         Manual Rx 2    Manual Rx 2 Location B QL  -AC      Manual Rx 2 Type cross friction massage across B QL  -AC      Manual Rx 2 Duration 3 mins  -AC         Manual " Rx 3    Manual Rx 3 Location Pelvic alignment  -      Manual Rx 3 Duration 5 mins  -            User Key  (r) = Recorded By, (t) = Taken By, (c) = Cosigned By    Initials Name Provider Type    Zelda De Santiago PT Physical Therapist                           PT OP Goals     Row Name 03/20/23 1000          PT Short Term Goals    STG Date to Achieve 03/27/23  -     STG 1 Patient to be I with HEP with additions/changes prior to next recertification.  -     STG 1 Progress Ongoing;Progressing  -     STG 2 Patient to demo proper diaphragmatic breathing x 3 minutes with good form and no cues from PT.  -     STG 2 Progress Met  -     STG 3 Patient to perform 20 repetitions of TrA contractions in various positions with good form and no cues from PT.  -     STG 4 Patient to demo proper activation of B multifidi in various functional positions with no cues from PT.  -        Long Term Goals    LTG Date to Achieve 04/20/23  -     LTG 1 Patient to perform 10 sit to/from stands with proper TA contraction with no difficulty or pain noted.  -     LTG 2 No TTP of L glute medius.  -     LTG 3 No TTP of L pifiromis.  -     LTG 4 Patient to peform 10 B SLR with proper deep core activation and breath support without cues from PT.  -     LTG 5 B LE strength 5/5.  -     LTG 6 Patient to be I with symptom/pain management in order to prevent reoccurrence of symptoms and resume normal caregiver tasks.  -        Time Calculation    PT Goal Re-Cert Due Date 03/27/23  -           User Key  (r) = Recorded By, (t) = Taken By, (c) = Cosigned By    Initials Name Provider Type    Zelda De Santiago PT Physical Therapist                                Time Calculation:   Start Time: 1016  Stop Time: 1114  Time Calculation (min): 58 min  Total Timed Code Minutes- PT: 58 minute(s)  Therapy Charges for Today     Code Description Service Date Service Provider Modifiers Qty    95431343031  PT THER SUPP EA 15 MIN  3/20/2023 Oneal, Autumn, PT GP 1    96540064065 HC PT MANUAL THERAPY EA 15 MIN 3/20/2023 Oneal, Zelda, PT GP 1    72773145541 HC PT THER PROC EA 15 MIN 3/20/2023 Oneal, Zelda, PT GP 3                    Autumn Lehi, PT , DPT 3/20/2023

## 2023-03-22 ENCOUNTER — APPOINTMENT (OUTPATIENT)
Dept: PHYSICAL THERAPY | Facility: HOSPITAL | Age: 33
End: 2023-03-22
Payer: COMMERCIAL

## 2023-03-23 ENCOUNTER — ROUTINE PRENATAL (OUTPATIENT)
Dept: OBSTETRICS AND GYNECOLOGY | Facility: CLINIC | Age: 33
End: 2023-03-23
Payer: COMMERCIAL

## 2023-03-23 VITALS — DIASTOLIC BLOOD PRESSURE: 70 MMHG | WEIGHT: 163 LBS | SYSTOLIC BLOOD PRESSURE: 118 MMHG | BODY MASS INDEX: 30.8 KG/M2

## 2023-03-23 DIAGNOSIS — Z36.85 ANTENATAL SCREENING FOR STREPTOCOCCUS B: ICD-10-CM

## 2023-03-23 DIAGNOSIS — O24.410 DIET CONTROLLED GESTATIONAL DIABETES MELLITUS (GDM) IN THIRD TRIMESTER: ICD-10-CM

## 2023-03-23 DIAGNOSIS — O99.210 OBESITY IN PREGNANCY, ANTEPARTUM: ICD-10-CM

## 2023-03-23 DIAGNOSIS — O09.93 HIGH-RISK PREGNANCY IN THIRD TRIMESTER: ICD-10-CM

## 2023-03-23 DIAGNOSIS — O09.523 MULTIGRAVIDA OF ADVANCED MATERNAL AGE IN THIRD TRIMESTER: ICD-10-CM

## 2023-03-23 DIAGNOSIS — Z3A.36 36 WEEKS GESTATION OF PREGNANCY: Primary | ICD-10-CM

## 2023-03-23 PROCEDURE — 87653 STREP B DNA AMP PROBE: CPT | Performed by: NURSE PRACTITIONER

## 2023-03-23 NOTE — PROGRESS NOTES
CC: Prenatal visit    Sofiya Almaguer is a 33 y.o.  at 36w0d.  Doing well.  Denies N/V, dysuria, abnormal vaginal d/c, headaches, heartburn, constipation, cramping, regular contractions, LOF, or VB.  Reports good FM. Reports blood sugars are doing well, feels her blood sugar tend to run low more than high. Still using Dexcom and also, doing fingersticks. Will send her glucose log via [employer]. Pt voices she is getting a little nervous since this is her 6th pregnancy and recently been hearing some birth stories that worry her. Discussed doing an IOL at 39 weeks for GDM, pt feels she will probably go into labor before than. Plan to schedule IOL at next visit.      /70   Wt 73.9 kg (163 lb)   LMP 2022 (Approximate)   BMI 30.80 kg/m²   SVE: Deferred     Fetal Heart Rate: 125     US: Cephalic. HYACINTH 13.82cm. BPP 8/8. EFW 2508 gm (5lb 8oz), 20%ile AC 27%ile.     10/22 Problems (from 10/24/22 to present)     Problem Noted Resolved    Multigravida of advanced maternal age in third trimester 3/23/2023 by Aliya Simmons APRN No    Pelvic pain in pregnancy, antepartum, third trimester 3/9/2023 by Aliya Simmons APRN No    Overview Signed 3/9/2023 11:46 AM by Aliya Simmons APRN     Sees physical therapy         Diet controlled gestational diabetes mellitus (GDM) in third trimester 2023 by Aliya Simmons APRN No    Overview Addendum 3/23/2023 12:58 PM by Aliya Simmons APRN       IOL at 39 weeks if continues to be GDM diet controlled.          High-risk pregnancy in third trimester 2022 by Анна Andre DO No    Overview Addendum 2023  2:08 PM by Aliya Simmons APRN     Female fetus-no name yet  BC?  Breastfeeding/ pump rx on 2023  Needs postpartup PAP         Obesity in pregnancy, antepartum 2022 by Aliya Simmons APRN No    Chronic bilateral low back pain without sciatica 2018 by Eva Jon DNP, APRN No    Overview Signed 3/9/2023  11:46 AM by Aliya Simmons APRN     Sees chiropractor and physical therayp               A/P: Sofiya Almaguer is a 33 y.o.  at 36w0d.  - Reassuring GS and BPP today. Discussed weekly BPP not really indicated for her as her GDM is diet controlled. I had previously scheduled some appt with her at her last visit. Will cancel scheduled BPP appts at this time unless patient wants to keep them. Pt in agreement to cancel.  - Plan of IOL at 39 weeks  - RTC in 1 week       Diagnosis Plan   1. 36 weeks gestation of pregnancy        2. Diet controlled gestational diabetes mellitus (GDM) in third trimester        3. High-risk pregnancy in third trimester        4. Obesity in pregnancy, antepartum        5.  screening for streptococcus B  Group B Strep (Molecular) - Swab, Vaginal/Rectum      6. Multigravida of advanced maternal age in third trimester          LEEANN Garduno  3/23/2023  13:08 CDT

## 2023-03-24 DIAGNOSIS — O99.210 OBESITY IN PREGNANCY, ANTEPARTUM: ICD-10-CM

## 2023-03-24 DIAGNOSIS — O09.93 HIGH-RISK PREGNANCY IN THIRD TRIMESTER: ICD-10-CM

## 2023-03-24 DIAGNOSIS — O24.410 DIET CONTROLLED GESTATIONAL DIABETES MELLITUS (GDM) IN THIRD TRIMESTER: ICD-10-CM

## 2023-03-24 LAB — GROUP B STREP, DNA: NEGATIVE

## 2023-03-30 ENCOUNTER — ROUTINE PRENATAL (OUTPATIENT)
Dept: OBSTETRICS AND GYNECOLOGY | Facility: CLINIC | Age: 33
End: 2023-03-30
Payer: COMMERCIAL

## 2023-03-30 ENCOUNTER — HOSPITAL ENCOUNTER (OUTPATIENT)
Dept: PHYSICAL THERAPY | Facility: HOSPITAL | Age: 33
Setting detail: THERAPIES SERIES
Discharge: HOME | End: 2023-03-30
Payer: COMMERCIAL

## 2023-03-30 VITALS — DIASTOLIC BLOOD PRESSURE: 70 MMHG | SYSTOLIC BLOOD PRESSURE: 122 MMHG

## 2023-03-30 DIAGNOSIS — O09.93 HIGH-RISK PREGNANCY IN THIRD TRIMESTER: ICD-10-CM

## 2023-03-30 DIAGNOSIS — M54.50 CHRONIC BILATERAL LOW BACK PAIN WITHOUT SCIATICA: ICD-10-CM

## 2023-03-30 DIAGNOSIS — M54.50 LOW BACK PAIN DURING PREGNANCY IN THIRD TRIMESTER: ICD-10-CM

## 2023-03-30 DIAGNOSIS — O26.893 LOW BACK PAIN DURING PREGNANCY IN THIRD TRIMESTER: ICD-10-CM

## 2023-03-30 DIAGNOSIS — O24.410 DIET CONTROLLED GESTATIONAL DIABETES MELLITUS (GDM) IN THIRD TRIMESTER: ICD-10-CM

## 2023-03-30 DIAGNOSIS — Z3A.37 37 WEEKS GESTATION OF PREGNANCY: Primary | ICD-10-CM

## 2023-03-30 DIAGNOSIS — O26.893 PELVIC PAIN IN PREGNANCY, ANTEPARTUM, THIRD TRIMESTER: ICD-10-CM

## 2023-03-30 DIAGNOSIS — G89.29 CHRONIC BILATERAL LOW BACK PAIN WITHOUT SCIATICA: ICD-10-CM

## 2023-03-30 DIAGNOSIS — Z34.93 THIRD TRIMESTER PREGNANCY: Primary | ICD-10-CM

## 2023-03-30 DIAGNOSIS — O09.523 MULTIGRAVIDA OF ADVANCED MATERNAL AGE IN THIRD TRIMESTER: ICD-10-CM

## 2023-03-30 DIAGNOSIS — R10.2 PELVIC PAIN IN PREGNANCY, ANTEPARTUM, THIRD TRIMESTER: ICD-10-CM

## 2023-03-30 DIAGNOSIS — O99.210 OBESITY IN PREGNANCY, ANTEPARTUM: ICD-10-CM

## 2023-03-30 PROCEDURE — 97140 MANUAL THERAPY 1/> REGIONS: CPT

## 2023-03-30 PROCEDURE — 97110 THERAPEUTIC EXERCISES: CPT

## 2023-03-30 NOTE — PROGRESS NOTES
CC: Prenatal visit    Sofiya Almaguer is a 33 y.o.  at 37w0d.  Doing well.  Denies N/V, dysuria, abnormal vaginal d/c, headaches, heartburn, constipation, cramping, regular contractions, LOF, or VB.  Reports good FM. Blood sugars are all normal.     Glucose log 3/23-3/29    Fastin-89   1hr PP B   1hr PP L   1he PP D     /70   LMP 2022 (Approximate)   SVE: Deferred   Fundal Height (cm): 36 cm  Fetal Heart Rate: 140    10/22 Problems (from 10/24/22 to present)     Problem Noted Resolved    Multigravida of advanced maternal age in third trimester 3/23/2023 by Aliya Simmons APRN No    Pelvic pain in pregnancy, antepartum, third trimester 3/9/2023 by Aliya Simmons APRN No    Overview Signed 3/9/2023 11:46 AM by Aliya Simmons APRN     Sees physical therapy         Diet controlled gestational diabetes mellitus (GDM) in third trimester 2023 by Aliya Simmons APRN No    Overview Addendum 3/23/2023 12:58 PM by Aliya Simmons APRN       IOL at 39 weeks if continues to be GDM diet controlled.          High-risk pregnancy in third trimester 2022 by Анна Andre, DO No    Overview Addendum 2023  2:08 PM by Aliya Simmons APRN     Female fetus-no name yet  BC?  Breastfeeding/ pump rx on 2023  Needs postpartup PAP         Obesity in pregnancy, antepartum 2022 by Aliya Simmons APRN No    Chronic bilateral low back pain without sciatica 2018 by Eva Jon DNP, APRN No    Overview Signed 3/9/2023 11:46 AM by Aliya Simmons APRN     Sees chiropractor and physical therayp               A/P: Sofiya Almaguer is a 33 y.o.  at 37w0d.  - IOL for GDM diet controlled scheduled at 39w+1d for 2023 at 5am with Dr Amaral  - [facility] in 1  week     Diagnosis Plan   1. 37 weeks gestation of pregnancy        2. Pelvic pain in pregnancy, antepartum, third trimester        3. Diet controlled gestational diabetes  mellitus (GDM) in third trimester        4. High-risk pregnancy in third trimester        5. Obesity in pregnancy, antepartum        6. Chronic bilateral low back pain without sciatica        7. Multigravida of advanced maternal age in third trimester          Aliya Ornelas, APRN  3/30/2023  12:57 CDT

## 2023-03-30 NOTE — THERAPY TREATMENT NOTE
Outpatient Physical Therapy Pelvic Health Treatment Note  [facility]     Patient Name: Sofiya Almaguer  : 1990  MRN: [id number]  Today's Date: 3/30/2023    Patient reports N/A% of improvement.  Next MD appt: 2023  Recertification: 2023       Therapy Diagnosis: LBP in 3rd trimester of pregnancy with myofascial restrictions     Visit Date: 2023    Visit Dx:    ICD-10-CM ICD-9-CM   1. Third trimester pregnancy  Z34.93 V22.2   2. Low back pain during pregnancy in third trimester  O26.893 646.80    M54.50 724.2       Patient Active Problem List   Diagnosis   • Acne   • Vitamin D deficiency   • Chronic bilateral low back pain without sciatica   • Hives   • Dermatitis   • Left otitis media   • Obesity in pregnancy, antepartum   • High-risk pregnancy in third trimester   • Diet controlled gestational diabetes mellitus (GDM) in third trimester   • Pelvic pain in pregnancy, antepartum, third trimester   • Multigravida of advanced maternal age in third trimester         Pelvic Health     Row Name 23 1100             Subjective Comments    Subjective Comments Patient reports she is pretty sure her baby dropped. Patient reports she can only walk 3 steps without feeling urge to pee. Patient reports she is scheduled for induction on 2023. Patient reports her pain has felt a little bit better. Reports she has been taking it easy. Just slight discomfort in low back. comes and goes. feeling of constipation or trapped gas. Reports soreness after stands on L side. Round ligament pain still constant.  -AC         Pregnancy Questions    Number of Pregnancies 6  -AC      Number of Miscarriages 0  -AC      Has the patient had an ? No  -AC      Number of Children 5  -AC      Type of Previous Deliveries Vaginal;Other (comment)  -AC      Due Date 23  -AC      Do you have radicular pain or numbness? No  -AC      Are you currently exercising? No  -AC      Pregnancy Comments 2  episiotomies  -AC         Pain Assessment    Pain Assessment No/denies pain  -AC      Pain Score 0  -AC         Lumbar/SI Special Tests    Standing Flexion Test (SI Dysfunction) Bilateral:;Positive  -AC      SI Compression Test (SI Dysfunction) Bilateral:;Negative  -AC      SI Distraction Test (SI Dysfunction) Bilateral:;Negative  -AC      FELICIA (hip vs. SI Dysfunction) Left:;Positive  -AC      FAIR Test (Piriformis Syndrome) Bilateral:;Negative  -AC         Lumbosacral Palpation    Lumbosacral Palpation? Yes  -AC      Piriformis Left:;Tender  -AC      Gluteus Melo Left:;Tender  -AC      Lumbosacral Palpation Comments TTP of L glute med  -AC         Pelvic Floor Muscle    Internal Pelvic Floor Comments deferred  -AC      External Pelvic Floor Comments deferred  -AC         Observations    Perineal Observation Performed? Yes  -AC      Posture Observations No in acute distrss.  -AC         MMT (Manual Muscle Testing)    General MMT Comments B LE strength 5/5 except B hip abd 4/5 and B hip IR 4+/5.  -AC            User Key  (r) = Recorded By, (t) = Taken By, (c) = Cosigned By    Initials Name Provider Type     Zelda Oneal, PT Physical Therapist               PT Ortho     Row Name 03/30/23 1100       Precautions and Contraindications    Precautions 3rd trimester of gestation  -AC    Contraindications no supine position for extended amount of time  -AC       Subjective Pain    Able to rate subjective pain? yes  -AC    Pre-Treatment Pain Level 0  -AC          User Key  (r) = Recorded By, (t) = Taken By, (c) = Cosigned By    Initials Name Provider Type     Zelda Oneal PT Physical Therapist                            PT Assessment/Plan     Row Name 03/30/23 1100          PT Assessment    Functional Limitations Performance in work activities;Limitations in functional capacity and performance;Limitations in community activities  -AC     Impairments Posture;Pain;Muscle strength;Impaired muscle  endurance;Impaired flexibility  -AC     Assessment Comments Improved breathing patterns noted this visit. Improved diaphragm activation with limited chest excursion. TTP of L glute med, L glute max, and L piriformis still noted but less guarding and tautness present. Patient demo improvements of strength and overall reduction of symptoms. Patent would benefit from continued skilled PT to meet remaining goals and prepare for labor and deliveryin 2-3 weeks. Continue to focus on overalll flexibility, mobility, and overall downtraining of PF.  -AC     Please refer to paper survey for additional self-reported information No  -AC     Rehab Potential Good  -AC     Patient/caregiver participated in establishment of treatment plan and goals Yes  -AC     Patient would benefit from skilled therapy intervention Yes  -AC        PT Plan    PT Frequency 2x/week  -AC     Predicted Duration of Therapy Intervention (PT) 2-3 more visits  -AC     PT Plan Comments Next visit add seated and standing core activities. Continue stretching and mobility routine. Manual therapy prn.  -AC           User Key  (r) = Recorded By, (t) = Taken By, (c) = Cosigned By    Initials Name Provider Type    Zelda De Santiago, PT Physical Therapist                   OP Exercises     Row Name 03/30/23 1100             Subjective Comments    Subjective Comments Patient reports she is pretty sure her baby dropped. Patient reports she can only walk 3 steps without feeling urge to pee. Patient reports she is scheduled for induction on 4/14/2023. Patient reports her pain has felt a little bit better. Reports she has been taking it easy. Just slight discomfort in low back. comes and goes. feeling of constipation or trapped gas. Reports soreness after stands on L side. Round ligament pain still constant.  -AC         Subjective Pain    Able to rate subjective pain? yes  -AC      Pre-Treatment Pain Level 0  -AC      Post-Treatment Pain Level 0  -AC         Exercise  1    Exercise Name 1 B standing HS stretch  -AC      Reps 1 2  -AC      Time 1 30 sec hold  -AC         Exercise 2    Exercise Name 2 B seated ADD stretch  -AC      Reps 2 2  -AC      Time 2 30 sec hold  -AC         Exercise 3    Exercise Name 3 B piriformis stretch  -AC      Reps 3 2  -AC      Time 3 30 sec hold  -AC         Exercise 4    Exercise Name 4 Happy baby pose  -AC      Reps 4 3  -AC      Time 4 30 sec hold  -AC         Exercise 5    Exercise Name 5 Reassessment  -AC      Additional Comments see flowsheet  -AC         Exercise 6    Exercise Name 6 Manual therapy  -AC      Additional Comments see flowsheet  -AC         Exercise 7    Exercise Name 7 Sidelying SKTC  -AC      Reps 7 3  -AC      Time 7 30 sec hold  -AC      Additional Comments R sidelying  -AC         Exercise 8    Exercise Name 8 Angry cat S  -AC      Sets 8 1  -AC      Reps 8 10  -AC      Time 8 3' hold  -AC      Additional Comments emphasis on breathing  -AC         Exercise 9    Exercise Name 9 Patient education  -AC      Additional Comments see flowsheet  -AC            User Key  (r) = Recorded By, (t) = Taken By, (c) = Cosigned By    Initials Name Provider Type    AC Zelda Oneal, PT Physical Therapist              Manual Rx (last 36 hours)     Manual Treatments     Row Name 03/30/23 1100             Manual Rx 1    Manual Rx 1 Location L QL  -AC      Manual Rx 1 Type STM  -AC         Manual Rx 2    Manual Rx 2 Location L glute med  -AC      Manual Rx 2 Type STM/MFR  -AC         Manual Rx 3    Manual Rx 3 Location Pelvic alignment  -AC         Manual Rx 4    Manual Rx 4 Location R LE  -AC      Manual Rx 4 Type anterior rotated innominate MET  -AC            User Key  (r) = Recorded By, (t) = Taken By, (c) = Cosigned By    Initials Name Provider Type    AC Zelda Oneal, PT Physical Therapist                           PT OP Goals     Row Name 03/30/23 1100          PT Short Term Goals    STG Date to Achieve 03/27/23  -AC     STG 1  Patient to be I with HEP with additions/changes prior to next recertification.  -     STG 1 Progress Met;Ongoing  -     STG 2 Patient to demo proper diaphragmatic breathing x 3 minutes with good form and no cues from PT.  -     STG 2 Progress Met  -     STG 3 Patient to perform 20 repetitions of TrA contractions in various positions with good form and no cues from PT.  -     STG 4 Patient to demo proper activation of B multifidi in various functional positions with no cues from PT.  -        Long Term Goals    LTG Date to Achieve 04/20/23  -     LTG 1 Patient to perform 10 sit to/from stands with proper TA contraction with no difficulty or pain noted.  -     LTG 2 No TTP of L glute medius.  -     LTG 2 Progress Ongoing;Progressing  -     LTG 3 No TTP of L pifiromis.  -     LTG 3 Progress Ongoing;Progressing  -     LTG 4 Patient to peform 10 B SLR with proper deep core activation and breath support without cues from PT.  -     LTG 5 B LE strength 5/5.  -     LTG 6 Patient to be I with symptom/pain management in order to prevent reoccurrence of symptoms and resume normal caregiver tasks.  -        Time Calculation    PT Goal Re-Cert Due Date 04/20/23  -           User Key  (r) = Recorded By, (t) = Taken By, (c) = Cosigned By    Initials Name Provider Type     Zelda Oneal, PT Physical Therapist                                Time Calculation:   Start Time: 1117  Stop Time: 1211  Time Calculation (min): 54 min  Total Timed Code Minutes- PT: 54 minute(s)  Therapy Charges for Today     Code Description Service Date Service Provider Modifiers Qty    91867323549 HC PT THER SUPP EA 15 MIN 3/30/2023 Weston Zedla, PT GP 1    21759592505 HC PT MANUAL THERAPY EA 15 MIN 3/30/2023 WestonZelda, PT GP 2    63647523776 HC PT THER PROC EA 15 MIN 3/30/2023 WestonZelda, PT GP 2                    Zelda QuigleylandSONAM , DPT 3/30/2023

## 2023-04-05 ENCOUNTER — HOSPITAL ENCOUNTER (OUTPATIENT)
Dept: PHYSICAL THERAPY | Facility: HOSPITAL | Age: 33
Setting detail: THERAPIES SERIES
Discharge: HOME | End: 2023-04-05
Payer: COMMERCIAL

## 2023-04-05 DIAGNOSIS — M54.50 LOW BACK PAIN DURING PREGNANCY IN THIRD TRIMESTER: ICD-10-CM

## 2023-04-05 DIAGNOSIS — O26.893 LOW BACK PAIN DURING PREGNANCY IN THIRD TRIMESTER: ICD-10-CM

## 2023-04-05 DIAGNOSIS — Z34.93 THIRD TRIMESTER PREGNANCY: Primary | ICD-10-CM

## 2023-04-05 PROCEDURE — 97110 THERAPEUTIC EXERCISES: CPT

## 2023-04-05 PROCEDURE — 97140 MANUAL THERAPY 1/> REGIONS: CPT

## 2023-04-05 NOTE — THERAPY TREATMENT NOTE
Outpatient Physical Therapy Pelvic Health Treatment Note  [facility]     Patient Name: oSfiya Almaguer  : 1990  MRN: [id number]  Today's Date: 2023   Visits:     Patient reports N/A% of improvement.  Next MD appt: 2023  Recertification: 2023       Therapy Diagnosis: LBP in 3rd trimester of pregnancy with myofascial restrictions     Visit Date: 2023    Visit Dx:    ICD-10-CM ICD-9-CM   1. Third trimester pregnancy  Z34.93 V22.2   2. Low back pain during pregnancy in third trimester  O26.893 646.80    M54.50 724.2       Patient Active Problem List   Diagnosis   • Acne   • Vitamin D deficiency   • Chronic bilateral low back pain without sciatica   • Hives   • Dermatitis   • Left otitis media   • Obesity in pregnancy, antepartum   • High-risk pregnancy in third trimester   • Diet controlled gestational diabetes mellitus (GDM) in third trimester   • Pelvic pain in pregnancy, antepartum, third trimester   • Multigravida of advanced maternal age in third trimester         Pelvic Health     Row Name 23 1100             Pregnancy Questions    Number of Pregnancies 6  -AC      Number of Miscarriages 0  -AC      Has the patient had an ? No  -AC      Number of Children 5  -AC      Type of Previous Deliveries Vaginal;Other (comment)  -AC      Due Date 23  -AC      Do you have radicular pain or numbness? No  -AC      Are you currently exercising? No  -AC      Pregnancy Comments 2 episiotomies  -AC            User Key  (r) = Recorded By, (t) = Taken By, (c) = Cosigned By    Initials Name Provider Type    Zelda De Santiago PT Physical Therapist               PT Ortho     Row Name 23 1100       Precautions and Contraindications    Precautions 3rd trimester of gestation  -AC    Contraindications no supine position for extended amount of time  -AC       Subjective Pain    Able to rate subjective pain? yes  -AC          User Key  (r) = Recorded By, (t) = Taken  By, (c) = Cosigned By    Initials Name Provider Type     Zelda Oneal, PT Physical Therapist                            PT Assessment/Plan     Row Name 23 1100          PT Assessment    Assessment Comments Patient tolerated tx well this visit. KT taping performed for max support around lower abdomen to help lower abdominal pressure. Patient reports reduction of pain and pressure after applying KT tape technique. Patient tolerated new gentle pelvic girdle mobility activities. Will continue to progress as tolerated. Will begin post richie and delivery education next visit.  -AC     Rehab Potential Good  -AC     Patient/caregiver participated in establishment of treatment plan and goals Yes  -AC     Patient would benefit from skilled therapy intervention Yes  -AC        PT Plan    PT Frequency 2x/week  -AC     PT Plan Comments add seated TA contractions and squats with breath support. Teach deep squat position. Discuss post-richie options for PT next visit.  -AC           User Key  (r) = Recorded By, (t) = Taken By, (c) = Cosigned By    Initials Name Provider Type    Zelda De Santiago, PT Physical Therapist                   OP Exercises     Row Name 23 1100             Subjective Comments    Subjective Comments Patient rpeorts her pressure in lower abdomen has been bothering her. Reports she had a scare over the weekend. Reports she thought she was going into labor. False alarm. A lot of silas mireles and leg cramps. Patient reports back pain is feeling better. A lot better than when she started. Reports she would like to KT tape to help support lower abdomen.  -AC         Subjective Pain    Able to rate subjective pain? yes  -AC      Pre-Treatment Pain Level 0  -AC      Post-Treatment Pain Level 0  -AC         Exercise 1    Exercise Name 1 B standing HS stretch  -AC      Reps 1 2  -AC      Time 1 30 sec hold  -AC         Exercise 2    Exercise Name 2 B seated ADD stretch  -AC      Reps 2 2  -AC       Time 2 30 sec hold  -AC         Exercise 3    Exercise Name 3 B piriformis stretch  -AC      Reps 3 2  -AC      Time 3 30 sec hold  -AC         Exercise 4    Exercise Name 4 Happy baby pose  -AC      Reps 4 2  -AC      Time 4 30 sec hold  -AC         Exercise 5    Exercise Name 5 Sidelying SKTC  -AC      Reps 5 3  -AC      Time 5 30 sec hold  -AC      Additional Comments R sidelying  -AC         Exercise 6    Exercise Name 6 Manual therapy  -AC      Additional Comments see flowsheet  -AC         Exercise 7    Exercise Name 7 QP pelvic tilts  -AC      Sets 7 1  -AC      Reps 7 20  -AC      Additional Comments emphasis on breath support and isolation of pelvic girdle from lumbar spine  -AC         Exercise 8    Exercise Name 8 QP lateral tilts  -AC      Sets 8 1  -AC      Reps 8 20  -AC         Exercise 9    Exercise Name 9 Seated Diaphragmatic breathing  -AC      Time 9 3 minutes  -AC      Additional Comments Tband around ribs for tactile cue - blue tband  -AC         Exercise 10    Exercise Name 10 KT Tape  -AC      Additional Comments Max support tape with 2 diagonal strips starting from B ASIS to base of ribs on both sides. 2 vertical strips parallel to navel on B side. 1 strip under lower abdomen near round ligament to support weight of baby.  -AC            User Key  (r) = Recorded By, (t) = Taken By, (c) = Cosigned By    Initials Name Provider Type    Zelda De Santiago PT Physical Therapist              Manual Rx (last 36 hours)     Manual Treatments     Row Name 04/05/23 1300 04/05/23 1100          Manual Rx 1    Manual Rx 1 Location B erector spinae  -AC L QL  -AC     Manual Rx 1 Type STM with pelvic tilts for elongating tissues  -AC --     Manual Rx 1 Duration 5 minutes  -AC --        Manual Rx 2    Manual Rx 2 Location L QL  -AC L glute med  -AC     Manual Rx 2 Type STM  -AC --     Manual Rx 2 Duration 2 minutes  -AC --           User Key  (r) = Recorded By, (t) = Taken By, (c) = Cosigned By    Initials  Name Provider Type     Zelda Oneal PT Physical Therapist                           PT OP Goals     Row Name 04/05/23 1100          PT Short Term Goals    STG Date to Achieve 03/27/23  -     STG 1 Patient to be I with HEP with additions/changes prior to next recertification.  -     STG 1 Progress Met;Ongoing  -     STG 2 Patient to demo proper diaphragmatic breathing x 3 minutes with good form and no cues from PT.  -     STG 2 Progress Met  -     STG 3 Patient to perform 20 repetitions of TrA contractions in various positions with good form and no cues from PT.  -     STG 4 Patient to demo proper activation of B multifidi in various functional positions with no cues from PT.  -        Long Term Goals    LTG Date to Achieve 04/20/23  -     LTG 1 Patient to perform 10 sit to/from stands with proper TA contraction with no difficulty or pain noted.  -     LTG 2 No TTP of L glute medius.  -     LTG 2 Progress Ongoing;Progressing  -     LTG 3 No TTP of L pifiromis.  -     LTG 3 Progress Ongoing;Progressing  -     LTG 4 Patient to peform 10 B SLR with proper deep core activation and breath support without cues from PT.  -     LTG 5 B LE strength 5/5.  -     LTG 6 Patient to be I with symptom/pain management in order to prevent reoccurrence of symptoms and resume normal caregiver tasks.  -        Time Calculation    PT Goal Re-Cert Due Date 04/20/23  -           User Key  (r) = Recorded By, (t) = Taken By, (c) = Cosigned By    Initials Name Provider Type     Zelda Oneal PT Physical Therapist                 Therapy Education  Education Details: HEP: QP Pelvic tilts and lateral tilts; seated breathing with tband  Given: HEP  Program: New, Progressed  How Provided: Verbal, Demonstration  Provided to: Patient  Level of Understanding: Verbalized, Demonstrated              Time Calculation:   Start Time: 1107  Stop Time: 1211  Time Calculation (min): 64 min  Total Timed Code  Minutes- PT: 64 minute(s)  Therapy Charges for Today     Code Description Service Date Service Provider Modifiers Qty    85431410780 HC PT THER SUPP EA 15 MIN 4/5/2023 Copeland, Autumn, PT GP 1    36697233969 HC PT MANUAL THERAPY EA 15 MIN 4/5/2023 Copeland, Autumn, PT GP 1    24904355351 HC PT THER PROC EA 15 MIN 4/5/2023 Copeland, Autumn, PT GP 3    05542789210 HC PT THER SUPP EA 15 MIN 4/5/2023 Copeland, Autumn, PT GP 1                    Autumn Kimball, PT , DPT 4/5/2023

## 2023-04-06 ENCOUNTER — HOSPITAL ENCOUNTER (OUTPATIENT)
Dept: PHYSICAL THERAPY | Facility: HOSPITAL | Age: 33
Setting detail: THERAPIES SERIES
Discharge: HOME | End: 2023-04-06
Payer: COMMERCIAL

## 2023-04-06 ENCOUNTER — ROUTINE PRENATAL (OUTPATIENT)
Dept: OBSTETRICS AND GYNECOLOGY | Facility: CLINIC | Age: 33
End: 2023-04-06
Payer: COMMERCIAL

## 2023-04-06 VITALS — WEIGHT: 161 LBS | BODY MASS INDEX: 30.42 KG/M2 | DIASTOLIC BLOOD PRESSURE: 64 MMHG | SYSTOLIC BLOOD PRESSURE: 102 MMHG

## 2023-04-06 DIAGNOSIS — M54.50 CHRONIC BILATERAL LOW BACK PAIN WITHOUT SCIATICA: ICD-10-CM

## 2023-04-06 DIAGNOSIS — O24.410 DIET CONTROLLED GESTATIONAL DIABETES MELLITUS (GDM) IN THIRD TRIMESTER: ICD-10-CM

## 2023-04-06 DIAGNOSIS — Z34.93 THIRD TRIMESTER PREGNANCY: Primary | ICD-10-CM

## 2023-04-06 DIAGNOSIS — O26.893 LOW BACK PAIN DURING PREGNANCY IN THIRD TRIMESTER: ICD-10-CM

## 2023-04-06 DIAGNOSIS — R10.2 PELVIC PAIN IN PREGNANCY, ANTEPARTUM, THIRD TRIMESTER: ICD-10-CM

## 2023-04-06 DIAGNOSIS — O26.893 PELVIC PAIN IN PREGNANCY, ANTEPARTUM, THIRD TRIMESTER: ICD-10-CM

## 2023-04-06 DIAGNOSIS — O09.523 MULTIGRAVIDA OF ADVANCED MATERNAL AGE IN THIRD TRIMESTER: ICD-10-CM

## 2023-04-06 DIAGNOSIS — M54.50 LOW BACK PAIN DURING PREGNANCY IN THIRD TRIMESTER: ICD-10-CM

## 2023-04-06 DIAGNOSIS — O09.93 HIGH-RISK PREGNANCY IN THIRD TRIMESTER: ICD-10-CM

## 2023-04-06 DIAGNOSIS — G89.29 CHRONIC BILATERAL LOW BACK PAIN WITHOUT SCIATICA: ICD-10-CM

## 2023-04-06 DIAGNOSIS — O99.210 OBESITY IN PREGNANCY, ANTEPARTUM: ICD-10-CM

## 2023-04-06 DIAGNOSIS — Z3A.38 38 WEEKS GESTATION OF PREGNANCY: Primary | ICD-10-CM

## 2023-04-06 PROCEDURE — 97140 MANUAL THERAPY 1/> REGIONS: CPT

## 2023-04-06 PROCEDURE — 97110 THERAPEUTIC EXERCISES: CPT

## 2023-04-06 PROCEDURE — 0502F SUBSEQUENT PRENATAL CARE: CPT | Performed by: NURSE PRACTITIONER

## 2023-04-06 RX ORDER — ACYCLOVIR 50 MG/G
OINTMENT TOPICAL
COMMUNITY
Start: 2023-04-05 | End: 2023-04-15 | Stop reason: HOSPADM

## 2023-04-06 NOTE — PROGRESS NOTES
CC: Prenatal visit    Sofiya Almaguer is a 33 y.o.  at 38w0d.  Doing well.  Denies N/V, dysuria, abnormal vaginal d/c, headaches, heartburn, constipation, cramping, regular contractions, LOF, or VB.  Reports good FM. Blood sugars continue to be well managed with diet.    Glucose log: 3/30-  Fastin-88 ( 0/7 elevated)  1hr PP:  (0/16 elevated)    /64   Wt 73 kg (161 lb)   LMP 2022 (Approximate)   BMI 30.42 kg/m²   SVE: Deferred  Fundal Height (cm): 37 cm  Fetal Heart Rate: 150       10/22 Problems (from 10/24/22 to present)     Problem Noted Resolved    Multigravida of advanced maternal age in third trimester 3/23/2023 by Aliya Simmons APRN No    Pelvic pain in pregnancy, antepartum, third trimester 3/9/2023 by Aliya Simmons APRN No    Overview Signed 3/9/2023 11:46 AM by Aliya Simmons APRN     Sees physical therapy         Diet controlled gestational diabetes mellitus (GDM) in third trimester 2023 by Aliya Simmons APRN No    Overview Addendum 3/23/2023 12:58 PM by Aliya Simmons APRN       IOL at 39 weeks if continues to be GDM diet controlled.          High-risk pregnancy in third trimester 2022 by Анна Andre DO No    Overview Addendum 2023  1:45 PM by Aliya Simmons APRN     Female fetus  BC?  Breastfeeding/ pump rx on 2023  Needs postpartup PAP         Obesity in pregnancy, antepartum 2022 by Aliya Simmons APRN No    Chronic bilateral low back pain without sciatica 2018 by Eva Jon DNP, LEEANN No    Overview Signed 3/9/2023 11:46 AM by Aliya Simmons APRN     Sees chiropractor and physical therayp               A/P: Sofiya Almaguer is a 33 y.o.  at 38w0d.  - Labor precautions  - IOL scheduled for 2023     Diagnosis Plan   1. 38 weeks gestation of pregnancy        2. Pelvic pain in pregnancy, antepartum, third trimester        3. Diet controlled gestational diabetes mellitus  (GDM) in third trimester        4. High-risk pregnancy in third trimester        5. Obesity in pregnancy, antepartum        6. Chronic bilateral low back pain without sciatica        7. Multigravida of advanced maternal age in third trimester          Aliya Simmons, APRN  4/6/2023  13:48 CDT

## 2023-04-06 NOTE — THERAPY TREATMENT NOTE
"    Outpatient Physical Therapy Pelvic Health Treatment Note  [facility]     Patient Name: Sofiya Almaguer  : 1990  MRN: [id number]  Today's Date: 2023   Visits:    Patient reports N/A% of improvement.  Next MD appt: 2023  Recertification: 2023       Therapy Diagnosis: LBP in 3rd trimester of pregnancy with myofascial restrictions     Visit Date: 2023    Visit Dx:    ICD-10-CM ICD-9-CM   1. Third trimester pregnancy  Z34.93 V22.2   2. Low back pain during pregnancy in third trimester  O26.893 646.80    M54.50 724.2       Patient Active Problem List   Diagnosis   • Acne   • Vitamin D deficiency   • Chronic bilateral low back pain without sciatica   • Hives   • Dermatitis   • Left otitis media   • Obesity in pregnancy, antepartum   • High-risk pregnancy in third trimester   • Diet controlled gestational diabetes mellitus (GDM) in third trimester   • Pelvic pain in pregnancy, antepartum, third trimester   • Multigravida of advanced maternal age in third trimester         Pelvic Health     Row Name 23 1100             Pregnancy Questions    Number of Pregnancies 6  -AC      Number of Miscarriages 0  -AC      Has the patient had an ? No  -AC      Number of Children 5  -AC      Type of Previous Deliveries Vaginal;Other (comment)  -AC      Due Date 23  -AC      Do you have radicular pain or numbness? No  -AC      Are you currently exercising? Unspecified  -AC      Pregnancy Comments 2 episiotomies  -AC            User Key  (r) = Recorded By, (t) = Taken By, (c) = Cosigned By    Initials Name Provider Type    Zelda De Santiago PT Physical Therapist               PT Ortho     Row Name 23 1100       Precautions and Contraindications    Precautions 3rd trimester of gestation  -AC    Contraindications no supine position for extended amount of time  -AC       Subjective Pain    Post-Treatment Pain Level 0  \"feels better\"  -AC          User Key  (r) = Recorded " Impression: Chronic angle-closure glaucoma, right eye, severe stage Plan: IOP stable today. Continue Latanoprost QHS OU, Timolol BID OU, Simbrinza TID OU.  RTC in 4 months for VF 24-2/RNFL/ IOP check with Dr. Jaswinder Heath "By, (t) = Taken By, (c) = Cosigned By    Initials Name Provider Type     Zelda Oneal, PT Physical Therapist                            PT Assessment/Plan     Row Name 04/06/23 1100          PT Assessment    Assessment Comments Good recall of all therEx and HEP. Good compliance with HEP. Patient able to tolerate deep squat position well with no increase in pain or difficulty. TA contractions in seated performed this visit with patient requiring cues for proper technique. No pain noted after improved sit to stand technqiue with included breath support. Manual therapy performed for pain relief and relaxation purposes. No taut bands noted.  -AC     Rehab Potential Good  -AC     Patient/caregiver participated in establishment of treatment plan and goals Yes  -AC     Patient would benefit from skilled therapy intervention Yes  -AC        PT Plan    PT Frequency 2x/week  -AC     PT Plan Comments Next visit continue downtraining of PF. Attempt biofeedback. Continue core stability.  -AC           User Key  (r) = Recorded By, (t) = Taken By, (c) = Cosigned By    Initials Name Provider Type     Zelda Oneal, PT Physical Therapist                   OP Exercises     Row Name 04/06/23 1100             Subjective Comments    Subjective Comments Patient reports tape has been helping. Patient reports she felt good after last visit. Patient reports she might go into work today. Reports no new issues since yesterday. Reports some irritation with tape over her L ribs. Reports she cut some of the tape off and it seemed to help. Reports she attempted deep squats and felt like she had to go pee but no pain.  -AC         Subjective Pain    Able to rate subjective pain? yes  -AC      Pre-Treatment Pain Level 0  just discomfort in low back  -AC      Post-Treatment Pain Level 0  \"feels better\"  -AC         Exercise 1    Exercise Name 1 B standing HS stretch  -AC      Reps 1 2  -AC      Time 1 30 sec hold  -AC         Exercise 2 "    Exercise Name 2 B standing ADD stretch  -AC      Reps 2 2  -AC      Time 2 30 sec hold  -AC         Exercise 3    Exercise Name 3 B piriformis stretch  -AC      Reps 3 2  -AC      Time 3 30 sec hold  -AC      Additional Comments standing  -AC         Exercise 4    Exercise Name 4 QP pelvic tilts  -AC      Sets 4 1  -AC      Reps 4 20  -AC         Exercise 5    Exercise Name 5 QP lateral tilts  -AC      Sets 5 1  -AC      Reps 5 20  -AC         Exercise 6    Exercise Name 6 Deep Squat  -AC      Reps 6 3  -AC      Time 6 30 sec hold  -AC         Exercise 7    Exercise Name 7 Manual therapy  -AC      Additional Comments see flowsheet  -AC         Exercise 8    Exercise Name 8 Seated Diaphragmatic breathing  -AC      Time 8 2 minutes  -AC      Additional Comments Tband around ribs for tactile cue - blue tband  -AC         Exercise 9    Exercise Name 9 Seated TA contractions  -AC      Sets 9 2  -AC      Reps 9 10  -AC      Time 9 3 sec hold  -AC         Exercise 10    Exercise Name 10 sit to/from stand with TA contraction  -AC      Sets 10 1  -AC      Reps 10 10  -AC            User Key  (r) = Recorded By, (t) = Taken By, (c) = Cosigned By    Initials Name Provider Type    AC Zelda Oneal, PT Physical Therapist              Manual Rx (last 36 hours)     Manual Treatments     Row Name 04/06/23 1100             Manual Rx 1    Manual Rx 1 Location B erector spinae  -AC      Manual Rx 1 Type STM with pelvic tilts for elongating tissues  -AC      Manual Rx 1 Duration 4 minutes  -AC         Manual Rx 2    Manual Rx 2 Location B QL  -AC      Manual Rx 2 Type STM  -AC      Manual Rx 2 Duration 6 minutes  -AC            User Key  (r) = Recorded By, (t) = Taken By, (c) = Cosigned By    Initials Name Provider Type    AC Zelda Oneal PT Physical Therapist                           PT OP Goals     Row Name 04/06/23 1100          PT Short Term Goals    STG Date to Achieve 04/20/23  -AC     STG 1 Patient to be I with HEP  with additions/changes prior to next recertification.  -     STG 1 Progress Met;Ongoing  -     STG 2 Patient to demo proper diaphragmatic breathing x 3 minutes with good form and no cues from PT.  -     STG 2 Progress Met  -     STG 3 Patient to perform 20 repetitions of TrA contractions in various positions with good form and no cues from PT.  -     STG 4 Patient to demo proper activation of B multifidi in various functional positions with no cues from PT.  -        Long Term Goals    LTG Date to Achieve 04/20/23  -     LTG 1 Patient to perform 10 sit to/from stands with proper TA contraction with no difficulty or pain noted.  -     LTG 2 No TTP of L glute medius.  -     LTG 2 Progress Ongoing;Progressing  -     LTG 3 No TTP of L pifiromis.  -     LTG 3 Progress Ongoing;Progressing  -     LTG 4 Patient to peform 10 B SLR with proper deep core activation and breath support without cues from PT.  -     LTG 5 B LE strength 5/5.  -     LTG 6 Patient to be I with symptom/pain management in order to prevent reoccurrence of symptoms and resume normal caregiver tasks.  -        Time Calculation    PT Goal Re-Cert Due Date 04/20/23  -           User Key  (r) = Recorded By, (t) = Taken By, (c) = Cosigned By    Initials Name Provider Type     Zelda Oneal PT Physical Therapist                 Therapy Education  Education Details: added deep squat to HEP  Given: HEP  Program: Progressed  How Provided: Verbal, Demonstration, Written  Provided to: Patient  Level of Understanding: Verbalized, Demonstrated              Time Calculation:   Start Time: 1116  Stop Time: 1210  Time Calculation (min): 54 min  Total Timed Code Minutes- PT: 53 minute(s)  Therapy Charges for Today     Code Description Service Date Service Provider Modifiers Qty    60029189859 HC PT THER SUPP EA 15 MIN 4/6/2023 Zelda Oneal PT GP 1    31107699958 HC PT THER PROC EA 15 MIN 4/6/2023 Zelda Oneal, PT GP 3     [id number]  PT MANUAL THERAPY EA 15 MIN 4/6/2023 Kimmy, Zelda, PT GP 1                    Autumn SONAM Oneal , DPT 4/6/2023

## 2023-04-10 ENCOUNTER — APPOINTMENT (OUTPATIENT)
Dept: PHYSICAL THERAPY | Facility: HOSPITAL | Age: 33
End: 2023-04-10
Payer: COMMERCIAL

## 2023-04-11 ENCOUNTER — HOSPITAL ENCOUNTER (OUTPATIENT)
Dept: PHYSICAL THERAPY | Facility: HOSPITAL | Age: 33
Setting detail: THERAPIES SERIES
Discharge: HOME | End: 2023-04-11
Payer: COMMERCIAL

## 2023-04-11 DIAGNOSIS — O26.893 LOW BACK PAIN DURING PREGNANCY IN THIRD TRIMESTER: ICD-10-CM

## 2023-04-11 DIAGNOSIS — Z34.93 THIRD TRIMESTER PREGNANCY: Primary | ICD-10-CM

## 2023-04-11 DIAGNOSIS — M54.50 LOW BACK PAIN DURING PREGNANCY IN THIRD TRIMESTER: ICD-10-CM

## 2023-04-11 PROCEDURE — 97535 SELF CARE MNGMENT TRAINING: CPT

## 2023-04-11 PROCEDURE — 97110 THERAPEUTIC EXERCISES: CPT

## 2023-04-11 PROCEDURE — 97140 MANUAL THERAPY 1/> REGIONS: CPT

## 2023-04-11 NOTE — THERAPY DISCHARGE NOTE
Outpatient Physical Therapy Pelvic Health Treatment Note/Discharge Summary  [facility]     Patient Name: Sofiya Almaguer  : 1990  MRN: [id number]  Today's Date: 2023   Visits: 8   Patient reports N/A% of improvement.  Next MD appt: 2023  Recertification: N/A      Therapy Diagnosis: LBP in 3rd trimester of pregnancy with myofascial restrictions        Visit Date: 2023    Visit Dx:    ICD-10-CM ICD-9-CM   1. Third trimester pregnancy  Z34.93 V22.2   2. Low back pain during pregnancy in third trimester  O26.893 646.80    M54.50 724.2       Patient Active Problem List   Diagnosis   • Acne   • Vitamin D deficiency   • Chronic bilateral low back pain without sciatica   • Hives   • Dermatitis   • Left otitis media   • Obesity in pregnancy, antepartum   • High-risk pregnancy in third trimester   • Diet controlled gestational diabetes mellitus (GDM) in third trimester   • Pelvic pain in pregnancy, antepartum, third trimester   • Multigravida of advanced maternal age in third trimester         Pelvic Health     Row Name 23 1100             Subjective Comments    Subjective Comments Patient reports she twisted her leg wrong or something because her L hip has been killing her. Patient reports the pain came on  evening. Patient rpeorts she had to help cook a lot on . Reports she can not find a comfortable position. Patient  -AC         Pregnancy Questions    Number of Pregnancies 6  -AC      Number of Miscarriages 0  -AC      Has the patient had an ? No  -AC      Number of Children 5  -AC      Type of Previous Deliveries Vaginal;Other (comment)  -AC      Due Date 23  inducing her  -AC      Do you have radicular pain or numbness? No  -AC      Are you currently exercising? Unspecified  -AC      Pregnancy Comments 2 episiotomies  -AC         Pain Assessment    Pain Assessment 0-10  -AC      Pain Score 6  -AC      Pain Location Buttocks  L side  -AC       Pain Descriptors Shooting;Radiating  -AC            User Key  (r) = Recorded By, (t) = Taken By, (c) = Cosigned By    Initials Name Provider Type    Zelda De Santiago PT Physical Therapist               PT Ortho     Row Name 04/11/23 1100       Precautions and Contraindications    Precautions 3rd trimester of gestation  -AC    Contraindications no supine position for extended amount of time  -AC       Subjective Pain    Post-Treatment Pain Level 4  -AC       Posture/Observations    Posture/Observations Comments Patient demo R anterior rotated innominate. R medial malleoli appears longer in standing position. L ASIS slightly elevated.  -AC       Lumbosacral Accessory Motions    Lumbosacral Accessory Motions Tested? Yes  -AC    PA [place]- L5 WNL  -AC    PA glide- Sacral base WNL  -AC    Innominate inflare WNL  -AC    Innominate outflare WNL  -AC       Lumbosacral Palpation    Piriformis Left:;Tender  -AC    Gluteus Melo Left:;Tender  -AC    Lumbosacral Palpation Comments TTP of L glute med  -AC          User Key  (r) = Recorded By, (t) = Taken By, (c) = Cosigned By    Initials Name Provider Type    Zelda De Santiago PT Physical Therapist                            PT Assessment/Plan     Row Name 04/11/23 1100          PT Assessment    Assessment Comments Patient will be discharged at this time due to patient being induced into labor on Friday April 14, 2023. Patient and PT reviewed final HEP this visit with patient verbalized understanding. Patient demo increased TTP of L glute med and L piriformis. STM/MFR performed on patient with slight reduction of symptoms. PT ed patient to wear SI belt over the next 3 days to help improve stability of pelvis. Pt verbalized understanding.  -AC     Rehab Potential Good  -AC     Patient/caregiver participated in establishment of treatment plan and goals Yes  -AC     Patient would benefit from skilled therapy intervention Yes  -AC        PT Plan    PT Frequency Other  (comment)  DC  -AC     Predicted Duration of Therapy Intervention (PT) DC  -AC     PT Plan Comments N/A  -AC           User Key  (r) = Recorded By, (t) = Taken By, (c) = Cosigned By    Initials Name Provider Type    AC Zelda Oneal, PT Physical Therapist                     OP Exercises     Row Name 04/11/23 1100             Subjective Comments    Subjective Comments Patient reports she twisted her leg wrong or something because her L hip has been killing her. Patient reports the pain came on Sunday evening. Patient rpeorts she had to help cook a lot on Sunday. Reports she can not find a comfortable position. Patient  -AC         Subjective Pain    Able to rate subjective pain? yes  -AC      Pre-Treatment Pain Level 6  -AC      Post-Treatment Pain Level 4  -AC         Exercise 1    Exercise Name 1 Manual therapy  -AC      Additional Comments see flowsheet  -AC         Exercise 2    Exercise Name 2 Happy baby pose  -AC      Reps 2 2  -AC      Time 2 30 sec hold  -AC         Exercise 3    Exercise Name 3 Sidelying SKTC  -AC      Reps 3 2  -AC      Time 3 30 sec hold  -AC         Exercise 4    Exercise Name 4 PF drop education  -AC      Cueing 4 Verbal;Tactile  -AC      Time 4 5 minutes  -AC      Additional Comments Patient performed 10 repetitions of PF drop.  -AC         Exercise 5    Exercise Name 5 QP pelvic tilts  -AC      Sets 5 1  -AC      Reps 5 10  -AC         Exercise 6    Exercise Name 6 QP lateral tilts  -AC      Sets 6 1  -AC      Reps 6 10  -AC         Exercise 7    Exercise Name 7 Patient education  -AC      Additional Comments see flowsheet  -AC         Exercise 8    Exercise Name 8 Review of final HEP  -AC            User Key  (r) = Recorded By, (t) = Taken By, (c) = Cosigned By    Initials Name Provider Type    Zelda De Santiago, PT Physical Therapist              Manual Rx (last 36 hours)     Manual Treatments     Row Name 04/11/23 1100             Manual Rx 1    Manual Rx 1 Location B QL  -AC       Manual Rx 1 Type STM  -AC      Manual Rx 1 Duration 5 minutes  -AC         Manual Rx 2    Manual Rx 2 Location L glute med  -AC      Manual Rx 2 Type STM/MFR  -AC      Manual Rx 2 Duration 10 minutes  -         Manual Rx 3    Manual Rx 3 Location L pirifromis  -AC      Manual Rx 3 Type STM/MFR  -AC      Manual Rx 3 Duration 8 minutes  -AC            User Key  (r) = Recorded By, (t) = Taken By, (c) = Cosigned By    Initials Name Provider Type     OnealZelda, PT Physical Therapist                           PT OP Goals     Row Name 04/11/23 1200 04/11/23 1100       PT Short Term Goals    STG Date to Achieve -- 04/20/23  -    STG 1 -- Patient to be I with HEP with additions/changes prior to next recertification.  -    STG 1 Progress -- Met;Ongoing  -    STG 2 -- Patient to demo proper diaphragmatic breathing x 3 minutes with good form and no cues from PT.  -    STG 2 Progress -- Met  -    STG 3 -- Patient to perform 20 repetitions of TrA contractions in various positions with good form and no cues from PT.  -    STG 3 Progress -- Not Met  -    STG 4 -- Patient to demo proper activation of B multifidi in various functional positions with no cues from PT.  -    STG 4 Progress -- Not Met  -       Long Term Goals    LTG Date to Achieve -- 04/20/23  -    LTG 1 -- Patient to perform 10 sit to/from stands with proper TA contraction with no difficulty or pain noted.  -    LTG 1 Progress -- Partially Met  -    LTG 2 -- No TTP of L glute medius.  -    LTG 2 Progress -- Not Met  -    LTG 3 -- No TTP of L pifiromis.  -    LTG 3 Progress -- Not Met  -    LTG 4 -- Patient to peform 10 B SLR with proper deep core activation and breath support without cues from PT.  -    LTG 4 Progress -- Not Met  -    LTG 5 -- B LE strength 5/5.  -    LTG 5 Progress -- Not Met  -    LTG 6 -- Patient to be I with symptom/pain management in order to prevent reoccurrence of symptoms and resume normal  caregiver tasks.  -AC    LTG 6 Progress -- Met  -AC       Time Calculation    PT Goal Re-Cert Due Date --  DC  -AC --          User Key  (r) = Recorded By, (t) = Taken By, (c) = Cosigned By    Initials Name Provider Type    AC Zelda Oneal, PT Physical Therapist                 Therapy Education  Education Details: Final HEP review; ed on proper breath support while in labor, good positions to prepare for delivery; ed on PF drop; heat for pain relief and ed to wear SI belt leading up to induction  Given: HEP, Symptoms/condition management, Pain management  Program: New, Progressed, Modified  How Provided: Verbal, Demonstration, Written  Provided to: Patient  Level of Understanding: Verbalized, Demonstrated  34836 - PT Self Care/Mgmt Minutes: 25            Time Calculation:   Start Time: 1118  Stop Time: 1213  Time Calculation (min): 55 min  Total Timed Code Minutes- PT: 55 minute(s)  Timed Charges  96911 - PT Self Care/Mgmt Minutes: 25  Total Minutes  Timed Charges Total Minutes: 25   Total Minutes: 25    Therapy Charges for Today     Code Description Service Date Service Provider Modifiers Qty    84726671443 HC PT SELF CARE/MGMT/TRAIN EA 15 MIN 4/11/2023 Zelda Oneal, PT GP 2    34078679894 HC PT THER PROC EA 15 MIN 4/11/2023 Zelda Oneal, PT GP 1    66910151798 HC PT MANUAL THERAPY EA 15 MIN 4/11/2023 OnealZelda, PT GP 1    28909950201 HC PT THER SUPP EA 15 MIN 4/11/2023 OnealZelda, PT GP 1               OP PT Discharge Summary  Date of Discharge: 04/11/23  Reason for Discharge: Patient/Caregiver request, Transfer to other facility/level of care  Outcomes Achieved: Patient able to partially acheive established goals, Refer to plan of care for updates on goals achieved  Discharge Destination: Home with home program  Discharge Instructions/Additional Comments: Patient will be induced into labor on Friday April 14, 2023. Patient will be discharged at this time with final HEP to continue to  perform prior to L&D.      Zelda Oneal, PT , DPT 4/11/2023

## 2023-04-12 ENCOUNTER — ROUTINE PRENATAL (OUTPATIENT)
Dept: OBSTETRICS AND GYNECOLOGY | Facility: CLINIC | Age: 33
End: 2023-04-12
Payer: COMMERCIAL

## 2023-04-12 VITALS — SYSTOLIC BLOOD PRESSURE: 118 MMHG | DIASTOLIC BLOOD PRESSURE: 72 MMHG | BODY MASS INDEX: 30.99 KG/M2 | WEIGHT: 164 LBS

## 2023-04-12 DIAGNOSIS — O26.893 PELVIC PAIN IN PREGNANCY, ANTEPARTUM, THIRD TRIMESTER: ICD-10-CM

## 2023-04-12 DIAGNOSIS — O09.523 MULTIGRAVIDA OF ADVANCED MATERNAL AGE IN THIRD TRIMESTER: ICD-10-CM

## 2023-04-12 DIAGNOSIS — O09.93 HIGH-RISK PREGNANCY IN THIRD TRIMESTER: ICD-10-CM

## 2023-04-12 DIAGNOSIS — O99.210 OBESITY IN PREGNANCY, ANTEPARTUM: ICD-10-CM

## 2023-04-12 DIAGNOSIS — M54.50 CHRONIC BILATERAL LOW BACK PAIN WITHOUT SCIATICA: ICD-10-CM

## 2023-04-12 DIAGNOSIS — R10.2 PELVIC PAIN IN PREGNANCY, ANTEPARTUM, THIRD TRIMESTER: ICD-10-CM

## 2023-04-12 DIAGNOSIS — G89.29 CHRONIC BILATERAL LOW BACK PAIN WITHOUT SCIATICA: ICD-10-CM

## 2023-04-12 DIAGNOSIS — O24.410 DIET CONTROLLED GESTATIONAL DIABETES MELLITUS (GDM) IN THIRD TRIMESTER: Primary | ICD-10-CM

## 2023-04-12 RX ORDER — ACETAMINOPHEN 325 MG/1
1000 TABLET ORAL EVERY 6 HOURS
Status: CANCELLED | OUTPATIENT
Start: 2023-04-12

## 2023-04-12 RX ORDER — SODIUM CHLORIDE 0.9 % (FLUSH) 0.9 %
3 SYRINGE (ML) INJECTION EVERY 12 HOURS SCHEDULED
Status: CANCELLED | OUTPATIENT
Start: 2023-04-12

## 2023-04-12 RX ORDER — ONDANSETRON 4 MG/1
4 TABLET, FILM COATED ORAL EVERY 6 HOURS PRN
Status: CANCELLED | OUTPATIENT
Start: 2023-04-12

## 2023-04-12 RX ORDER — OXYTOCIN 10 [USP'U]/ML
2-20 INJECTION, SOLUTION INTRAMUSCULAR; INTRAVENOUS
Status: CANCELLED | OUTPATIENT
Start: 2023-04-12

## 2023-04-12 RX ORDER — MISOPROSTOL 100 UG/1
800 TABLET ORAL AS NEEDED
Status: CANCELLED | OUTPATIENT
Start: 2023-04-12

## 2023-04-12 RX ORDER — SODIUM CHLORIDE 0.9 % (FLUSH) 0.9 %
3-10 SYRINGE (ML) INJECTION AS NEEDED
Status: CANCELLED | OUTPATIENT
Start: 2023-04-12

## 2023-04-12 RX ORDER — OXYTOCIN 10 [USP'U]/ML
250 INJECTION, SOLUTION INTRAMUSCULAR; INTRAVENOUS CONTINUOUS
Status: CANCELLED | OUTPATIENT
Start: 2023-04-12 | End: 2023-04-12

## 2023-04-12 RX ORDER — SODIUM CHLORIDE, SODIUM LACTATE, POTASSIUM CHLORIDE, CALCIUM CHLORIDE 600; 310; 30; 20 MG/100ML; MG/100ML; MG/100ML; MG/100ML
125 INJECTION, SOLUTION INTRAVENOUS CONTINUOUS
Status: CANCELLED | OUTPATIENT
Start: 2023-04-12

## 2023-04-12 RX ORDER — METHYLERGONOVINE MALEATE 0.2 MG/ML
200 INJECTION INTRAVENOUS ONCE AS NEEDED
Status: CANCELLED | OUTPATIENT
Start: 2023-04-12

## 2023-04-12 RX ORDER — BUTORPHANOL TARTRATE 1 MG/ML
1 INJECTION, SOLUTION INTRAMUSCULAR; INTRAVENOUS
Status: CANCELLED | OUTPATIENT
Start: 2023-04-12

## 2023-04-12 RX ORDER — PROMETHAZINE HYDROCHLORIDE 25 MG/1
25 TABLET ORAL EVERY 6 HOURS PRN
Status: CANCELLED | OUTPATIENT
Start: 2023-04-12

## 2023-04-12 RX ORDER — LIDOCAINE HYDROCHLORIDE 10 MG/ML
5 INJECTION, SOLUTION EPIDURAL; INFILTRATION; INTRACAUDAL; PERINEURAL AS NEEDED
Status: CANCELLED | OUTPATIENT
Start: 2023-04-12

## 2023-04-12 RX ORDER — IBUPROFEN 200 MG
800 TABLET ORAL EVERY 8 HOURS
Status: CANCELLED | OUTPATIENT
Start: 2023-04-12

## 2023-04-12 RX ORDER — ONDANSETRON 2 MG/ML
4 INJECTION INTRAMUSCULAR; INTRAVENOUS EVERY 6 HOURS PRN
Status: CANCELLED | OUTPATIENT
Start: 2023-04-12

## 2023-04-12 RX ORDER — CARBOPROST TROMETHAMINE 250 UG/ML
250 INJECTION, SOLUTION INTRAMUSCULAR AS NEEDED
Status: CANCELLED | OUTPATIENT
Start: 2023-04-12

## 2023-04-12 RX ORDER — BUTORPHANOL TARTRATE 1 MG/ML
2 INJECTION, SOLUTION INTRAMUSCULAR; INTRAVENOUS
Status: CANCELLED | OUTPATIENT
Start: 2023-04-12

## 2023-04-12 RX ORDER — PROMETHAZINE HYDROCHLORIDE 25 MG/1
12.5 SUPPOSITORY RECTAL EVERY 6 HOURS PRN
Status: CANCELLED | OUTPATIENT
Start: 2023-04-12

## 2023-04-12 RX ORDER — OXYTOCIN 10 [USP'U]/ML
999 INJECTION, SOLUTION INTRAMUSCULAR; INTRAVENOUS ONCE
Status: CANCELLED | OUTPATIENT
Start: 2023-04-12

## 2023-04-12 NOTE — H&P (VIEW-ONLY)
HISTORY & PHYSICAL - Obstetrics  [facility]    Name: Sofiya Almaguer  MRN: [id number]  Location: Room/bed info not found  Date: 2023  CSN: [id number]      CHIEF COMPLAINT:  IOL on  for GDMA1 at 39+1    HISTORY OF PRESENT ILLNESS  Sofiya Almaguer is a 33 y.o.  at 38w6d gestational age by 2nd TRI US suggesting Estimated Date of Delivery: 23.  The patient presents for routine PNC, scheduled IOL Friday. Nervous but ready.  Denies LOF.  Denies vaginal bleeding.  Having intermittent BH, but no regular painful contraction.  Good FM.    Patient denies any chest pain, palpitations, headaches, lightheadedness, shortness of breath, cough, nausea, vomiting, diarrhea, constipation, fever, or chills.    ROS  Review of Systems   Constitutional: Negative.    HENT: Negative.    Respiratory: Negative.    Cardiovascular: Negative.    Gastrointestinal: Negative.    Genitourinary: Negative.    Skin: Negative.    Psychiatric/Behavioral: Negative.        PRENATAL LAB RESULTS  Prenatal labs reviewed  External Prenatal Results     Pregnancy Outside Results - Transcribed From Office Records - See Scanned Records For Details     Test Value Date Time    ABO  A  10/28/22 1148    Rh  Positive  10/28/22 1148    Antibody Screen  Negative  10/28/22 1148    Varicella IgG       Rubella  4.12 index 10/28/22 1148    Hgb  11.3 g/dL 23 0832       13.0 g/dL 10/28/22 1148       12.8 g/dL 22 1451    Hct  33.0 % 23 0832       38.9 % 10/28/22 1148       37.5 % 22 1451    Glucose Fasting GTT  89 mg/dL 22 0850    Glucose Tolerance Test 1 hour  158 mg/dL 22 0952    Glucose Tolerance Test 3 hour  140 mg/dL 23 1321       114 mg/dL 22 1152    Gonorrhea (discrete)  Negative  10/28/22 1148    Chlamydia (discrete)  Negative  10/28/22 1148    RPR  Non-Reactive  10/28/22 1148    VDRL       Syphilis Antibody       HBsAg  Non-Reactive  10/28/22 1148    Herpes Simplex Virus  PCR       Herpes Simplex VIrus Culture       HIV  Non-Reactive  10/28/22 1148    Hep C RNA Quant PCR       Hep C Antibody  Non-Reactive  10/28/22 1148    AFP  43.9 ng/mL 09/01/16 1029    Group B Strep  Negative  03/23/23 1042    GBS Susceptibility to Clindamycin       GBS Susceptibility to Erythromycin       Fetal Fibronectin       Genetic Testing, Maternal Blood             Drug Screening     Test Value Date Time    Urine Drug Screen       Amphetamine Screen  Negative  10/28/22 1148    Barbiturate Screen  Negative  10/28/22 1148    Benzodiazepine Screen  Negative  10/28/22 1148    Methadone Screen  Negative  10/28/22 1148    Phencyclidine Screen  Negative  10/28/22 1148    Opiates Screen  Negative  10/28/22 1148    THC Screen  Negative  10/28/22 1148    Cocaine Screen       Propoxyphene Screen  Negative  10/28/22 1148    Buprenorphine Screen  Negative  10/28/22 1148    Methamphetamine Screen       Oxycodone Screen  Negative  10/28/22 1148    Tricyclic Antidepressants Screen  Negative  10/28/22 1148          Legend    ^: Historical                        PRENATAL RISK FACTORS  10/22 Problems (from 10/24/22 to present)     Problem Noted Resolved    Multigravida of advanced maternal age in third trimester 3/23/2023 by Aliya Simmons APRN No    Pelvic pain in pregnancy, antepartum, third trimester 3/9/2023 by Aliya Simmons APRN No    Overview Signed 3/9/2023 11:46 AM by Aliya Simmons APRN     Sees physical therapy         Diet controlled gestational diabetes mellitus (GDM) in third trimester 1/25/2023 by Aliya Simmons APRN No    Overview Addendum 3/23/2023 12:58 PM by Aliya Simmons APRN       IOL at 39 weeks if continues to be GDM diet controlled.          High-risk pregnancy in third trimester 12/19/2022 by нАна Andre, DO No    Overview Addendum 4/6/2023  1:45 PM by Aliya Simmons APRN     Female fetus  BC?  Breastfeeding/ pump rx on 1/25/2023  Needs postpartup PAP         Obesity  in pregnancy, antepartum 2022 by Aliya Simmons APRN No    Chronic bilateral low back pain without sciatica 2018 by Eva Jon, BIANCA, APRN No    Overview Signed 3/9/2023 11:46 AM by Aliya Simmons APRN     Sees chiropractor and physical therayp               DATING SUMMARY  LMP (23) -- ELKIN 23  2 TRI US (22 at 16w6d) -- ELKIN 23    OB HISTORY  OB History    Para Term  AB Living   6 5 5 0 0 5   SAB IAB Ectopic Molar Multiple Live Births   0 0 0   0 5      # Outcome Date GA Lbr Cem/2nd Weight Sex Delivery Anes PTL Lv   6 Current            5 Term 17 39w1d / 01:08 3771 g (8 lb 5 oz) M Vag-Forceps EPI N THEODORE      Birth Comments: TcB 6 at 24h   4 Term 2011 39w0d   M Vag-Vacuum EPI N THEODORE   3 Term 2009   3147 g (6 lb 15 oz) F Vag-Spont  N THEODORE   2 Term 2006   2693 g (5 lb 15 oz) F Vag-Spont  N THEODORE   1 Term 2005 39w2d   M Vag-Vacuum  N THEODORE     GYN HISTORY  Denies h/o sexually transmitted infections/pelvic inflammatory disease  Denies h/o abnormal pap smears, last pap was NIL 18, repeat pap PP  Denies h/o gynecologic surgeries, including biopsies of the cervix    PAST MEDICAL HISTORY  Past Medical History:   Diagnosis Date   • Abnormal liver function    • Abnormal Pap smear of cervix    • Abnormal weight gain    • Acne    • Cervical intraepithelial neoplasia grade 2     h/o   • Contraception    • Ear infection     bilateral   • Headache    • Hyperlipidemia    • Vitamin D deficiency      PAST SURGICAL HISTORY  Past Surgical History:   Procedure Laterality Date   • ADENOIDECTOMY     • CERVICAL BIOPSY  W/ LOOP ELECTRODE EXCISION     • INCISION AND DRAINAGE ABSCESS  2012    simple I&D   • LEEP  2007    LEEP cone biopsy. moderate cervical dysplasia   • OTHER SURGICAL HISTORY  2011    remove impacted cerumen   • TONSILLECTOMY     • WISDOM TOOTH EXTRACTION      only had 1 removed     FAMILY HISTORY  Family History   Problem Relation Age of  Onset   • Bipolar disorder Mother    • Depression Mother         depressive disorder   • Diabetes Mother    • Hyperlipidemia Mother    • Hypertension Mother    • Asthma Mother    • Breast cancer Other    • Colon cancer Other    • Diabetes Maternal Grandmother    • Hypertension Maternal Grandmother    • Fibromyalgia Maternal Grandmother    • Colon cancer Maternal Grandfather    • Diabetes Maternal Grandfather    • Alcohol abuse Father    • Cancer Sister    • Autism Brother    • Liver disease Paternal Grandmother    • No Known Problems Son    • No Known Problems Son    • No Known Problems Son    • No Known Problems Daughter    • No Known Problems Daughter      SOCIAL HISTORY  Social History     Socioeconomic History   • Marital status: Single   Tobacco Use   • Smoking status: Never   • Smokeless tobacco: Never   Vaping Use   • Vaping Use: Never used   Substance and Sexual Activity   • Alcohol use: No   • Drug use: Never   • Sexual activity: Defer     Partners: Male     ALLERGIES  No Known Allergies  HOME MEDICATIONS  Prior to Admission medications    Medication Sig Start Date End Date Taking? Authorizing Provider   Alcohol Swabs (Alcohol Prep) 70 % pads WIPE FINGER WITH ALCOHOL PREP PRIOR TO CHECKING BLOOD SUGAR 4 TIMES DAILY 1/26/23  Yes Aliya Simmons APRN   Blood Glucose Monitoring Suppl (OneTouch Verio Reflect) w/Device kit  1/26/23  Yes Dwight Dean MD   Continuous Blood Gluc  (Dexcom G6 ) device See Admin Instructions. 1/26/23  Yes Dwight Dean MD   Continuous Blood Gluc Sensor (Dexcom G6 Sensor) See Admin Instructions. 2/20/23  Yes Dwight Dena MD   Continuous Blood Gluc Transmit (Dexcom G6 Transmitter) misc See Admin Instructions. 1/26/23  Yes Dwight Dean MD   famotidine (Pepcid) 20 MG tablet Take 1 tablet by mouth Daily. 2/20/23 2/20/24 Yes Aliya Simmons APRN   glucose blood test strip CHECK BLOOD SUGARS 4 TIMES DAILY 1/26/23  Yes Troy  LEEANN Pisano   Prenatal Vit-Fe Fumarate-FA (PRENATAL PO) Take  by mouth.   Yes Provider, MD Dwight   glucose blood test strip CHECK BLOOD SUGARS 4 TIMES DAILY 23 Yes Aliya Simmons APRN   acyclovir (ZOVIRAX) 5 % ointment  23   Provider, MD Dwight   glucose monitor monitoring kit TEST BLOOD SUGARS 4 TIMES DAILY  Patient not taking: Reported on 2023   Aliya Simmons APRN   Lancets misc CHECK BLOOD SUGARS 4 TIMES DAILY  Patient not taking: Reported on 2023   Aliya Simmons APRN     PHYSICAL EXAM  /72   Wt 74.4 kg (164 lb)   LMP 2022 (Approximate)   BMI 30.99 kg/m²   General: No acute distress. Well developed, well nourished. Pleasant.  Heart: Regular rate and rhythm.   Lungs: No increased work of breathing  Abdomen: Soft, nontender to palpation, enlarged by gravid uterus.    NST Review  On admission    SVE: 2.5/70/-2    IMPRESSION  Sofiya Almaguer is a 33 y.o.  at 38w6d scheduled for IOL at 39+1 on  for GDMA1..    PLAN  1.  IUP at 39w1d on  with plan for IOL for GDMA1  - Admit: Labor and Delivery  - Attending: Dr. Amaral  - Condition: Stable  - Vitals: per protocol  - Activity: ad chiquis  - Nursing: Continuous electronic fetal monitoring, as per protocol  - Diet: Clears  - IV fluids:  mL/hr  - Meds: prn zofran, tylenol, stadol  - Allergies: NKDA  - Labs: CBC, T&S, UDS  - GBS: neg.  Antibiotics:  Not indicated  - Sofiya Almaguer and I have discussed pain goals for this hospitalization after reviewing her current clinical condition, medical history and prior pain experiences.  The goal is to keep her pain level manageable.  Patient does desire an epidural  - Plan for pitocin for favorable cervix in grand multip  - Anticipate     2.  GDMA1  - Plan for POC glucose q4h latent, q1h active labor        This document has been electronically signed by Анна Andre DO on 2023 09:45 CDT

## 2023-04-12 NOTE — PROGRESS NOTES
CC: Prenatal visit    Sofiya Almaguer is a 33 y.o.  at 38w6d.  Doing well.  Denies contractions, LOF, or VB.  Reports good FM.    Avg blood glucose over 30 days 100. States all WNL. Has been using DexCom but breaks her out so will likely not wear during labor.    /72   Wt 74.4 kg (164 lb)   LMP 2022 (Approximate)   BMI 30.99 kg/m²   SVE: 2.5/70/-2  Fundal Height (cm): 39 cm  Fetal Heart Rate: 155    A/P: Sofiya Almaguer is a 33 y.o.  at 38w6d.  - IOL scheduled Friday with Dr. Amaral  - Reviewed COVID-19 visitation policy  - Reviewed COVID-19 precautions    Problem List Items Addressed This Visit        Endocrine and Metabolic    Diet controlled gestational diabetes mellitus (GDM) in third trimester    Overview       IOL at 39 weeks if continues to be GDM diet controlled.             Gravid and     Obesity in pregnancy, antepartum    High-risk pregnancy in third trimester    Overview     Female fetus  BC?  Breastfeeding/ pump rx on 2023  Needs postpartup PAP         Pelvic pain in pregnancy, antepartum, third trimester - Primary    Overview     Sees physical therapy         Multigravida of advanced maternal age in third trimester       Musculoskeletal and Injuries    Chronic bilateral low back pain without sciatica    Overview     Sees chiropractor and physical therayp               Diagnosis Plan   1. Pelvic pain in pregnancy, antepartum, third trimester        2. Diet controlled gestational diabetes mellitus (GDM) in third trimester        3. High-risk pregnancy in third trimester        4. Obesity in pregnancy, antepartum        5. Chronic bilateral low back pain without sciatica        6. Multigravida of advanced maternal age in third trimester          Анна Andre DO  2023  09:43 CDT

## 2023-04-12 NOTE — H&P
HISTORY & PHYSICAL - Obstetrics  [facility]    Name: Sofiya Almaguer  MRN: [id number]  Location: Room/bed info not found  Date: 2023  CSN: [id number]      CHIEF COMPLAINT:  IOL on  for GDMA1 at 39+1    HISTORY OF PRESENT ILLNESS  Sofiya Almaguer is a 33 y.o.  at 38w6d gestational age by 2nd TRI US suggesting Estimated Date of Delivery: 23.  The patient presents for routine PNC, scheduled IOL Friday. Nervous but ready.  Denies LOF.  Denies vaginal bleeding.  Having intermittent BH, but no regular painful contraction.  Good FM.    Patient denies any chest pain, palpitations, headaches, lightheadedness, shortness of breath, cough, nausea, vomiting, diarrhea, constipation, fever, or chills.    ROS  Review of Systems   Constitutional: Negative.    HENT: Negative.    Respiratory: Negative.    Cardiovascular: Negative.    Gastrointestinal: Negative.    Genitourinary: Negative.    Skin: Negative.    Psychiatric/Behavioral: Negative.        PRENATAL LAB RESULTS  Prenatal labs reviewed  External Prenatal Results     Pregnancy Outside Results - Transcribed From Office Records - See Scanned Records For Details     Test Value Date Time    ABO  A  10/28/22 1148    Rh  Positive  10/28/22 1148    Antibody Screen  Negative  10/28/22 1148    Varicella IgG       Rubella  4.12 index 10/28/22 1148    Hgb  11.3 g/dL 23 0832       13.0 g/dL 10/28/22 1148       12.8 g/dL 22 1451    Hct  33.0 % 23 0832       38.9 % 10/28/22 1148       37.5 % 22 1451    Glucose Fasting GTT  89 mg/dL 22 0850    Glucose Tolerance Test 1 hour  158 mg/dL 22 0952    Glucose Tolerance Test 3 hour  140 mg/dL 23 1321       114 mg/dL 22 1152    Gonorrhea (discrete)  Negative  10/28/22 1148    Chlamydia (discrete)  Negative  10/28/22 1148    RPR  Non-Reactive  10/28/22 1148    VDRL       Syphilis Antibody       HBsAg  Non-Reactive  10/28/22 1148    Herpes Simplex Virus  PCR       Herpes Simplex VIrus Culture       HIV  Non-Reactive  10/28/22 1148    Hep C RNA Quant PCR       Hep C Antibody  Non-Reactive  10/28/22 1148    AFP  43.9 ng/mL 09/01/16 1029    Group B Strep  Negative  03/23/23 1042    GBS Susceptibility to Clindamycin       GBS Susceptibility to Erythromycin       Fetal Fibronectin       Genetic Testing, Maternal Blood             Drug Screening     Test Value Date Time    Urine Drug Screen       Amphetamine Screen  Negative  10/28/22 1148    Barbiturate Screen  Negative  10/28/22 1148    Benzodiazepine Screen  Negative  10/28/22 1148    Methadone Screen  Negative  10/28/22 1148    Phencyclidine Screen  Negative  10/28/22 1148    Opiates Screen  Negative  10/28/22 1148    THC Screen  Negative  10/28/22 1148    Cocaine Screen       Propoxyphene Screen  Negative  10/28/22 1148    Buprenorphine Screen  Negative  10/28/22 1148    Methamphetamine Screen       Oxycodone Screen  Negative  10/28/22 1148    Tricyclic Antidepressants Screen  Negative  10/28/22 1148          Legend    ^: Historical                        PRENATAL RISK FACTORS  10/22 Problems (from 10/24/22 to present)     Problem Noted Resolved    Multigravida of advanced maternal age in third trimester 3/23/2023 by Aliya Simmons APRN No    Pelvic pain in pregnancy, antepartum, third trimester 3/9/2023 by Aliya Simmons APRN No    Overview Signed 3/9/2023 11:46 AM by Aliya Simmons APRN     Sees physical therapy         Diet controlled gestational diabetes mellitus (GDM) in third trimester 1/25/2023 by Aliya Simmons APRN No    Overview Addendum 3/23/2023 12:58 PM by Aliya Simmons APRN       IOL at 39 weeks if continues to be GDM diet controlled.          High-risk pregnancy in third trimester 12/19/2022 by Анна Andre, DO No    Overview Addendum 4/6/2023  1:45 PM by Aliya Simmons APRN     Female fetus  BC?  Breastfeeding/ pump rx on 1/25/2023  Needs postpartup PAP         Obesity  in pregnancy, antepartum 2022 by Aliya Simmons APRN No    Chronic bilateral low back pain without sciatica 2018 by Eva Jon, BIANCA, APRN No    Overview Signed 3/9/2023 11:46 AM by Aliya Simmons APRN     Sees chiropractor and physical therayp               DATING SUMMARY  LMP (23) -- ELKIN 23  2 TRI US (22 at 16w6d) -- ELKIN 23    OB HISTORY  OB History    Para Term  AB Living   6 5 5 0 0 5   SAB IAB Ectopic Molar Multiple Live Births   0 0 0   0 5      # Outcome Date GA Lbr Cem/2nd Weight Sex Delivery Anes PTL Lv   6 Current            5 Term 17 39w1d / 01:08 3771 g (8 lb 5 oz) M Vag-Forceps EPI N THEODORE      Birth Comments: TcB 6 at 24h   4 Term 2011 39w0d   M Vag-Vacuum EPI N THEODORE   3 Term 2009   3147 g (6 lb 15 oz) F Vag-Spont  N THEODORE   2 Term 2006   2693 g (5 lb 15 oz) F Vag-Spont  N THEODORE   1 Term 2005 39w2d   M Vag-Vacuum  N THEODORE     GYN HISTORY  Denies h/o sexually transmitted infections/pelvic inflammatory disease  Denies h/o abnormal pap smears, last pap was NIL 16, repeat pap PP  Denies h/o gynecologic surgeries, including biopsies of the cervix    PAST MEDICAL HISTORY  Past Medical History:   Diagnosis Date   • Abnormal liver function    • Abnormal Pap smear of cervix    • Abnormal weight gain    • Acne    • Cervical intraepithelial neoplasia grade 2     h/o   • Contraception    • Ear infection     bilateral   • Headache    • Hyperlipidemia    • Vitamin D deficiency      PAST SURGICAL HISTORY  Past Surgical History:   Procedure Laterality Date   • ADENOIDECTOMY     • CERVICAL BIOPSY  W/ LOOP ELECTRODE EXCISION     • INCISION AND DRAINAGE ABSCESS  2012    simple I&D   • LEEP  2007    LEEP cone biopsy. moderate cervical dysplasia   • OTHER SURGICAL HISTORY  2011    remove impacted cerumen   • TONSILLECTOMY     • WISDOM TOOTH EXTRACTION      only had 1 removed     FAMILY HISTORY  Family History   Problem Relation Age of  Onset   • Bipolar disorder Mother    • Depression Mother         depressive disorder   • Diabetes Mother    • Hyperlipidemia Mother    • Hypertension Mother    • Asthma Mother    • Breast cancer Other    • Colon cancer Other    • Diabetes Maternal Grandmother    • Hypertension Maternal Grandmother    • Fibromyalgia Maternal Grandmother    • Colon cancer Maternal Grandfather    • Diabetes Maternal Grandfather    • Alcohol abuse Father    • Cancer Sister    • Autism Brother    • Liver disease Paternal Grandmother    • No Known Problems Son    • No Known Problems Son    • No Known Problems Son    • No Known Problems Daughter    • No Known Problems Daughter      SOCIAL HISTORY  Social History     Socioeconomic History   • Marital status: Single   Tobacco Use   • Smoking status: Never   • Smokeless tobacco: Never   Vaping Use   • Vaping Use: Never used   Substance and Sexual Activity   • Alcohol use: No   • Drug use: Never   • Sexual activity: Defer     Partners: Male     ALLERGIES  No Known Allergies  HOME MEDICATIONS  Prior to Admission medications    Medication Sig Start Date End Date Taking? Authorizing Provider   Alcohol Swabs (Alcohol Prep) 70 % pads WIPE FINGER WITH ALCOHOL PREP PRIOR TO CHECKING BLOOD SUGAR 4 TIMES DAILY 1/26/23  Yes Aliya Simmons APRN   Blood Glucose Monitoring Suppl (OneTouch Verio Reflect) w/Device kit  1/26/23  Yes Dwight Dean MD   Continuous Blood Gluc  (Dexcom G6 ) device See Admin Instructions. 1/26/23  Yes Dwight Dean MD   Continuous Blood Gluc Sensor (Dexcom G6 Sensor) See Admin Instructions. 2/20/23  Yes Dwight Dean MD   Continuous Blood Gluc Transmit (Dexcom G6 Transmitter) misc See Admin Instructions. 1/26/23  Yes Dwight Dean MD   famotidine (Pepcid) 20 MG tablet Take 1 tablet by mouth Daily. 2/20/23 2/20/24 Yes Aliya Simmons APRN   glucose blood test strip CHECK BLOOD SUGARS 4 TIMES DAILY 1/26/23  Yes Troy  LEEANN Pisano   Prenatal Vit-Fe Fumarate-FA (PRENATAL PO) Take  by mouth.   Yes Provider, MD Dwight   glucose blood test strip CHECK BLOOD SUGARS 4 TIMES DAILY 23 Yes Aliya Simmons APRN   acyclovir (ZOVIRAX) 5 % ointment  23   Provider, MD Dwight   glucose monitor monitoring kit TEST BLOOD SUGARS 4 TIMES DAILY  Patient not taking: Reported on 2023   Aliya Simmons APRN   Lancets misc CHECK BLOOD SUGARS 4 TIMES DAILY  Patient not taking: Reported on 2023   Aliya Simmons APRN     PHYSICAL EXAM  /72   Wt 74.4 kg (164 lb)   LMP 2022 (Approximate)   BMI 30.99 kg/m²   General: No acute distress. Well developed, well nourished. Pleasant.  Heart: Regular rate and rhythm.   Lungs: No increased work of breathing  Abdomen: Soft, nontender to palpation, enlarged by gravid uterus.    NST Review  On admission    SVE: 2.5/70/-2    IMPRESSION  Sofiya Almaguer is a 33 y.o.  at 38w6d scheduled for IOL at 39+1 on  for GDMA1..    PLAN  1.  IUP at 39w1d on  with plan for IOL for GDMA1  - Admit: Labor and Delivery  - Attending: Dr. Amaral  - Condition: Stable  - Vitals: per protocol  - Activity: ad chiquis  - Nursing: Continuous electronic fetal monitoring, as per protocol  - Diet: Clears  - IV fluids:  mL/hr  - Meds: prn zofran, tylenol, stadol  - Allergies: NKDA  - Labs: CBC, T&S, UDS  - GBS: neg.  Antibiotics:  Not indicated  - Sofiya Almaguer and I have discussed pain goals for this hospitalization after reviewing her current clinical condition, medical history and prior pain experiences.  The goal is to keep her pain level manageable.  Patient does desire an epidural  - Plan for pitocin for favorable cervix in grand multip  - Anticipate     2.  GDMA1  - Plan for POC glucose q4h latent, q1h active labor        This document has been electronically signed by Анна Andre DO on 2023 09:45 CDT

## 2023-04-14 ENCOUNTER — ANESTHESIA (OUTPATIENT)
Dept: LABOR AND DELIVERY | Facility: HOSPITAL | Age: 33
End: 2023-04-14
Payer: COMMERCIAL

## 2023-04-14 ENCOUNTER — HOSPITAL ENCOUNTER (INPATIENT)
Dept: LABOR AND DELIVERY | Facility: HOSPITAL | Age: 33
Discharge: HOME | End: 2023-04-14
Payer: COMMERCIAL

## 2023-04-14 ENCOUNTER — HOSPITAL ENCOUNTER (INPATIENT)
Facility: HOSPITAL | Age: 33
LOS: 1 days | Discharge: HOME | End: 2023-04-15
Attending: OBSTETRICS & GYNECOLOGY | Admitting: OBSTETRICS & GYNECOLOGY
Payer: COMMERCIAL

## 2023-04-14 ENCOUNTER — ANESTHESIA EVENT (OUTPATIENT)
Dept: LABOR AND DELIVERY | Facility: HOSPITAL | Age: 33
End: 2023-04-14
Payer: COMMERCIAL

## 2023-04-14 DIAGNOSIS — O24.410 DIET CONTROLLED GESTATIONAL DIABETES MELLITUS (GDM) IN THIRD TRIMESTER: ICD-10-CM

## 2023-04-14 PROBLEM — L50.9 HIVES: Status: RESOLVED | Noted: 2020-08-07 | Resolved: 2023-04-14

## 2023-04-14 PROBLEM — Z64.1 GRAND MULTIPARITY: Status: ACTIVE | Noted: 2023-04-14

## 2023-04-14 PROBLEM — H66.92 LEFT OTITIS MEDIA: Status: RESOLVED | Noted: 2021-05-06 | Resolved: 2023-04-14

## 2023-04-14 PROBLEM — O99.213 OBESITY AFFECTING PREGNANCY IN THIRD TRIMESTER: Status: ACTIVE | Noted: 2022-11-09

## 2023-04-14 PROBLEM — L30.9 DERMATITIS: Status: RESOLVED | Noted: 2020-08-07 | Resolved: 2023-04-14

## 2023-04-14 PROBLEM — Z34.90 ENCOUNTER FOR ELECTIVE INDUCTION OF LABOR: Status: ACTIVE | Noted: 2023-04-14

## 2023-04-14 PROBLEM — Z34.90 ENCOUNTER FOR ELECTIVE INDUCTION OF LABOR: Status: RESOLVED | Noted: 2023-04-14 | Resolved: 2023-04-14

## 2023-04-14 LAB
ABO GROUP BLD: NORMAL
AMPHET+METHAMPHET UR QL: NEGATIVE
AMPHETAMINES UR QL: NEGATIVE
BARBITURATES UR QL SCN: NEGATIVE
BENZODIAZ UR QL SCN: NEGATIVE
BLD GP AB SCN SERPL QL: NEGATIVE
BUPRENORPHINE SERPL-MCNC: NEGATIVE NG/ML
CANNABINOIDS SERPL QL: NEGATIVE
COCAINE UR QL: NEGATIVE
DEPRECATED RDW RBC AUTO: 39.7 FL (ref 37–54)
ERYTHROCYTE [DISTWIDTH] IN BLOOD BY AUTOMATED COUNT: 13.2 % (ref 12.3–15.4)
GLUCOSE BLDC GLUCOMTR-MCNC: 128 MG/DL (ref 70–130)
HCT VFR BLD AUTO: 36.4 % (ref 34–46.6)
HGB BLD-MCNC: 12.4 G/DL (ref 12–15.9)
Lab: NORMAL
MCH RBC QN AUTO: 28.4 PG (ref 26.6–33)
MCHC RBC AUTO-ENTMCNC: 34.1 G/DL (ref 31.5–35.7)
MCV RBC AUTO: 83.3 FL (ref 79–97)
METHADONE UR QL SCN: NEGATIVE
OPIATES UR QL: NEGATIVE
OXYCODONE UR QL SCN: NEGATIVE
PCP UR QL SCN: NEGATIVE
PLATELET # BLD AUTO: 190 10*3/MM3 (ref 140–450)
PMV BLD AUTO: 11.5 FL (ref 6–12)
PROPOXYPH UR QL: NEGATIVE
RBC # BLD AUTO: 4.37 10*6/MM3 (ref 3.77–5.28)
RH BLD: POSITIVE
T&S EXPIRATION DATE: NORMAL
TRICYCLICS UR QL SCN: NEGATIVE
WBC NRBC COR # BLD: 8.94 10*3/MM3 (ref 3.4–10.8)

## 2023-04-14 PROCEDURE — 86850 RBC ANTIBODY SCREEN: CPT | Performed by: OBSTETRICS & GYNECOLOGY

## 2023-04-14 PROCEDURE — 85027 COMPLETE CBC AUTOMATED: CPT | Performed by: OBSTETRICS & GYNECOLOGY

## 2023-04-14 PROCEDURE — C1755 CATHETER, INTRASPINAL: HCPCS

## 2023-04-14 PROCEDURE — 86901 BLOOD TYPING SEROLOGIC RH(D): CPT | Performed by: OBSTETRICS & GYNECOLOGY

## 2023-04-14 PROCEDURE — 82962 GLUCOSE BLOOD TEST: CPT

## 2023-04-14 PROCEDURE — 3E033VJ INTRODUCTION OF OTHER HORMONE INTO PERIPHERAL VEIN, PERCUTANEOUS APPROACH: ICD-10-PCS | Performed by: OBSTETRICS & GYNECOLOGY

## 2023-04-14 PROCEDURE — 25010000002 FENTANYL CITRATE (PF) 100 MCG/2ML SOLUTION: Performed by: NURSE ANESTHETIST, CERTIFIED REGISTERED

## 2023-04-14 PROCEDURE — 80306 DRUG TEST PRSMV INSTRMNT: CPT | Performed by: OBSTETRICS & GYNECOLOGY

## 2023-04-14 PROCEDURE — C1755 CATHETER, INTRASPINAL: HCPCS | Performed by: NURSE ANESTHETIST, CERTIFIED REGISTERED

## 2023-04-14 PROCEDURE — 0HQ9XZZ REPAIR PERINEUM SKIN, EXTERNAL APPROACH: ICD-10-PCS | Performed by: OBSTETRICS & GYNECOLOGY

## 2023-04-14 PROCEDURE — 86900 BLOOD TYPING SEROLOGIC ABO: CPT | Performed by: OBSTETRICS & GYNECOLOGY

## 2023-04-14 PROCEDURE — 59400 OBSTETRICAL CARE: CPT | Performed by: OBSTETRICS & GYNECOLOGY

## 2023-04-14 RX ORDER — SODIUM CHLORIDE 0.9 % (FLUSH) 0.9 %
3 SYRINGE (ML) INJECTION EVERY 12 HOURS SCHEDULED
Status: DISCONTINUED | OUTPATIENT
Start: 2023-04-14 | End: 2023-04-14 | Stop reason: HOSPADM

## 2023-04-14 RX ORDER — ACETAMINOPHEN 325 MG/1
650 TABLET ORAL EVERY 6 HOURS
Status: DISCONTINUED | OUTPATIENT
Start: 2023-04-14 | End: 2023-04-15 | Stop reason: HOSPADM

## 2023-04-14 RX ORDER — HYDROCORTISONE 25 MG/G
1 CREAM TOPICAL 2 TIMES DAILY PRN
Status: DISCONTINUED | OUTPATIENT
Start: 2023-04-14 | End: 2023-04-15 | Stop reason: HOSPADM

## 2023-04-14 RX ORDER — OXYTOCIN/0.9 % SODIUM CHLORIDE 30/500 ML
2-20 PLASTIC BAG, INJECTION (ML) INTRAVENOUS
Status: DISCONTINUED | OUTPATIENT
Start: 2023-04-14 | End: 2023-04-14 | Stop reason: HOSPADM

## 2023-04-14 RX ORDER — METHYLERGONOVINE MALEATE 0.2 MG/ML
200 INJECTION INTRAVENOUS ONCE AS NEEDED
Status: DISCONTINUED | OUTPATIENT
Start: 2023-04-14 | End: 2023-04-14 | Stop reason: HOSPADM

## 2023-04-14 RX ORDER — EPHEDRINE SULFATE 50 MG/ML
5 INJECTION, SOLUTION INTRAVENOUS
Status: DISCONTINUED | OUTPATIENT
Start: 2023-04-14 | End: 2023-04-14 | Stop reason: HOSPADM

## 2023-04-14 RX ORDER — ONDANSETRON 2 MG/ML
4 INJECTION INTRAMUSCULAR; INTRAVENOUS ONCE AS NEEDED
Status: DISCONTINUED | OUTPATIENT
Start: 2023-04-14 | End: 2023-04-14 | Stop reason: HOSPADM

## 2023-04-14 RX ORDER — OXYTOCIN/0.9 % SODIUM CHLORIDE 30/500 ML
999 PLASTIC BAG, INJECTION (ML) INTRAVENOUS ONCE
Status: DISCONTINUED | OUTPATIENT
Start: 2023-04-14 | End: 2023-04-14 | Stop reason: HOSPADM

## 2023-04-14 RX ORDER — BUPIVACAINE HYDROCHLORIDE 2.5 MG/ML
INJECTION, SOLUTION EPIDURAL; INFILTRATION; INTRACAUDAL AS NEEDED
Status: DISCONTINUED | OUTPATIENT
Start: 2023-04-14 | End: 2023-04-14 | Stop reason: SURG

## 2023-04-14 RX ORDER — CALCIUM CARBONATE 200(500)MG
2 TABLET,CHEWABLE ORAL 3 TIMES DAILY PRN
Status: DISCONTINUED | OUTPATIENT
Start: 2023-04-14 | End: 2023-04-15 | Stop reason: HOSPADM

## 2023-04-14 RX ORDER — PRENATAL VIT/IRON FUM/FOLIC AC 27MG-0.8MG
1 TABLET ORAL DAILY
Status: DISCONTINUED | OUTPATIENT
Start: 2023-04-14 | End: 2023-04-15 | Stop reason: HOSPADM

## 2023-04-14 RX ORDER — BUTORPHANOL TARTRATE 1 MG/ML
1 INJECTION, SOLUTION INTRAMUSCULAR; INTRAVENOUS
Status: DISCONTINUED | OUTPATIENT
Start: 2023-04-14 | End: 2023-04-14 | Stop reason: HOSPADM

## 2023-04-14 RX ORDER — PROMETHAZINE HYDROCHLORIDE 12.5 MG/1
12.5 SUPPOSITORY RECTAL EVERY 6 HOURS PRN
Status: DISCONTINUED | OUTPATIENT
Start: 2023-04-14 | End: 2023-04-14 | Stop reason: HOSPADM

## 2023-04-14 RX ORDER — MISOPROSTOL 200 UG/1
800 TABLET ORAL AS NEEDED
Status: DISCONTINUED | OUTPATIENT
Start: 2023-04-14 | End: 2023-04-14 | Stop reason: HOSPADM

## 2023-04-14 RX ORDER — BISACODYL 10 MG
10 SUPPOSITORY, RECTAL RECTAL DAILY PRN
Status: DISCONTINUED | OUTPATIENT
Start: 2023-04-15 | End: 2023-04-15 | Stop reason: HOSPADM

## 2023-04-14 RX ORDER — PROMETHAZINE HYDROCHLORIDE 25 MG/1
25 TABLET ORAL EVERY 6 HOURS PRN
Status: DISCONTINUED | OUTPATIENT
Start: 2023-04-14 | End: 2023-04-14 | Stop reason: HOSPADM

## 2023-04-14 RX ORDER — IBUPROFEN 600 MG/1
600 TABLET ORAL EVERY 6 HOURS SCHEDULED
Status: DISCONTINUED | OUTPATIENT
Start: 2023-04-14 | End: 2023-04-15 | Stop reason: HOSPADM

## 2023-04-14 RX ORDER — BUTORPHANOL TARTRATE 1 MG/ML
2 INJECTION, SOLUTION INTRAMUSCULAR; INTRAVENOUS
Status: DISCONTINUED | OUTPATIENT
Start: 2023-04-14 | End: 2023-04-14 | Stop reason: HOSPADM

## 2023-04-14 RX ORDER — IBUPROFEN 800 MG/1
800 TABLET ORAL EVERY 8 HOURS
Status: DISCONTINUED | OUTPATIENT
Start: 2023-04-14 | End: 2023-04-14 | Stop reason: HOSPADM

## 2023-04-14 RX ORDER — LIDOCAINE HYDROCHLORIDE 5 MG/ML
INJECTION, SOLUTION INFILTRATION; INTRAVENOUS AS NEEDED
Status: DISCONTINUED | OUTPATIENT
Start: 2023-04-14 | End: 2023-04-14 | Stop reason: SURG

## 2023-04-14 RX ORDER — ONDANSETRON 2 MG/ML
4 INJECTION INTRAMUSCULAR; INTRAVENOUS EVERY 6 HOURS PRN
Status: DISCONTINUED | OUTPATIENT
Start: 2023-04-14 | End: 2023-04-15 | Stop reason: HOSPADM

## 2023-04-14 RX ORDER — OXYTOCIN/0.9 % SODIUM CHLORIDE 30/500 ML
250 PLASTIC BAG, INJECTION (ML) INTRAVENOUS CONTINUOUS
Status: ACTIVE | OUTPATIENT
Start: 2023-04-14 | End: 2023-04-14

## 2023-04-14 RX ORDER — CARBOPROST TROMETHAMINE 250 UG/ML
250 INJECTION, SOLUTION INTRAMUSCULAR AS NEEDED
Status: DISCONTINUED | OUTPATIENT
Start: 2023-04-14 | End: 2023-04-14 | Stop reason: HOSPADM

## 2023-04-14 RX ORDER — SODIUM CHLORIDE 0.9 % (FLUSH) 0.9 %
3-10 SYRINGE (ML) INJECTION AS NEEDED
Status: DISCONTINUED | OUTPATIENT
Start: 2023-04-14 | End: 2023-04-14 | Stop reason: HOSPADM

## 2023-04-14 RX ORDER — DOCUSATE SODIUM 100 MG/1
100 CAPSULE, LIQUID FILLED ORAL 2 TIMES DAILY
Status: DISCONTINUED | OUTPATIENT
Start: 2023-04-14 | End: 2023-04-15 | Stop reason: HOSPADM

## 2023-04-14 RX ORDER — ONDANSETRON 4 MG/1
4 TABLET, FILM COATED ORAL EVERY 6 HOURS PRN
Status: DISCONTINUED | OUTPATIENT
Start: 2023-04-14 | End: 2023-04-15 | Stop reason: HOSPADM

## 2023-04-14 RX ORDER — LIDOCAINE HYDROCHLORIDE 10 MG/ML
5 INJECTION, SOLUTION EPIDURAL; INFILTRATION; INTRACAUDAL; PERINEURAL AS NEEDED
Status: DISCONTINUED | OUTPATIENT
Start: 2023-04-14 | End: 2023-04-14 | Stop reason: HOSPADM

## 2023-04-14 RX ORDER — SODIUM CHLORIDE 0.9 % (FLUSH) 0.9 %
1-10 SYRINGE (ML) INJECTION AS NEEDED
Status: DISCONTINUED | OUTPATIENT
Start: 2023-04-14 | End: 2023-04-15 | Stop reason: HOSPADM

## 2023-04-14 RX ORDER — ONDANSETRON 2 MG/ML
4 INJECTION INTRAMUSCULAR; INTRAVENOUS EVERY 6 HOURS PRN
Status: DISCONTINUED | OUTPATIENT
Start: 2023-04-14 | End: 2023-04-14 | Stop reason: HOSPADM

## 2023-04-14 RX ORDER — DIPHENHYDRAMINE HYDROCHLORIDE 50 MG/ML
12.5 INJECTION INTRAMUSCULAR; INTRAVENOUS EVERY 8 HOURS PRN
Status: DISCONTINUED | OUTPATIENT
Start: 2023-04-14 | End: 2023-04-14 | Stop reason: HOSPADM

## 2023-04-14 RX ORDER — SODIUM CHLORIDE, SODIUM LACTATE, POTASSIUM CHLORIDE, CALCIUM CHLORIDE 600; 310; 30; 20 MG/100ML; MG/100ML; MG/100ML; MG/100ML
125 INJECTION, SOLUTION INTRAVENOUS CONTINUOUS
Status: DISCONTINUED | OUTPATIENT
Start: 2023-04-14 | End: 2023-04-14

## 2023-04-14 RX ORDER — ONDANSETRON 4 MG/1
4 TABLET, FILM COATED ORAL EVERY 6 HOURS PRN
Status: DISCONTINUED | OUTPATIENT
Start: 2023-04-14 | End: 2023-04-14 | Stop reason: HOSPADM

## 2023-04-14 RX ORDER — FENTANYL CITRATE 50 UG/ML
INJECTION, SOLUTION INTRAMUSCULAR; INTRAVENOUS AS NEEDED
Status: DISCONTINUED | OUTPATIENT
Start: 2023-04-14 | End: 2023-04-14 | Stop reason: SURG

## 2023-04-14 RX ORDER — FAMOTIDINE 10 MG/ML
20 INJECTION, SOLUTION INTRAVENOUS ONCE AS NEEDED
Status: DISCONTINUED | OUTPATIENT
Start: 2023-04-14 | End: 2023-04-14 | Stop reason: HOSPADM

## 2023-04-14 RX ORDER — FERROUS SULFATE TAB EC 324 MG (65 MG FE EQUIVALENT) 324 (65 FE) MG
324 TABLET DELAYED RESPONSE ORAL 2 TIMES DAILY WITH MEALS
Status: DISCONTINUED | OUTPATIENT
Start: 2023-04-14 | End: 2023-04-15 | Stop reason: HOSPADM

## 2023-04-14 RX ORDER — ACETAMINOPHEN 500 MG
1000 TABLET ORAL EVERY 6 HOURS
Status: DISCONTINUED | OUTPATIENT
Start: 2023-04-14 | End: 2023-04-14 | Stop reason: HOSPADM

## 2023-04-14 RX ADMIN — Medication 1 TABLET: at 17:23

## 2023-04-14 RX ADMIN — BUPIVACAINE HYDROCHLORIDE 10 ML: 2.5 INJECTION, SOLUTION EPIDURAL; INFILTRATION; INTRACAUDAL; PERINEURAL at 12:35

## 2023-04-14 RX ADMIN — IBUPROFEN 600 MG: 600 TABLET, FILM COATED ORAL at 22:56

## 2023-04-14 RX ADMIN — FENTANYL CITRATE 50 MCG: 50 INJECTION, SOLUTION INTRAMUSCULAR; INTRAVENOUS at 13:28

## 2023-04-14 RX ADMIN — SODIUM CHLORIDE, POTASSIUM CHLORIDE, SODIUM LACTATE AND CALCIUM CHLORIDE 1000 ML: 600; 310; 30; 20 INJECTION, SOLUTION INTRAVENOUS at 12:05

## 2023-04-14 RX ADMIN — FERROUS SULFATE TAB EC 324 MG (65 MG FE EQUIVALENT) 324 MG: 324 (65 FE) TABLET DELAYED RESPONSE at 17:23

## 2023-04-14 RX ADMIN — OXYTOCIN-SODIUM CHLORIDE 0.9% IV SOLN 30 UNIT/500ML 2 MILLI-UNITS/MIN: 30-0.9/5 SOLUTION at 07:50

## 2023-04-14 RX ADMIN — SODIUM CHLORIDE, POTASSIUM CHLORIDE, SODIUM LACTATE AND CALCIUM CHLORIDE 125 ML/HR: 600; 310; 30; 20 INJECTION, SOLUTION INTRAVENOUS at 07:15

## 2023-04-14 RX ADMIN — LIDOCAINE HYDROCHLORIDE 5 ML: 5 INJECTION, SOLUTION INFILTRATION; INTRAVENOUS at 13:28

## 2023-04-14 NOTE — PLAN OF CARE
Goal Outcome Evaluation:  Plan of Care Reviewed With: patient        Progress: no change  Outcome Evaluation: inductions started with pitocin, pt 2-3cm.

## 2023-04-14 NOTE — PLAN OF CARE
Goal Outcome Evaluation:  Plan of Care Reviewed With: patient, significant other, family        Progress: improving  Outcome Evaluation: VSS, epidural placed at 1215 today, delivered baby girl at 1350, pt breastfeeding infant, FF, bleeding WNL

## 2023-04-14 NOTE — NURSING NOTE
Pt complaining about pain at IV site when moving. RN assessed and no redness or swelling noted, no signs of infiltration. Pt requested for another IV be attempted to change IV site. Nursing attempted new IV x2 with no success. Pt requested to keep current running IV and for no further attempts. Educated pt signs of infiltration to watch for.

## 2023-04-14 NOTE — ANESTHESIA PREPROCEDURE EVALUATION
Anesthesia Evaluation     NPO Solid Status: > 8 hours  NPO Liquid Status: > 4 hours           Airway   Mallampati: II  Dental - normal exam     Pulmonary - negative pulmonary ROS and normal exam     ROS comment: Last epidural poor relief and extremely difficult placement with paresthesias  Cardiovascular     Rhythm: regular  Rate: normal    (+) hyperlipidemia,       Neuro/Psych- negative ROS  GI/Hepatic/Renal/Endo    (+) obesity,   diabetes mellitus gestational well controlled,     Musculoskeletal     Abdominal    Substance History      OB/GYN    (+) Pregnant,         Other                        Anesthesia Plan    ASA 2 - emergent     epidural         Plan discussed with CRNA.        CODE STATUS:    Code Status (Patient has no pulse and is not breathing): CPR (Attempt to Resuscitate)  Medical Interventions (Patient has pulse or is breathing): Full

## 2023-04-14 NOTE — L&D DELIVERY NOTE
[facility]  Vaginal Delivery Note    Patient Name: Sofiya Almaguer  : 1990  MRN: [id number]  Date of Delivery: 2023     Diagnosis     Pre & Post-Delivery:  Intrauterine pregnancy at 39w1d  Labor status: Induced Onset of Labor     Diet controlled gestational diabetes mellitus (GDM) in third trimester    Chronic bilateral low back pain without sciatica    Obesity affecting pregnancy in third trimester    High-risk pregnancy in third trimester    Pelvic pain in pregnancy, antepartum, third trimester    Multigravida of advanced maternal age in third trimester    Grand multiparity    Single liveborn infant delivered vaginally             Problem List    Transfer to Postpartum     Review the Delivery Report for details.     Delivery     Delivery: Vaginal, Spontaneous     YOB: 2025    Time of Birth:  Gestational Age 1:50 PM   39w1d     Anesthesia: Epidural     Delivering clinician: Annia Amaral    Forceps?   No   Vacuum? No    Shoulder dystocia present: No        Delivery narrative:     Patient presented on  at 39w1d for EIOL; she was 2-3 cm on admission and pitocin was started.  She had spontaneous rupture of membranes at 1:10PM.  she progressed to complete cervical dilation at 1:26PM and began pushing at 1:44PM (patient initially refused to push due to pain).  Patient pushed to deliver a viable 3459g female from the MONICA position over an intact perineum via  under epidural anesthesia on 2023 at 1:50PM.  No noted nuchal cord.  Right anterior shoulder was delivered with ease, followed by left posterior shoulder.  Body was then delivered with gentle traction.  Mouth and nose bulb suctioned.  3 vessel cord clamped x2 and cut after 30 seconds of delayed clamping.  Baby was placed on mother's chest.  Cord blood was obtained and sent.  Placenta delivered spontaneously intact and Pitocin was started.  Cervix, vagina, and perineum were  examined and a superficial perineal laceration was noted.  3-0 Vicryl was used to repair the laceration in a routine fashion with good hemostasis.  EBL 100mL; QBL pending, please see nursing documentation.  Apgar scores 7/8.  Mother and infant stable.      Infant     Findings: female  infant     Infant observations: Weight: 3459 g (7 lb 10 oz)   Length: 19  in  Observations/Comments:        Apgars: 7  @ 1 minute /    8  @ 5 minutes   Infant Name: Margret     Placenta & Cord         Placenta delivered  Spontaneous  at   2023  1:58 PM     Cord: 3 vessels  present.   Nuchal Cord?  no   Cord blood obtained: Yes    Cord gases obtained:  No    Cord gas results: Venous:  No results found for: PHCVEN    Arterial:  No results found for: PHCART     Repair      Episiotomy: None    No    Lacerations: Yes  Laceration Information  Laceration Repaired?   Perineal: 1st  Yes    Periurethral:       Labial:       Sulcus:       Vaginal:       Cervical:         Suture used for repair: 3-0 Vicryl   Estimated Blood Loss: 100 mL     Quantitative Blood Loss: Quantitative Blood Loss (mL): 100 mL (23 1415)        Complications     GDMA1    Disposition     Mother to Mother Baby/Postpartum in stable condition currently.  Baby to remains with mom in stable condition currently.    Annia Amaral MD  23  14:11 CDT

## 2023-04-14 NOTE — INTERVAL H&P NOTE
H&P reviewed. The patient was examined and there are no changes to the H&P. No concerns. SVE 2-3 per RN, cesar. Will start pitocin. GBS negative. May have epidural if desired.    This document has been electronically signed by Annia Amaral MD on April 14, 2023 09:26 CDT.

## 2023-04-14 NOTE — PAYOR COMM NOTE
"KITTY KNIGHT RN [facility] PHONE [phone]  CM -[phone]  INPATIENT ADMISSION        Sofiya Crespo (33 y.o. Female)     Date of Birth   1990    Social Security Number       Address   [address] [place] [address]    Home Phone   [phone]    MRN   [id number]       Yazidism   None    Marital Status   Single                            Admission Date   4/14/23    Admission Type   Elective    Admitting Provider   Annia Amaral MD    Attending Provider   Annia Amaral MD    Department, Room/Bed   [facility] LABOR DELIVERY, L768/1       Discharge Date       Discharge Disposition       Discharge Destination                               Attending Provider: Annia Amaral MD    Allergies: No Known Allergies    Isolation: None   Infection: None   Code Status: CPR    Ht: 154.9 cm (61\")   Wt: 73 kg (161 lb)    Admission Cmt: None   Principal Problem: Diet controlled gestational diabetes mellitus (GDM) in third trimester [O24.410]                 Active Insurance as of 4/14/2023     Primary Coverage     Payor Plan Insurance Group Employer/Plan Group    ANTHEM BLUE CROSS ANTHEM BLUE CROSS BLUE SHIELD PPO [id number]     Payor Plan Address Payor Plan Phone Number Payor Plan Fax Number Effective Dates    [address] [phone]  1/1/2015 - None Entered    [facility] [id number]       Subscriber Name Subscriber Birth Date Member ID       SOFIYA CRESPO 1990 [id number]           Secondary Coverage     Payor Plan Insurance Group Employer/Plan Group    [employer] [place]LL[place]ARE [place]ICAID      Payor Plan Address Payor Plan Phone Number Payor Plan Fax Number Effective Dates    [address] [address] [phone]  8/1/2022 - None Entered    [facility] [id number]       Subscriber Name Subscriber Birth Date Member ID       SOFIYA CRESPO 1990 [id number]                 Emergency Contacts      (Rel.) Home " Phone Work Phone Mobile Phone    Aure De Leon (Grandparent) [phone] -- --               History & Physical      Annia Amaral MD at 23        H&P reviewed. The patient was examined and there are no changes to the H&P. No concerns. SVE 2-3 per RN, vertex. Will start pitocin. GBS negative. May have epidural if desired.    This document has been electronically signed by Annia Amaral MD on 2023 09:26 CDT.    Electronically signed by Annia Amaral MD at 23   Source Note          HISTORY & PHYSICAL - Obstetrics  [facility]    Name: Sofiya Almaguer  MRN: [id number]  Location: Room/bed info not found  Date: 2023  CSN: [id number]      CHIEF COMPLAINT:  IOL on  for GDMA1 at 39+1    HISTORY OF PRESENT ILLNESS  Sofiya Alamguer is a 33 y.o.  at 38w6d gestational age by 2nd TRI US suggesting Estimated Date of Delivery: 23.  The patient presents for routine PNC, scheduled IOL Friday. Nervous but ready.  Denies LOF.  Denies vaginal bleeding.  Having intermittent BH, but no regular painful contraction.  Good FM.    Patient denies any chest pain, palpitations, headaches, lightheadedness, shortness of breath, cough, nausea, vomiting, diarrhea, constipation, fever, or chills.    ROS  Review of Systems   Constitutional: Negative.    HENT: Negative.    Respiratory: Negative.    Cardiovascular: Negative.    Gastrointestinal: Negative.    Genitourinary: Negative.    Skin: Negative.    Psychiatric/Behavioral: Negative.        PRENATAL LAB RESULTS  Prenatal labs reviewed  External Prenatal Results     Pregnancy Outside Results - Transcribed From Office Records - See Scanned Records For Details     Test Value Date Time    ABO  A  10/28/22 1148    Rh  Positive  10/28/22 1148    Antibody Screen  Negative  10/28/22 1148    Varicella IgG       Rubella  4.12 index 10/28/22 1148    Hgb  11.3 g/dL 23 0832       13.0 g/dL  10/28/22 1148       12.8 g/dL 09/20/22 1451    Hct  33.0 % 01/25/23 0832       38.9 % 10/28/22 1148       37.5 % 09/20/22 1451    Glucose Fasting GTT  89 mg/dL 11/16/22 0850    Glucose Tolerance Test 1 hour  158 mg/dL 11/16/22 0952    Glucose Tolerance Test 3 hour  140 mg/dL 01/25/23 1321       114 mg/dL 11/16/22 1152    Gonorrhea (discrete)  Negative  10/28/22 1148    Chlamydia (discrete)  Negative  10/28/22 1148    RPR  Non-Reactive  10/28/22 1148    VDRL       Syphilis Antibody       HBsAg  Non-Reactive  10/28/22 1148    Herpes Simplex Virus PCR       Herpes Simplex VIrus Culture       HIV  Non-Reactive  10/28/22 1148    Hep C RNA Quant PCR       Hep C Antibody  Non-Reactive  10/28/22 1148    AFP  43.9 ng/mL 09/01/16 1029    Group B Strep  Negative  03/23/23 1042    GBS Susceptibility to Clindamycin       GBS Susceptibility to Erythromycin       Fetal Fibronectin       Genetic Testing, Maternal Blood             Drug Screening     Test Value Date Time    Urine Drug Screen       Amphetamine Screen  Negative  10/28/22 1148    Barbiturate Screen  Negative  10/28/22 1148    Benzodiazepine Screen  Negative  10/28/22 1148    Methadone Screen  Negative  10/28/22 1148    Phencyclidine Screen  Negative  10/28/22 1148    Opiates Screen  Negative  10/28/22 1148    THC Screen  Negative  10/28/22 1148    Cocaine Screen       Propoxyphene Screen  Negative  10/28/22 1148    Buprenorphine Screen  Negative  10/28/22 1148    Methamphetamine Screen       Oxycodone Screen  Negative  10/28/22 1148    Tricyclic Antidepressants Screen  Negative  10/28/22 1148          Legend    ^: Historical                        PRENATAL RISK FACTORS  10/22 Problems (from 10/24/22 to present)     Problem Noted Resolved    Multigravida of advanced maternal age in third trimester 3/23/2023 by Aliya Simmons, APRCHELA No    Pelvic pain in pregnancy, antepartum, third trimester 3/9/2023 by Aliya Simmons, APRCHELA No    Overview Signed 3/9/2023 11:46 AM  by Aliya Simmons APRN     Sees physical therapy         Diet controlled gestational diabetes mellitus (GDM) in third trimester 2023 by Aliya Simmons APRN No    Overview Addendum 3/23/2023 12:58 PM by Aliya Simmons APRN       IOL at 39 weeks if continues to be GDM diet controlled.          High-risk pregnancy in third trimester 2022 by Анна Andre DO No    Overview Addendum 2023  1:45 PM by Aliya Simmons APRN     Female fetus  BC?  Breastfeeding/ pump rx on 2023  Needs postpartup PAP         Obesity in pregnancy, antepartum 2022 by Aliya Simmons APRN No    Chronic bilateral low back pain without sciatica 2018 by vEa Jon DNP, LEEANN No    Overview Signed 3/9/2023 11:46 AM by Aliya Simmons APRN     Sees chiropractor and physical therayp               DATING SUMMARY  LMP (23) -- ELKIN 23  2 TRI US (22 at 16w6d) -- ELKIN 23    OB HISTORY  OB History    Para Term  AB Living   6 5 5 0 0 5   SAB IAB Ectopic Molar Multiple Live Births   0 0 0   0 5      # Outcome Date GA Lbr Cem/2nd Weight Sex Delivery Anes PTL Lv   6 Current            5 Term 17 39w1d / 01:08 3771 g (8 lb 5 oz) M Vag-Forceps EPI N THEODORE      Birth Comments: TcB 6 at 24h   4 Term 2011 39w0d   M Vag-Vacuum EPI N THEODORE   3 Term 2009   3147 g (6 lb 15 oz) F Vag-Spont  N THEODORE   2 Term 2006   2693 g (5 lb 15 oz) F Vag-Spont  N THEODORE   1 Term 2005 39w2d   M Vag-Vacuum  N THEODORE     GYN HISTORY  Denies h/o sexually transmitted infections/pelvic inflammatory disease  Denies h/o abnormal pap smears, last pap was NIL 16, repeat pap PP  Denies h/o gynecologic surgeries, including biopsies of the cervix    PAST MEDICAL HISTORY  Past Medical History:   Diagnosis Date   • Abnormal liver function    • Abnormal Pap smear of cervix    • Abnormal weight gain    • Acne    • Cervical intraepithelial neoplasia grade 2     h/o   • Contraception    • Ear infection      bilateral   • Headache    • Hyperlipidemia    • Vitamin D deficiency      PAST SURGICAL HISTORY  Past Surgical History:   Procedure Laterality Date   • ADENOIDECTOMY     • CERVICAL BIOPSY  W/ LOOP ELECTRODE EXCISION     • INCISION AND DRAINAGE ABSCESS  05/30/2012    simple I&D   • LEEP  03/22/2007    LEEP cone biopsy. moderate cervical dysplasia   • OTHER SURGICAL HISTORY  11/30/2011    remove impacted cerumen   • TONSILLECTOMY     • WISDOM TOOTH EXTRACTION      only had 1 removed     FAMILY HISTORY  Family History   Problem Relation Age of Onset   • Bipolar disorder Mother    • Depression Mother         depressive disorder   • Diabetes Mother    • Hyperlipidemia Mother    • Hypertension Mother    • Asthma Mother    • Breast cancer Other    • Colon cancer Other    • Diabetes Maternal Grandmother    • Hypertension Maternal Grandmother    • Fibromyalgia Maternal Grandmother    • Colon cancer Maternal Grandfather    • Diabetes Maternal Grandfather    • Alcohol abuse Father    • Cancer Sister    • Autism Brother    • Liver disease Paternal Grandmother    • No Known Problems Son    • No Known Problems Son    • No Known Problems Son    • No Known Problems Daughter    • No Known Problems Daughter      SOCIAL HISTORY  Social History     Socioeconomic History   • Marital status: Single   Tobacco Use   • Smoking status: Never   • Smokeless tobacco: Never   Vaping Use   • Vaping Use: Never used   Substance and Sexual Activity   • Alcohol use: No   • Drug use: Never   • Sexual activity: Defer     Partners: Male     ALLERGIES  No Known Allergies  HOME MEDICATIONS  Prior to Admission medications    Medication Sig Start Date End Date Taking? Authorizing Provider   Alcohol Swabs (Alcohol Prep) 70 % pads WIPE FINGER WITH ALCOHOL PREP PRIOR TO CHECKING BLOOD SUGAR 4 TIMES DAILY 1/26/23  Yes Ailya Simmons APRN   Blood Glucose Monitoring Suppl (OneTouch Verio Reflect) w/Device kit  1/26/23  Yes Provider, MD Dwight    Continuous Blood Gluc  (Dexcom G6 ) device See Admin Instructions. 23  Yes Dwight Dean MD   Continuous Blood Gluc Sensor (Dexcom G6 Sensor) See Admin Instructions. 23  Yes Dwigth Dean MD   Continuous Blood Gluc Transmit (Dexcom G6 Transmitter) misc See Admin Instructions. 23  Yes Dwight Dean MD   famotidine (Pepcid) 20 MG tablet Take 1 tablet by mouth Daily. 23 Yes Aliya Simmons APRN   glucose blood test strip CHECK BLOOD SUGARS 4 TIMES DAILY 23  Yes Aliya Simmons APRN   Prenatal Vit-Fe Fumarate-FA (PRENATAL PO) Take  by mouth.   Yes Dwight Dean MD   glucose blood test strip CHECK BLOOD SUGARS 4 TIMES DAILY 23 Yes Aliya Simmons APRN   acyclovir (ZOVIRAX) 5 % ointment  23   Dwight Dean MD   glucose monitor monitoring kit TEST BLOOD SUGARS 4 TIMES DAILY  Patient not taking: Reported on 2023   Aliya Simmons APRN   Lancets [facility] [place] BLOOD SUGARS 4 TIMES DAILY  Patient not taking: Reported on 2023   Aliya Simmons APRN     PHYSICAL EXAM  /72   Wt 74.4 kg (164 lb)   LMP 2022 (Approximate)   BMI 30.99 kg/m²   General: No acute distress. Well developed, well nourished. Pleasant.  Heart: Regular rate and rhythm.   Lungs: No increased work of breathing  Abdomen: Soft, nontender to palpation, enlarged by gravid uterus.    NST Review  On admission    SVE: 2.5/70/-2    IMPRESSION  Sofiya Almaguer is a 33 y.o.  at 38w6d scheduled for IOL at 39+1 on  for GDMA1..    PLAN  1.  IUP at 39w1d on  with plan for IOL for GDMA1  - Admit: Labor and Delivery  - Attending: Dr. Amaral  - Condition: Stable  - Vitals: per protocol  - Activity: ad chiquis  - Nursing: Continuous electronic fetal monitoring, as per protocol  - Diet: Clears  - IV fluids:  mL/hr  - Meds: prn zofran, tylenol, stadol  - Allergies: NKDA  - Labs: CBC, T&S, UDS  -  GBS: neg.  Antibiotics:  Not indicated  - Sofiya Almaguer and I have discussed pain goals for this hospitalization after reviewing her current clinical condition, medical history and prior pain experiences.  The goal is to keep her pain level manageable.  Patient does desire an epidural  - Plan for pitocin for favorable cervix in grand multip  - Anticipate     2.  GDMA1  - Plan for POC glucose q4h latent, q1h active labor        This document has been electronically signed by Анна Andre DO on 2023 09:45 CDT      Electronically signed by Анна Andre DO at 23 0953             Анна Andre DO at 23 0900          HISTORY & PHYSICAL - Obstetrics  [facility]    Name: Sofiya Almaguer  MRN: [id number]  Location: Room/bed info not found  Date: 2023  CSN: [id number]      CHIEF COMPLAINT:  IOL on  for GDMA1 at 39+1    HISTORY OF PRESENT ILLNESS  Sofiya Almaguer is a 33 y.o.  at 38w6d gestational age by 2nd TRI US suggesting Estimated Date of Delivery: 23.  The patient presents for routine PNC, scheduled IOL Friday. Nervous but ready.  Denies LOF.  Denies vaginal bleeding.  Having intermittent BH, but no regular painful contraction.  Good FM.    Patient denies any chest pain, palpitations, headaches, lightheadedness, shortness of breath, cough, nausea, vomiting, diarrhea, constipation, fever, or chills.    ROS  Review of Systems   Constitutional: Negative.    HENT: Negative.    Respiratory: Negative.    Cardiovascular: Negative.    Gastrointestinal: Negative.    Genitourinary: Negative.    Skin: Negative.    Psychiatric/Behavioral: Negative.        PRENATAL LAB RESULTS  Prenatal labs reviewed  External Prenatal Results     Pregnancy Outside Results - Transcribed From Office Records - See Scanned Records For Details     Test Value Date Time    ABO  A  10/28/22 1148    Rh  Positive  10/28/22 1148    Antibody Screen  Negative  10/28/22 114     Varicella IgG       Rubella  4.12 index 10/28/22 1148    Hgb  11.3 g/dL 01/25/23 0832       13.0 g/dL 10/28/22 1148       12.8 g/dL 09/20/22 1451    Hct  33.0 % 01/25/23 0832       38.9 % 10/28/22 1148       37.5 % 09/20/22 1451    Glucose Fasting GTT  89 mg/dL 11/16/22 0850    Glucose Tolerance Test 1 hour  158 mg/dL 11/16/22 0952    Glucose Tolerance Test 3 hour  140 mg/dL 01/25/23 1321       114 mg/dL 11/16/22 1152    Gonorrhea (discrete)  Negative  10/28/22 1148    Chlamydia (discrete)  Negative  10/28/22 1148    RPR  Non-Reactive  10/28/22 1148    VDRL       Syphilis Antibody       HBsAg  Non-Reactive  10/28/22 1148    Herpes Simplex Virus PCR       Herpes Simplex VIrus Culture       HIV  Non-Reactive  10/28/22 1148    Hep C RNA Quant PCR       Hep C Antibody  Non-Reactive  10/28/22 1148    AFP  43.9 ng/mL 09/01/16 1029    Group B Strep  Negative  03/23/23 1042    GBS Susceptibility to Clindamycin       GBS Susceptibility to Erythromycin       Fetal Fibronectin       Genetic Testing, Maternal Blood             Drug Screening     Test Value Date Time    Urine Drug Screen       Amphetamine Screen  Negative  10/28/22 1148    Barbiturate Screen  Negative  10/28/22 1148    Benzodiazepine Screen  Negative  10/28/22 1148    Methadone Screen  Negative  10/28/22 1148    Phencyclidine Screen  Negative  10/28/22 1148    Opiates Screen  Negative  10/28/22 1148    THC Screen  Negative  10/28/22 1148    Cocaine Screen       Propoxyphene Screen  Negative  10/28/22 1148    Buprenorphine Screen  Negative  10/28/22 1148    Methamphetamine Screen       Oxycodone Screen  Negative  10/28/22 1148    Tricyclic Antidepressants Screen  Negative  10/28/22 1148          Legend    ^: Historical                        PRENATAL RISK FACTORS  10/22 Problems (from 10/24/22 to present)     Problem Noted Resolved    Multigravida of advanced maternal age in third trimester 3/23/2023 by Aliya Simmons APRN No    Pelvic pain in pregnancy,  antepartum, third trimester 3/9/2023 by Aliya Simmons APRN No    Overview Signed 3/9/2023 11:46 AM by Aliya Simmons APRN     Sees physical therapy         Diet controlled gestational diabetes mellitus (GDM) in third trimester 2023 by Aliya Simmons APRN No    Overview Addendum 3/23/2023 12:58 PM by Aliya Simmons APRN       IOL at 39 weeks if continues to be GDM diet controlled.          High-risk pregnancy in third trimester 2022 by Анна Andre DO No    Overview Addendum 2023  1:45 PM by Aliya Simmons APRN     Female fetus  BC?  Breastfeeding/ pump rx on 2023  Needs postpartup PAP         Obesity in pregnancy, antepartum 2022 by Aliya Simmons APRN No    Chronic bilateral low back pain without sciatica 2018 by Eva Jon, BIANCA, LEEANN No    Overview Signed 3/9/2023 11:46 AM by Aliya Simmons APRN     Sees chiropractor and physical therayp               DATING SUMMARY  LMP (23) -- ELKIN 23  2 TRI US (22 at 16w6d) -- ELKIN 23    OB HISTORY  OB History    Para Term  AB Living   6 5 5 0 0 5   SAB IAB Ectopic Molar Multiple Live Births   0 0 0   0 5      # Outcome Date GA Lbr Cem/2nd Weight Sex Delivery Anes PTL Lv   6 Current            5 Term 17 39w1d / 01:08 3771 g (8 lb 5 oz) M Vag-Forceps EPI N THEODORE      Birth Comments: TcB 6 at 24h   4 Term 2011 39w0d   M Vag-Vacuum EPI N THEODORE   3 Term 2009   3147 g (6 lb 15 oz) F Vag-Spont  N THEODORE   2 Term 2006   2693 g (5 lb 15 oz) F Vag-Spont  N THEODORE   1 Term 2005 39w2d   M Vag-Vacuum  N THEODORE     GYN HISTORY  Denies h/o sexually transmitted infections/pelvic inflammatory disease  Denies h/o abnormal pap smears, last pap was NIL 16, repeat pap PP  Denies h/o gynecologic surgeries, including biopsies of the cervix    PAST MEDICAL HISTORY  Past Medical History:   Diagnosis Date   • Abnormal liver function    • Abnormal Pap smear of cervix    • Abnormal weight gain     • Acne    • Cervical intraepithelial neoplasia grade 2     h/o   • Contraception    • Ear infection     bilateral   • Headache    • Hyperlipidemia    • Vitamin D deficiency      PAST SURGICAL HISTORY  Past Surgical History:   Procedure Laterality Date   • ADENOIDECTOMY     • CERVICAL BIOPSY  W/ LOOP ELECTRODE EXCISION     • INCISION AND DRAINAGE ABSCESS  05/30/2012    simple I&D   • LEEP  03/22/2007    LEEP cone biopsy. moderate cervical dysplasia   • OTHER SURGICAL HISTORY  11/30/2011    remove impacted cerumen   • TONSILLECTOMY     • WISDOM TOOTH EXTRACTION      only had 1 removed     FAMILY HISTORY  Family History   Problem Relation Age of Onset   • Bipolar disorder Mother    • Depression Mother         depressive disorder   • Diabetes Mother    • Hyperlipidemia Mother    • Hypertension Mother    • Asthma Mother    • Breast cancer Other    • Colon cancer Other    • Diabetes Maternal Grandmother    • Hypertension Maternal Grandmother    • Fibromyalgia Maternal Grandmother    • Colon cancer Maternal Grandfather    • Diabetes Maternal Grandfather    • Alcohol abuse Father    • Cancer Sister    • Autism Brother    • Liver disease Paternal Grandmother    • No Known Problems Son    • No Known Problems Son    • No Known Problems Son    • No Known Problems Daughter    • No Known Problems Daughter      SOCIAL HISTORY  Social History     Socioeconomic History   • Marital status: Single   Tobacco Use   • Smoking status: Never   • Smokeless tobacco: Never   Vaping Use   • Vaping Use: Never used   Substance and Sexual Activity   • Alcohol use: No   • Drug use: Never   • Sexual activity: Defer     Partners: Male     ALLERGIES  No Known Allergies  HOME MEDICATIONS  Prior to Admission medications    Medication Sig Start Date End Date Taking? Authorizing Provider   Alcohol Swabs (Alcohol Prep) 70 % pads WIPE FINGER WITH ALCOHOL PREP PRIOR TO CHECKING BLOOD SUGAR 4 TIMES DAILY 1/26/23  Yes Aliya Simmons APRN Blood  Glucose Monitoring Suppl (OneTouch Verio Reflect) w/Device kit  23  Yes Dwight Dean MD   Continuous Blood Gluc  (Dexcom G6 ) device See Admin Instructions. 23  Yes Dwight Dean MD   Continuous Blood Gluc Sensor (Dexcom G6 Sensor) See Admin Instructions. 23  Yes Dwight Dean MD   Continuous Blood Gluc Transmit (Dexcom G6 Transmitter) misc See Admin Instructions. 23  Yes Dwight Dean MD   famotidine (Pepcid) 20 MG tablet Take 1 tablet by mouth Daily. 23 Yes Aliya Simmons APRN   glucose blood test strip CHECK BLOOD SUGARS 4 TIMES DAILY 23  Yes Aliya Simmons APRN   Prenatal Vit-Fe Fumarate-FA (PRENATAL PO) Take  by mouth.   Yes Dwight Dean MD   glucose blood test strip CHECK BLOOD SUGARS 4 TIMES DAILY 23 Yes Aliya Simmons APRN   acyclovir (ZOVIRAX) 5 % ointment  23   Dwight Dean MD   glucose monitor monitoring kit TEST BLOOD SUGARS 4 TIMES DAILY  Patient not taking: Reported on 2023   Aliya Simmons APRN   Lancets misc CHECK BLOOD SUGARS 4 TIMES DAILY  Patient not taking: Reported on 2023   Aliya Simmons APRN     PHYSICAL EXAM  /72   Wt 74.4 kg (164 lb)   LMP 2022 (Approximate)   BMI 30.99 kg/m²   General: No acute distress. Well developed, well nourished. Pleasant.  Heart: Regular rate and rhythm.   Lungs: No increased work of breathing  Abdomen: Soft, nontender to palpation, enlarged by gravid uterus.    NST Review  On admission    SVE: 2.5/70/-2    IMPRESSION  Sofiya Almaguer is a 33 y.o.  at 38w6d scheduled for IOL at 39+1 on  for GDMA1..    PLAN  1.  IUP at 39w1d on  with plan for IOL for GDMA1  - Admit: Labor and Delivery  - Attending: Dr. Amaral  - Condition: Stable  - Vitals: per protocol  - Activity: ad chiquis  - Nursing: Continuous electronic fetal monitoring, as per protocol  - Diet: Clears  - IV  fluids:  mL/hr  - Meds: prn zofran, tylenol, stadol  - Allergies: NKDA  - Labs: CBC, T&S, UDS  - GBS: neg.  Antibiotics:  Not indicated  - Sofiya Almaguer and I have discussed pain goals for this hospitalization after reviewing her current clinical condition, medical history and prior pain experiences.  The goal is to keep her pain level manageable.  Patient does desire an epidural  - Plan for pitocin for favorable cervix in grand multip  - Anticipate     2.  GDMA1  - Plan for POC glucose q4h latent, q1h active labor        This document has been electronically signed by Анна Andre DO on 2023 09:45 CDT      Electronically signed by Анна Andre DO at 23 0953          Operative/Procedure Notes (last 24 hours)      Annia Amaral MD at 23 1409          [facility]  Vaginal Delivery Note    Patient Name: Sofiya Almaguer  : 1990  MRN: [id number]  Date of Delivery: 2023     Diagnosis     Pre & Post-Delivery:  Intrauterine pregnancy at 39w1d  Labor status: Induced Onset of Labor     Diet controlled gestational diabetes mellitus (GDM) in third trimester    Chronic bilateral low back pain without sciatica    Obesity affecting pregnancy in third trimester    High-risk pregnancy in third trimester    Pelvic pain in pregnancy, antepartum, third trimester    Multigravida of advanced maternal age in third trimester    Grand multiparity    Single liveborn infant delivered vaginally             Problem List    Transfer to Postpartum     Review the Delivery Report for details.     Delivery     Delivery: Vaginal, Spontaneous     YOB: 2025    Time of Birth:  Gestational Age 1:50 PM   39w1d     Anesthesia: Epidural     Delivering clinician: Annia Amaral    Forceps?   No   Vacuum? No    Shoulder dystocia present: No        Delivery narrative:     Patient presented on  at 39w1d for EIOL; she was 2-3  cm on admission and pitocin was started.  She had spontaneous rupture of membranes at 1:10PM.  she progressed to complete cervical dilation at 1:26PM and began pushing at 1:44PM (patient initially refused to push due to pain).  Patient pushed to deliver a viable 3459g female from the MONICA position over an intact perineum via  under epidural anesthesia on 2023 at 1:50PM.  No noted nuchal cord.  Right anterior shoulder was delivered with ease, followed by left posterior shoulder.  Body was then delivered with gentle traction.  Mouth and nose bulb suctioned.  3 vessel cord clamped x2 and cut after 30 seconds of delayed clamping.  Baby was placed on mother's chest.  Cord blood was obtained and sent.  Placenta delivered spontaneously intact and Pitocin was started.  Cervix, vagina, and perineum were examined and a superficial perineal laceration was noted.  3-0 Vicryl was used to repair the laceration in a routine fashion with good hemostasis.  EBL 100mL; QBL pending, please see nursing documentation.  Apgar scores 7/8.  Mother and infant stable.      Infant     Findings: female  infant     Infant observations: Weight: 3459 g (7 lb 10 oz)   Length: 19  in  Observations/Comments:        Apgars: 7  @ 1 minute /    8  @ 5 minutes   Infant Name: Margret Almaguer     Placenta & Cord         Placenta delivered  Spontaneous  at   2023  1:58 PM     Cord: 3 vessels  present.   Nuchal Cord?  no   Cord blood obtained: Yes    Cord gases obtained:  No    Cord gas results: Venous:  No results found for: PHCVEN    Arterial:  No results found for: PHCART     Repair      Episiotomy: None    No    Lacerations: Yes  Laceration Information  Laceration Repaired?   Perineal: 1st  Yes    Periurethral:       Labial:       Sulcus:       Vaginal:       Cervical:         Suture used for repair: 3-0 Vicryl   Estimated Blood Loss: 100 mL     Quantitative Blood Loss: Quantitative Blood Loss (mL): 100 mL (23 1415)         Complications     GDMA1    Disposition     Mother to Mother Baby/Postpartum in stable condition currently.  Baby to remains with mom in stable condition currently.    Annia Amaral MD  04/14/23  14:11 CDT    Electronically signed by Annia Amaral MD at 04/14/23 1507       Physician Progress Notes (last 24 hours)  Notes from 04/13/23 1630 through 04/14/23 1630   No notes of this type exist for this encounter.

## 2023-04-14 NOTE — ANESTHESIA POSTPROCEDURE EVALUATION
Patient: Sofiya Almaguer    Procedure Summary     Date: 04/14/23 Room / Location:     Anesthesia Start: 1211 Anesthesia Stop: 1350    Procedure: LABOR ANALGESIA Diagnosis:     Scheduled Providers:  Provider: Mala Alvarez CRNA    Anesthesia Type: epidural ASA Status: 2 - Emergent          Anesthesia Type: epidural    Vitals  Vitals Value Taken Time   /51 04/14/23 1445   Temp     Pulse 70 04/14/23 1445   Resp     SpO2 99 % 04/14/23 1242           Post Anesthesia Care and Evaluation    Patient location during evaluation: bedside  Patient participation: complete - patient participated  Level of consciousness: awake  Pain score: 0  Pain management: adequate    Airway patency: patent  Anesthetic complications: No anesthetic complications  PONV Status: none  Cardiovascular status: acceptable  Respiratory status: acceptable  Hydration status: acceptable  Post Neuraxial Block status: Motor and sensory function returned to baseline and No signs or symptoms of PDPH

## 2023-04-14 NOTE — ANESTHESIA PROCEDURE NOTES
Labor Epidural    Pre-sedation assessment completed: 4/14/2023 12:11 PM    Patient reassessed immediately prior to procedure    Patient location during procedure: OB  Start Time: 4/14/2023 12:21 PM  Stop Time: 4/14/2023 12:40 PM  Performed By  CRNA/NIKI: Mala Alvarez CRNASRNA: Gerhard Key SRNA  Additional Notes  4-14-23, 3063 Pt complete and ready to push, but uncomfortable.  Bolused with 5 ml 2% lido with 50 mcg of fentanyl.   Prep:  Pt Position:sitting  Sterile Tech:cap, gloves, mask and sterile barrier  Prep:povidone-iodine 7.5% surgical scrub  Monitoring:blood pressure monitoring and continuous pulse oximetry  Epidural Block Procedure:  Approach:midline  Location:L3-L4  Needle Type:Tuohy  Needle Gauge:17 G  Loss of Resistance Medium: saline  Loss of Resistance: 6cm  Cath Depth at skin:12 cm  Paresthesia: none  Aspiration:negative  Test Dose:negative  Number of Attempts: 1  Post Assessment:  Dressing:occlusive dressing applied and secured with tape  Pt Tolerance:patient tolerated the procedure well with no apparent complications  Complications:no

## 2023-04-15 VITALS
BODY MASS INDEX: 30.42 KG/M2 | SYSTOLIC BLOOD PRESSURE: 116 MMHG | TEMPERATURE: 97.7 F | DIASTOLIC BLOOD PRESSURE: 69 MMHG | OXYGEN SATURATION: 98 % | RESPIRATION RATE: 18 BRPM | WEIGHT: 161 LBS | HEART RATE: 77 BPM

## 2023-04-15 PROCEDURE — 0503F POSTPARTUM CARE VISIT: CPT | Performed by: OBSTETRICS & GYNECOLOGY

## 2023-04-15 RX ORDER — HYDROCORTISONE 25 MG/G
1 CREAM TOPICAL 2 TIMES DAILY PRN
Qty: 28 G | Refills: 1 | Status: SHIPPED | OUTPATIENT
Start: 2023-04-15

## 2023-04-15 RX ADMIN — DOCUSATE SODIUM 100 MG: 100 CAPSULE, LIQUID FILLED ORAL at 08:20

## 2023-04-15 RX ADMIN — Medication 1 TABLET: at 08:20

## 2023-04-15 RX ADMIN — FERROUS SULFATE TAB EC 324 MG (65 MG FE EQUIVALENT) 324 MG: 324 (65 FE) TABLET DELAYED RESPONSE at 08:21

## 2023-04-15 RX ADMIN — IBUPROFEN 600 MG: 600 TABLET, FILM COATED ORAL at 08:20

## 2023-04-15 RX ADMIN — ACETAMINOPHEN 650 MG: 325 TABLET ORAL at 06:04

## 2023-04-15 NOTE — DISCHARGE INSTR - APPOINTMENTS
Follow Up appointments with Dr. Amaral in 2 weeks and in 6 weeks.  Office will call you with date/time of appointments.  [phone]

## 2023-04-15 NOTE — DISCHARGE SUMMARY
[facility]  Discharge Summary  Patient Name: Sofiya Almaguer  : 1990  MRN: [id number]  [facility]: [id number]    Discharge Summary    Date of Admission: 2023   Date of Discharge: 4/15/2023    Principle Discharge Dx: Active Hospital Problems    Diagnosis  POA   • **Diet controlled gestational diabetes mellitus (GDM) in third trimester [O24.410]  Yes       IOL at 39 weeks if continues to be GDM diet controlled.      • Grand multiparity [Z64.1]  Not Applicable   • Single liveborn infant delivered vaginally [Z38.00]  No   • Multigravida of advanced maternal age in third trimester [O09.523]  Yes   • Pelvic pain in pregnancy, antepartum, third trimester [O26.893, R10.2]  Yes     Sees physical therapy     • High-risk pregnancy in third trimester [O09.93]  Not Applicable     Female fetus  BC?  Breastfeeding/ pump rx on 2023  Needs postpartup PAP     • Obesity affecting pregnancy in third trimester [O99.213]  Yes   • Chronic bilateral low back pain without sciatica [M54.50, G89.29]  Yes     Sees chiropractor and physical therayp        Procedures Performed:    Brief History: Patient is a 33 y.o. now  who presented to labor and delivery at 39w1d for IOL secondary to GDMA1.   Hospital Course: Patient presented at 39w1d for IOL secondary to GDMA1.  She had a .  Her postpartum course was unremarkable.  On PPD #1 she expressed the desire for discharge.  She had passed gas and was urinating normally.  She was eating a regular diet without difficulty.  She was ambulating well.  Discharge instructions were given.  All questions were answered   Condition:  Discharge Activity: Stable  Activity Instructions     Bathing Restrictions      Type of Restriction: Bathing    Bathing Restrictions: Other    Explain Bathing Restrictions: No soaking in bathtub for 4 weeks. Showers are fine.    Driving Restrictions      Type of Restriction: Driving    Driving Restrictions: No Driving (Time  Limited)    Length: Other    Indicate Length of Restriction: No driving for 1 week or if using narcotic pain medications. Riding is car is fine.    Lifting Restrictions      Type of Restriction: Lifting    Lifting Restrictions: Other    Explain Lifing Restrictions: No lifting more than infant and baby carrier together for 6 weeks.    Pelvic Rest      Nothing in the vagina for 6 weeks to include tampons, intercourse, or douching.    Sexual Activity Restrictions      Type of Restriction: Sex    Explain Sexual Activity Restrictions: No sexual intercourse for at least 6 weeks         Discharge Diet: Diet Instructions     Diet: Regular/House Diet      Discharge Diet: Regular/House Diet    Texture: Regular Texture (IDDSI 7)    Fluid Consistency: Thin (IDDSI 0)         Discharge Medications:    Your medication list      START taking these medications      Instructions Last Dose Given Next Dose Due   Hydrocortisone (Perianal) 2.5 % rectal cream  Commonly known as: ANUSOL-HC      Insert 1 application into the rectum 2 (Two) Times a Day As Needed for Hemorrhoids.          CONTINUE taking these medications      Instructions Last Dose Given Next Dose Due   famotidine 20 MG tablet  Commonly known as: Pepcid      Take 1 tablet by mouth Daily.       PRENATAL PO      Take  by mouth.          STOP taking these medications    acyclovir 5 % ointment  Commonly known as: ZOVIRAX              Where to Get Your Medications      These medications were sent to [facility] [address] [place] [address] - [address] [place] [address] DRIVE - [phone]  - [phone] [address] [address]    Phone: [phone] ·   Hydrocortisone (Perianal) 2.5 % rectal cream     *Declined Tylenol and Motrin stating that she had that at home     Discharge Disposition: Home   Follow-up: Future Appointments   Date Time Provider Department [facility]   2/28/2024 10:30 AM Eva Jon, DNP, APRN [facility]     2 week PP visit (telephone)  6 week PP visit      <30 minutes was spent with the patient on the day of discharge.    This document has been electronically signed by Annia Amaral MD on April 15, 2023 11:10 CDT.

## 2023-04-17 NOTE — PAYOR COMM NOTE
"Yoanna Negronmore  [facility]  Case Management Extender  [phone] phone  [phone] fax      Auth# [id number]    Shilo Crespo (33 y.o. Female)     Date of Birth   1990    Social Security Number       Address   [address] [place], [place] [address]    Home Phone   [phone]    MRN   [id number]       Shinto   None    Marital Status   Single                            Admission Date   4/14/23    Admission Type   Elective    Admitting Provider   Annia Amaral MD    Attending Provider       Department, Room/Bed   [facility] MOTHER BABY, M759/1       Discharge Date   4/15/2023    Discharge Disposition   Home or Self Care    Discharge Destination                               Attending Provider: (none)   Allergies: No Known Allergies    Isolation: None   Infection: None   Code Status: Prior    Ht: 154.9 cm (61\")   Wt: 73 kg (161 lb)    Admission Cmt: None   Principal Problem: Diet controlled gestational diabetes mellitus (GDM) in third trimester [O24.410]                 Active Insurance as of 4/14/2023     Primary Coverage     Payor Plan Insurance Group Employer/Plan Group    ANTHEM BLUE CROSS ANTHEM BLUE CROSS BLUE SHIELD PPO [id number]     Payor Plan Address Payor Plan Phone Number Payor Plan Fax Number Effective Dates    [address] [phone]  1/1/2015 - None Entered    [facility] [id number]       Subscriber Name Subscriber Birth Date Member ID       SHILO CRESPO 1990 [id number]           Secondary Coverage     Payor Plan Insurance Group Employer/Plan Group    [employer] [place] [employer] MEDICAID      Payor Plan Address Payor Plan Phone Number Payor Plan Fax Number Effective Dates    [address] [address] [phone]  8/1/2022 - None Entered    [facility] [id number]       Subscriber Name Subscriber Birth Date Member ID       SHILO CRESPO 1990 [id number]                 Emergency Contacts      " (Rel.) Home Phone Work Phone Mobile Phone    Aure De Leon (Grandparent) [phone] -- --               Discharge Summary      Annia Amaral MD at 04/15/23 1110          [facility]  Discharge Summary  Patient Name: Sofiya Almaguer  : 1990  MRN: [id number]  CSN: [id number]    Discharge Summary    Date of Admission: 2023   Date of Discharge: 4/15/2023    Principle Discharge Dx: Active Hospital Problems    Diagnosis  POA   • **Diet controlled gestational diabetes mellitus (GDM) in third trimester [O24.410]  Yes       IOL at 39 weeks if continues to be GDM diet controlled.      • Grand multiparity [Z64.1]  Not Applicable   • Single liveborn infant delivered vaginally [Z38.00]  No   • Multigravida of advanced maternal age in third trimester [O09.523]  Yes   • Pelvic pain in pregnancy, antepartum, third trimester [O26.893, R10.2]  Yes     Sees physical therapy     • High-risk pregnancy in third trimester [O09.93]  Not Applicable     Female fetus  BC?  Breastfeeding/ pump rx on 2023  Needs postpartup PAP     • Obesity affecting pregnancy in third trimester [O99.213]  Yes   • Chronic bilateral low back pain without sciatica [M54.50, G89.29]  Yes     Sees chiropractor and physical therayp        Procedures Performed:    Brief History: Patient is a 33 y.o. now  who presented to labor and delivery at 39w1d for IOL secondary to GDMA1.   Hospital Course: Patient presented at 39w1d for IOL secondary to GDMA1.  She had a .  Her postpartum course was unremarkable.  On PPD #1 she expressed the desire for discharge.  She had passed gas and was urinating normally.  She was eating a regular diet without difficulty.  She was ambulating well.  Discharge instructions were given.  All questions were answered   Condition:  Discharge Activity: Stable  Activity Instructions     Bathing Restrictions      Type of Restriction: Bathing    Bathing Restrictions:  Other    Explain Bathing Restrictions: No soaking in bathtub for 4 weeks. Showers are fine.    Driving Restrictions      Type of Restriction: Driving    Driving Restrictions: No Driving (Time Limited)    Length: Other    Indicate Length of Restriction: No driving for 1 week or if using narcotic pain medications. Riding is car is fine.    Lifting Restrictions      Type of Restriction: Lifting    Lifting Restrictions: Other    Explain Lifing Restrictions: No lifting more than infant and baby carrier together for 6 weeks.    Pelvic Rest      Nothing in the vagina for 6 weeks to include tampons, intercourse, or douching.    Sexual Activity Restrictions      Type of Restriction: Sex    Explain Sexual Activity Restrictions: No sexual intercourse for at least 6 weeks         Discharge Diet: Diet Instructions     Diet: Regular/House Diet      Discharge Diet: Regular/House Diet    Texture: Regular Texture (IDDSI 7)    Fluid Consistency: Thin (IDDSI 0)         Discharge Medications:    Your medication list      START taking these medications      Instructions Last Dose Given Next Dose Due   Hydrocortisone (Perianal) 2.5 % rectal cream  Commonly known as: ANUSOL-HC      Insert 1 application into the rectum 2 (Two) Times a Day As Needed for Hemorrhoids.          CONTINUE taking these medications      Instructions Last Dose Given Next Dose Due   famotidine 20 MG tablet  Commonly known as: Pepcid      Take 1 tablet by mouth Daily.       PRENATAL PO      Take  by mouth.          STOP taking these medications    acyclovir 5 % ointment  Commonly known as: ZOVIRAX              Where to Get Your Medications      These medications were sent to [facility] [address] [place], [place] - [address] - [phone]  - [phone]   [address], [facility] [address]    Phone: [phone] ·   Hydrocortisone (Perianal) 2.5 % rectal cream     *Declined Tylenol and Motrin stating that she had that at home     Discharge  Disposition: Home   Follow-up: Future Appointments   Date Time Provider Department Center   2/28/2024 10:30 AM Eva Jon, BIANCA, APRN [facility]     2 week PP visit (telephone)  6 week PP visit     <30 minutes was spent with the patient on the day of discharge.    This document has been electronically signed by Eileen Mejia MD on April 15, 2023 11:10 CDT.    Electronically signed by Eileen Meija MD at 04/15/23 1111       Discharge Order (From admission, onward)     Start     Ordered    04/15/23 1110  Discharge patient  Once        Expected Discharge Date: 04/15/23    Discharge Disposition: Home or Self Care    Physician of Record for Attribution - Please select from Treatment Team: EILEEN MEJIA [[id number]]    Review needed by CMO to determine Physician of Record: No       Question Answer Comment   Physician of Record for Attribution - Please select from Treatment Team EILEEN MEJIA    Review needed by CMO to determine Physician of Record No        04/15/23 1110

## 2023-04-25 ENCOUNTER — OFFICE VISIT (OUTPATIENT)
Dept: OBSTETRICS AND GYNECOLOGY | Facility: CLINIC | Age: 33
End: 2023-04-25
Payer: COMMERCIAL

## 2023-04-25 NOTE — PROGRESS NOTES
[facility]  Obstetrics  Date of Service: 2023    CC: Postpartum visit      Sofiya Almaguer is a 33 y.o.  s/p Vaginal, Spontaneous on 2023 at 39w1d secondary to IOL for GDMA1 who presents today for postpartum check.  The patient states she is doing well.  Patient denies postpartum depression.  Menstrual cycles have not resumed.  Breastfeeding.  She has not resumed sexual intercourse.  Denies bowel or bladder issues.  Not sure if she has a UTI or not.    PHYSICAL EXAM:    LMP 2022 (Approximate)   Breastfeeding Yes   Postpartum Depression Screening Questionnaire: 3    IMPRESSION/PLAN: Sofiya Almaguer is a 33 y.o.  s/p Vaginal, Spontaneous on .  Doing well.  - Recovered nicely from her delivery  - Contraception: considering BTL, counseled about surgery; she will consider and let us know   - Continue restrictions  - RTC 4 weeks for final PP visit  - Offered to place UA order; patient will wait for a few days to see if symptoms resolve and if not, she will let us know    This document has been electronically signed by Annia Amaral MD on 2023 13:29 CDT.    This visit has been rescheduled as a phone visit to comply with patient safety concerns in accordance with CDC recommendations.  Total time of discussion was 8 minutes.  The use of a telephone visit has been reviewed with the patient and verbal informed consent has been obtained.

## 2023-05-30 ENCOUNTER — POSTPARTUM VISIT (OUTPATIENT)
Dept: OBSTETRICS AND GYNECOLOGY | Facility: CLINIC | Age: 33
End: 2023-05-30

## 2023-05-30 VITALS — DIASTOLIC BLOOD PRESSURE: 68 MMHG | SYSTOLIC BLOOD PRESSURE: 114 MMHG | WEIGHT: 146 LBS | BODY MASS INDEX: 27.59 KG/M2

## 2023-05-30 DIAGNOSIS — Z12.4 ENCOUNTER FOR PAPANICOLAOU SMEAR FOR CERVICAL CANCER SCREENING: ICD-10-CM

## 2023-05-30 DIAGNOSIS — Z30.09 UNWANTED FERTILITY: ICD-10-CM

## 2023-05-30 PROBLEM — O99.213 OBESITY AFFECTING PREGNANCY IN THIRD TRIMESTER: Status: RESOLVED | Noted: 2022-11-09 | Resolved: 2023-05-30

## 2023-05-30 PROBLEM — Z64.1 GRAND MULTIPARITY: Status: RESOLVED | Noted: 2023-04-14 | Resolved: 2023-05-30

## 2023-05-30 PROBLEM — R10.2 PELVIC PAIN IN PREGNANCY, ANTEPARTUM, THIRD TRIMESTER: Status: RESOLVED | Noted: 2023-03-09 | Resolved: 2023-05-30

## 2023-05-30 PROBLEM — O26.893 PELVIC PAIN IN PREGNANCY, ANTEPARTUM, THIRD TRIMESTER: Status: RESOLVED | Noted: 2023-03-09 | Resolved: 2023-05-30

## 2023-05-30 PROBLEM — M54.50 CHRONIC BILATERAL LOW BACK PAIN WITHOUT SCIATICA: Status: RESOLVED | Noted: 2018-12-28 | Resolved: 2023-05-30

## 2023-05-30 PROBLEM — O24.410 DIET CONTROLLED GESTATIONAL DIABETES MELLITUS (GDM) IN THIRD TRIMESTER: Status: RESOLVED | Noted: 2023-01-25 | Resolved: 2023-05-30

## 2023-05-30 PROBLEM — O09.523 MULTIGRAVIDA OF ADVANCED MATERNAL AGE IN THIRD TRIMESTER: Status: RESOLVED | Noted: 2023-03-23 | Resolved: 2023-05-30

## 2023-05-30 PROBLEM — O09.93 HIGH-RISK PREGNANCY IN THIRD TRIMESTER: Status: RESOLVED | Noted: 2022-12-19 | Resolved: 2023-05-30

## 2023-05-30 PROBLEM — G89.29 CHRONIC BILATERAL LOW BACK PAIN WITHOUT SCIATICA: Status: RESOLVED | Noted: 2018-12-28 | Resolved: 2023-05-30

## 2023-05-30 RX ORDER — ACETAMINOPHEN 500 MG
1000 TABLET ORAL ONCE
OUTPATIENT
Start: 2023-05-30 | End: 2023-05-30

## 2023-05-30 RX ORDER — SODIUM CHLORIDE 0.9 % (FLUSH) 0.9 %
3 SYRINGE (ML) INJECTION EVERY 12 HOURS SCHEDULED
OUTPATIENT
Start: 2023-05-30

## 2023-05-30 RX ORDER — SODIUM CHLORIDE 0.9 % (FLUSH) 0.9 %
10 SYRINGE (ML) INJECTION AS NEEDED
OUTPATIENT
Start: 2023-05-30

## 2023-05-30 RX ORDER — SODIUM CHLORIDE 9 MG/ML
40 INJECTION, SOLUTION INTRAVENOUS AS NEEDED
OUTPATIENT
Start: 2023-05-30

## 2023-05-30 RX ORDER — SODIUM CHLORIDE, SODIUM LACTATE, POTASSIUM CHLORIDE, CALCIUM CHLORIDE 600; 310; 30; 20 MG/100ML; MG/100ML; MG/100ML; MG/100ML
125 INJECTION, SOLUTION INTRAVENOUS CONTINUOUS
OUTPATIENT
Start: 2023-05-30

## 2023-05-30 NOTE — PROGRESS NOTES
[facility]  Obstetrics  Date of Service: 2023     CC: Postpartum visit      Sofiya Almaguer is a 33 y.o.  s/p Vaginal, Spontaneous on 2023 at 39w1d secondary to IOL for GDMA1 who presents today for postpartum check.  The patient states she is doing well.  Patient denies postpartum depression.  Menstrual cycles have started.  Breastfeed/pumping.  She has not resumed sexual intercourse.  Denies bowel or bladder issues.    PHYSICAL EXAM:    /68 (BP Location: Left arm, Patient Position: Sitting, Cuff Size: Adult)   Wt 66.2 kg (146 lb)   LMP 2022 (Approximate)   Breastfeeding Yes   BMI 27.59 kg/m²   Postpartum Depression Screening Questionnaire: 0  Gen: NAD, AAO x3  Abd: Soft, NTND  External Genitalia/Vulva: Anatomy is normal, no significant redness of labia, no discharge on vulvar tissues, Casco's and Bartholin's glands are normal, no ulcers, no condylomatous lesions.  Urethral meatus: Normal, no lesions, no prolapse.  Urethra: Normal, no masses, no tenderness with palpation.  Bladder: Normal, no fullness, no masses, no tenderness with palpation.  Vagina: Vaginal tissues are not inflamed, normal color and texture, no significant discharge present.  Pelvic support adequate.  Cervix: Normal, no lesions, no purulent discharge, no cervical motion tenderness.  Pap smear obtained.  Uterus: Normal size, shape, and consistency.  Good mobility noted.  Minimal descent noted with good support.  Adnexa: Normal size and shape bilaterally, no palpable mass bilaterally and non-tender bilaterally.  Rectal: Normal, no masses or polyps, confirms bimanual exam, perianal normal, no lesions; LUISA deferred.    IMPRESSION/PLAN: Sofiya Almaguer is a 33 y.o.  s/p Vaginal, Spontaneous on .  Doing well.  - Recovered nicely from her delivery  - Contraception: Counseled extensively about non-hormonal options.  She is interested in a tubal but worried about weight  gain; discussed that not a side effect.  After counseling, she would like to proceed with tubal ligation.  Dicussed recommendation for bilateral salpingectomy; reviewed that this is not a reversible procedure.  Reviewed that it is a risk-reducing procedure for ovarian and fallopian tube cancers in the future.  Discussed that if she did decide that she wanted another child would be through IVF.  Voices understanding.  BTL MA consents signed today.  She will call us to schedule.  - Restrictions lifted  - Pap smear today    This document has been electronically signed by Annia Amaral MD on May 30, 2023 14:50 CDT.

## 2023-06-01 LAB — REF LAB TEST METHOD: NORMAL

## 2023-08-23 ENCOUNTER — PATIENT MESSAGE (OUTPATIENT)
Dept: FAMILY MEDICINE CLINIC | Facility: CLINIC | Age: 33
End: 2023-08-23
Payer: COMMERCIAL

## 2023-08-23 RX ORDER — FLUCONAZOLE 200 MG/1
200 TABLET ORAL DAILY
Qty: 14 TABLET | Refills: 0 | Status: SHIPPED | OUTPATIENT
Start: 2023-08-23 | End: 2023-08-23 | Stop reason: RX

## 2023-08-23 RX ORDER — FLUCONAZOLE 100 MG/1
200 TABLET ORAL DAILY
Qty: 28 TABLET | Refills: 0 | Status: SHIPPED | OUTPATIENT
Start: 2023-08-23 | End: 2023-09-06

## 2023-08-23 NOTE — TELEPHONE ENCOUNTER
From: Sofiya Almaguer  To: Eva Jon  Sent: 8/23/2023 8:32 AM CDT  Subject: med request    Hi. hope all is well. I have developed a yeast infection on both breast over this past week. i have tried coconut oil, apple cider vinegar and topical creams otc. can you prescribe me something? im desperate. The itch makes me want to claw my skin off. tmi, i know. thanks